# Patient Record
Sex: FEMALE | Race: WHITE | NOT HISPANIC OR LATINO | Employment: OTHER | RURAL
[De-identification: names, ages, dates, MRNs, and addresses within clinical notes are randomized per-mention and may not be internally consistent; named-entity substitution may affect disease eponyms.]

---

## 2020-07-15 ENCOUNTER — HISTORICAL (OUTPATIENT)
Dept: ADMINISTRATIVE | Facility: HOSPITAL | Age: 60
End: 2020-07-15

## 2020-12-14 ENCOUNTER — HISTORICAL (OUTPATIENT)
Dept: ADMINISTRATIVE | Facility: HOSPITAL | Age: 60
End: 2020-12-14

## 2021-03-16 ENCOUNTER — TELEPHONE (OUTPATIENT)
Dept: PRIMARY CARE CLINIC | Facility: CLINIC | Age: 61
End: 2021-03-16

## 2021-03-16 RX ORDER — HYDROCHLOROTHIAZIDE 12.5 MG/1
25 TABLET ORAL DAILY
COMMUNITY
Start: 2020-12-12 | End: 2021-06-24 | Stop reason: SDUPTHER

## 2021-03-16 RX ORDER — FENOFIBRATE 145 MG/1
145 TABLET, FILM COATED ORAL DAILY
COMMUNITY
Start: 2020-12-12 | End: 2021-06-24 | Stop reason: SDUPTHER

## 2021-03-16 RX ORDER — TOBRAMYCIN AND DEXAMETHASONE 3; 1 MG/ML; MG/ML
SUSPENSION/ DROPS OPHTHALMIC
COMMUNITY
Start: 2021-01-04 | End: 2022-02-22

## 2021-03-16 RX ORDER — OLMESARTAN MEDOXOMIL 40 MG/1
40 TABLET ORAL DAILY
COMMUNITY
Start: 2020-12-12 | End: 2021-03-16 | Stop reason: SDUPTHER

## 2021-03-16 RX ORDER — CARVEDILOL 25 MG/1
25 TABLET ORAL 2 TIMES DAILY
COMMUNITY
Start: 2021-02-08 | End: 2022-02-22 | Stop reason: SDUPTHER

## 2021-03-16 RX ORDER — DILTIAZEM HYDROCHLORIDE 240 MG/1
240 CAPSULE, COATED, EXTENDED RELEASE ORAL DAILY
COMMUNITY
Start: 2021-02-08 | End: 2021-03-16 | Stop reason: SDUPTHER

## 2021-03-16 RX ORDER — WARFARIN SODIUM 5 MG/1
5 TABLET ORAL DAILY
COMMUNITY
End: 2021-03-16 | Stop reason: SDUPTHER

## 2021-03-16 RX ORDER — OLMESARTAN MEDOXOMIL 40 MG/1
40 TABLET ORAL DAILY
Qty: 90 TABLET | Refills: 3 | Status: SHIPPED | OUTPATIENT
Start: 2021-03-16 | End: 2022-02-22 | Stop reason: SDUPTHER

## 2021-03-16 RX ORDER — LATANOPROST 50 UG/ML
1 SOLUTION/ DROPS OPHTHALMIC NIGHTLY
COMMUNITY
Start: 2021-02-01 | End: 2022-02-22

## 2021-03-16 RX ORDER — WARFARIN SODIUM 5 MG/1
5 TABLET ORAL DAILY
Qty: 90 TABLET | Refills: 3 | Status: SHIPPED | OUTPATIENT
Start: 2021-03-16 | End: 2022-04-26

## 2021-03-16 RX ORDER — DILTIAZEM HYDROCHLORIDE 240 MG/1
240 CAPSULE, COATED, EXTENDED RELEASE ORAL DAILY
Qty: 90 CAPSULE | Refills: 3 | Status: SHIPPED | OUTPATIENT
Start: 2021-03-16 | End: 2022-02-22 | Stop reason: SDUPTHER

## 2021-06-24 ENCOUNTER — TELEPHONE (OUTPATIENT)
Dept: PRIMARY CARE CLINIC | Facility: CLINIC | Age: 61
End: 2021-06-24

## 2021-06-24 RX ORDER — HYDROCHLOROTHIAZIDE 12.5 MG/1
25 TABLET ORAL DAILY
Qty: 90 TABLET | Refills: 1 | Status: SHIPPED | OUTPATIENT
Start: 2021-06-24

## 2021-06-24 RX ORDER — FENOFIBRATE 145 MG/1
145 TABLET, FILM COATED ORAL DAILY
Qty: 90 TABLET | Refills: 1 | Status: SHIPPED | OUTPATIENT
Start: 2021-06-24

## 2021-06-24 RX ORDER — ROSUVASTATIN CALCIUM 20 MG/1
20 TABLET, COATED ORAL DAILY
Qty: 90 TABLET | Refills: 1 | Status: SHIPPED | OUTPATIENT
Start: 2021-06-24 | End: 2022-02-22 | Stop reason: SDUPTHER

## 2021-06-24 RX ORDER — ROSUVASTATIN CALCIUM 20 MG/1
20 TABLET, COATED ORAL DAILY
COMMUNITY
Start: 2021-04-05 | End: 2021-06-24 | Stop reason: SDUPTHER

## 2021-08-09 ENCOUNTER — HOSPITAL ENCOUNTER (OUTPATIENT)
Dept: RADIOLOGY | Facility: HOSPITAL | Age: 61
Discharge: HOME | End: 2021-08-09
Payer: COMMERCIAL

## 2021-08-09 VITALS — BODY MASS INDEX: 37.89 KG/M2 | HEIGHT: 68 IN | WEIGHT: 250 LBS

## 2021-08-09 DIAGNOSIS — Z12.31 VISIT FOR SCREENING MAMMOGRAM: ICD-10-CM

## 2021-08-09 PROCEDURE — 76641 PR US BREAST COMPLETE UNILAT: ICD-10-PCS | Mod: 26,RT,, | Performed by: RADIOLOGY

## 2021-08-09 PROCEDURE — 77067 SCR MAMMO BI INCL CAD: CPT | Mod: TC

## 2021-08-09 PROCEDURE — 76641 ULTRASOUND BREAST COMPLETE: CPT | Mod: TC,50

## 2021-08-09 PROCEDURE — 77067 MAMMO DIGITAL SCREENING BILAT: ICD-10-PCS | Mod: 26,,, | Performed by: RADIOLOGY

## 2021-08-09 PROCEDURE — 76641 ULTRASOUND BREAST COMPLETE: CPT | Mod: 26,LT,, | Performed by: RADIOLOGY

## 2021-08-09 PROCEDURE — 77067 SCR MAMMO BI INCL CAD: CPT | Mod: 26,,, | Performed by: RADIOLOGY

## 2021-08-09 PROCEDURE — 76641 ULTRASOUND BREAST COMPLETE: CPT | Mod: 26,RT,, | Performed by: RADIOLOGY

## 2021-08-30 ENCOUNTER — TELEPHONE (OUTPATIENT)
Dept: PRIMARY CARE CLINIC | Facility: CLINIC | Age: 61
End: 2021-08-30

## 2021-09-09 ENCOUNTER — TELEPHONE (OUTPATIENT)
Dept: INTERNAL MEDICINE | Facility: CLINIC | Age: 61
End: 2021-09-09

## 2022-01-11 ENCOUNTER — HOSPITAL ENCOUNTER (OUTPATIENT)
Dept: RADIOLOGY | Facility: HOSPITAL | Age: 62
Discharge: HOME | End: 2022-01-11
Attending: RADIOLOGY
Payer: COMMERCIAL

## 2022-01-11 VITALS — BODY MASS INDEX: 39.4 KG/M2 | WEIGHT: 260 LBS | HEIGHT: 68 IN

## 2022-01-11 DIAGNOSIS — Z09 FOLLOW-UP EXAM, 3-6 MONTHS SINCE PREVIOUS EXAM: ICD-10-CM

## 2022-01-11 PROCEDURE — 77066 DX MAMMO INCL CAD BI: CPT | Mod: TC

## 2022-01-11 PROCEDURE — 77066 MAMMO DIGITAL DIAGNOSTIC BILAT: ICD-10-PCS | Mod: 26,,, | Performed by: RADIOLOGY

## 2022-01-11 PROCEDURE — 77066 DX MAMMO INCL CAD BI: CPT | Mod: 26,,, | Performed by: RADIOLOGY

## 2022-02-22 ENCOUNTER — OFFICE VISIT (OUTPATIENT)
Dept: PRIMARY CARE CLINIC | Facility: CLINIC | Age: 62
End: 2022-02-22
Payer: COMMERCIAL

## 2022-02-22 VITALS
DIASTOLIC BLOOD PRESSURE: 84 MMHG | BODY MASS INDEX: 40.92 KG/M2 | OXYGEN SATURATION: 97 % | RESPIRATION RATE: 17 BRPM | WEIGHT: 270 LBS | TEMPERATURE: 98 F | HEART RATE: 81 BPM | SYSTOLIC BLOOD PRESSURE: 124 MMHG | HEIGHT: 68 IN

## 2022-02-22 DIAGNOSIS — I48.0 PAROXYSMAL ATRIAL FIBRILLATION: ICD-10-CM

## 2022-02-22 DIAGNOSIS — I10 PRIMARY HYPERTENSION: Primary | ICD-10-CM

## 2022-02-22 DIAGNOSIS — E78.5 HYPERLIPIDEMIA, UNSPECIFIED HYPERLIPIDEMIA TYPE: ICD-10-CM

## 2022-02-22 PROCEDURE — 99213 PR OFFICE/OUTPT VISIT, EST, LEVL III, 20-29 MIN: ICD-10-PCS | Mod: ,,, | Performed by: NURSE PRACTITIONER

## 2022-02-22 PROCEDURE — 99213 OFFICE O/P EST LOW 20 MIN: CPT | Mod: ,,, | Performed by: NURSE PRACTITIONER

## 2022-02-22 PROCEDURE — 3074F PR MOST RECENT SYSTOLIC BLOOD PRESSURE < 130 MM HG: ICD-10-PCS | Mod: ,,, | Performed by: NURSE PRACTITIONER

## 2022-02-22 PROCEDURE — 3008F BODY MASS INDEX DOCD: CPT | Mod: ,,, | Performed by: NURSE PRACTITIONER

## 2022-02-22 PROCEDURE — 3079F DIAST BP 80-89 MM HG: CPT | Mod: ,,, | Performed by: NURSE PRACTITIONER

## 2022-02-22 PROCEDURE — 3074F SYST BP LT 130 MM HG: CPT | Mod: ,,, | Performed by: NURSE PRACTITIONER

## 2022-02-22 PROCEDURE — 4010F ACE/ARB THERAPY RXD/TAKEN: CPT | Mod: ,,, | Performed by: NURSE PRACTITIONER

## 2022-02-22 PROCEDURE — 3079F PR MOST RECENT DIASTOLIC BLOOD PRESSURE 80-89 MM HG: ICD-10-PCS | Mod: ,,, | Performed by: NURSE PRACTITIONER

## 2022-02-22 PROCEDURE — 1159F MED LIST DOCD IN RCRD: CPT | Mod: ,,, | Performed by: NURSE PRACTITIONER

## 2022-02-22 PROCEDURE — 3008F PR BODY MASS INDEX (BMI) DOCUMENTED: ICD-10-PCS | Mod: ,,, | Performed by: NURSE PRACTITIONER

## 2022-02-22 PROCEDURE — 4010F PR ACE/ARB THEARPY RXD/TAKEN: ICD-10-PCS | Mod: ,,, | Performed by: NURSE PRACTITIONER

## 2022-02-22 PROCEDURE — 1159F PR MEDICATION LIST DOCUMENTED IN MEDICAL RECORD: ICD-10-PCS | Mod: ,,, | Performed by: NURSE PRACTITIONER

## 2022-02-22 RX ORDER — OLMESARTAN MEDOXOMIL 40 MG/1
40 TABLET ORAL DAILY
Qty: 90 TABLET | Refills: 3 | Status: SHIPPED | OUTPATIENT
Start: 2022-02-22

## 2022-02-22 RX ORDER — LORATADINE 10 MG/1
10 TABLET ORAL DAILY
COMMUNITY

## 2022-02-22 RX ORDER — ROSUVASTATIN CALCIUM 20 MG/1
20 TABLET, COATED ORAL DAILY
Qty: 90 TABLET | Refills: 3 | Status: SHIPPED | OUTPATIENT
Start: 2022-02-22

## 2022-02-22 RX ORDER — DILTIAZEM HYDROCHLORIDE 240 MG/1
240 CAPSULE, COATED, EXTENDED RELEASE ORAL DAILY
Qty: 90 CAPSULE | Refills: 3 | Status: SHIPPED | OUTPATIENT
Start: 2022-02-22

## 2022-02-22 RX ORDER — CARVEDILOL 25 MG/1
25 TABLET ORAL 2 TIMES DAILY
Qty: 90 TABLET | Refills: 3 | Status: SHIPPED | OUTPATIENT
Start: 2022-02-22 | End: 2022-08-16 | Stop reason: SDUPTHER

## 2022-02-22 RX ORDER — MAGNESIUM HYDROXIDE 400 MG/5ML
1 SUSPENSION, ORAL (FINAL DOSE FORM) ORAL DAILY
COMMUNITY

## 2022-02-22 NOTE — PROGRESS NOTES
Subjective:       Patient ID: Heidy Moyer is a 61 y.o. female.    Chief Complaint: Follow-up and Medication Refill (Cardizem, benicar, crestor, and coreg)    HPI Heidy Moyer presents today for routine follow up and medication refills.  Patient denies complaints or problems at present  Labs done with Dr. Bautista in September 2021, scanned into Media    Review of Systems   Constitutional: Negative for fatigue, fever and unexpected weight change.   HENT: Negative for nasal congestion, postnasal drip and sore throat.    Eyes: Negative for pain and redness.   Respiratory: Negative for cough, shortness of breath and wheezing.    Cardiovascular: Negative for chest pain and leg swelling.   Gastrointestinal: Negative for abdominal pain, constipation, diarrhea and nausea.   Genitourinary: Negative for dysuria and hematuria.   Musculoskeletal: Negative for gait problem.   Integumentary:  Negative for color change and rash.   Neurological: Negative for vertigo, syncope and headaches.          Objective:      Physical Exam  Constitutional:       Appearance: Normal appearance.   HENT:      Head: Normocephalic.   Eyes:      Pupils: Pupils are equal, round, and reactive to light.   Cardiovascular:      Rate and Rhythm: Normal rate and regular rhythm.      Pulses: Normal pulses.      Heart sounds: Normal heart sounds.   Pulmonary:      Effort: Pulmonary effort is normal. No respiratory distress.      Breath sounds: Normal breath sounds. No wheezing, rhonchi or rales.   Abdominal:      General: Bowel sounds are normal.      Palpations: Abdomen is soft.      Tenderness: There is no abdominal tenderness.   Musculoskeletal:         General: Normal range of motion.      Cervical back: Normal range of motion and neck supple. No tenderness.   Skin:     General: Skin is warm and dry.   Neurological:      General: No focal deficit present.      Mental Status: She is alert.      Cranial Nerves: No cranial nerve  deficit.      Gait: Gait normal.         Assessment:       Problem List Items Addressed This Visit        Cardiac/Vascular    Hypertension - Primary    Relevant Medications    olmesartan (BENICAR) 40 MG tablet    diltiaZEM (CARDIZEM CD) 240 MG 24 hr capsule    carvediloL (COREG) 25 MG tablet    Hyperlipidemia    Relevant Medications    rosuvastatin (CRESTOR) 20 MG tablet    Atrial fibrillation          Plan:       Refills  RTC 6 months, sooner as needed

## 2022-08-16 ENCOUNTER — TELEPHONE (OUTPATIENT)
Dept: PRIMARY CARE CLINIC | Facility: CLINIC | Age: 62
End: 2022-08-16
Payer: COMMERCIAL

## 2022-08-16 DIAGNOSIS — I10 PRIMARY HYPERTENSION: ICD-10-CM

## 2022-08-16 RX ORDER — CARVEDILOL 25 MG/1
25 TABLET ORAL 2 TIMES DAILY
Qty: 180 TABLET | Refills: 3 | Status: SHIPPED | OUTPATIENT
Start: 2022-08-16

## 2022-08-16 NOTE — TELEPHONE ENCOUNTER
----- Message from Frida Staples sent at 8/15/2022 10:01 AM CDT -----  Regarding: MEDICATION REFILL  Caller patient [phone]  Refill Carvedilol 25mg   [facility]    Todd

## 2022-08-25 ENCOUNTER — HOSPITAL ENCOUNTER (OUTPATIENT)
Dept: RADIOLOGY | Facility: HOSPITAL | Age: 62
Discharge: HOME | End: 2022-08-25
Payer: COMMERCIAL

## 2022-08-25 ENCOUNTER — HOSPITAL ENCOUNTER (OUTPATIENT)
Dept: RADIOLOGY | Facility: HOSPITAL | Age: 62
Discharge: HOME | End: 2022-08-25
Attending: RADIOLOGY
Payer: COMMERCIAL

## 2022-08-25 DIAGNOSIS — R92.8 ABNORMAL MAMMOGRAM: ICD-10-CM

## 2022-08-25 DIAGNOSIS — Z12.31 VISIT FOR SCREENING MAMMOGRAM: ICD-10-CM

## 2022-08-25 PROCEDURE — 76641 ULTRASOUND BREAST COMPLETE: CPT | Mod: 26,RT,, | Performed by: RADIOLOGY

## 2022-08-25 PROCEDURE — 77065 DX MAMMO INCL CAD UNI: CPT | Mod: 26,RT,, | Performed by: RADIOLOGY

## 2022-08-25 PROCEDURE — 77067 MAMMO DIGITAL SCREENING BILAT: ICD-10-PCS | Mod: 26,,, | Performed by: RADIOLOGY

## 2022-08-25 PROCEDURE — 77065 MAMMO DIGITAL DIAGNOSTIC RIGHT: ICD-10-PCS | Mod: 26,RT,, | Performed by: RADIOLOGY

## 2022-08-25 PROCEDURE — 77067 SCR MAMMO BI INCL CAD: CPT | Mod: TC

## 2022-08-25 PROCEDURE — 76641 ULTRASOUND BREAST COMPLETE: CPT | Mod: TC,50

## 2022-08-25 PROCEDURE — 77067 SCR MAMMO BI INCL CAD: CPT | Mod: 26,,, | Performed by: RADIOLOGY

## 2022-08-25 PROCEDURE — 77065 DX MAMMO INCL CAD UNI: CPT | Mod: TC,RT

## 2022-08-25 PROCEDURE — 76641 ULTRASOUND BREAST COMPLETE: CPT | Mod: 26,LT,, | Performed by: RADIOLOGY

## 2022-08-25 PROCEDURE — 76641 PR US BREAST COMPLETE UNILAT: ICD-10-PCS | Mod: 26,RT,, | Performed by: RADIOLOGY

## 2022-08-29 ENCOUNTER — OFFICE VISIT (OUTPATIENT)
Dept: SURGERY | Facility: CLINIC | Age: 62
End: 2022-08-29
Attending: SURGERY
Payer: COMMERCIAL

## 2022-08-29 DIAGNOSIS — Z01.818 ENCOUNTER FOR OTHER PREPROCEDURAL EXAMINATION: ICD-10-CM

## 2022-08-29 DIAGNOSIS — R92.8 ABNORMAL MAMMOGRAM OF RIGHT BREAST: Primary | ICD-10-CM

## 2022-08-29 PROCEDURE — 1159F PR MEDICATION LIST DOCUMENTED IN MEDICAL RECORD: ICD-10-PCS | Mod: ,,, | Performed by: SURGERY

## 2022-08-29 PROCEDURE — 4010F ACE/ARB THERAPY RXD/TAKEN: CPT | Mod: ,,, | Performed by: SURGERY

## 2022-08-29 PROCEDURE — 99204 PR OFFICE/OUTPT VISIT, NEW, LEVL IV, 45-59 MIN: ICD-10-PCS | Mod: S$PBB,,, | Performed by: SURGERY

## 2022-08-29 PROCEDURE — 4010F PR ACE/ARB THEARPY RXD/TAKEN: ICD-10-PCS | Mod: ,,, | Performed by: SURGERY

## 2022-08-29 PROCEDURE — 99214 OFFICE O/P EST MOD 30 MIN: CPT | Mod: PBBFAC | Performed by: SURGERY

## 2022-08-29 PROCEDURE — 99204 OFFICE O/P NEW MOD 45 MIN: CPT | Mod: S$PBB,,, | Performed by: SURGERY

## 2022-08-29 PROCEDURE — 1159F MED LIST DOCD IN RCRD: CPT | Mod: ,,, | Performed by: SURGERY

## 2022-08-29 NOTE — PATIENT INSTRUCTIONS
[facility]  [address] [place] [address]      YOUR SURGERY DATE IS 09/08/2022. You will go to Dr. López's office at 0720am that morning for needle localization.      LABWORK IS SCHEDULED FOR TODAY. PLEASE SIGN IN ON 1ST FLOOR OF THE [facility] FOR LAB.       DO NOT EAT OR DRINK ANYTHING AFTER MIDNIGHT BEFORE YOU SURGERY    BRING ALL MEDICATIONS YOU ARE CURRENTLY TAKING WITH YOU.  IF YOU ARE TAKING  A BLOOD PRESSURE MEDICATION, TAKE YOUR BLOOD PRESSURE MEDICATION WITH A   SIP OF WATER WHEN YOU GET UP THE MORNING OF SURGERY.     YOU MUST PRE-ADMIT AT THE FOLLOWING WEBSITE:  WEBSITE: [email or web address]  PASSWORD: [email or web address]    A STAFF MEMBER FROM THE [facility] WILL CALL YOU THE DAY BEFORE  SURGERY TO LET YOU KNOW WHAT TIME TO ARRIVE THE MORNING OF SURGERY.  IF YOU HAVE NOT HEARD FROM THEM BY 4 P.M. THE DAY BEFORE YOUR SURGERY,   PLEASE CALL THEM -[phone].      IF YOU HAVE ANY QUESTIONS YOU MAY CONTACT OUR OFFICE -[phone].

## 2022-08-30 NOTE — PROGRESS NOTES
General Surgery History and Physical      Patient ID: Heidy Moyer is a 61 y.o. female.    Chief Complaint: Breast Problem      HPI:  61-year-old female who presents with a recent mammogram done that showed new right-sided lower outer quadrant microcalcifications.  Increasing calcifications.  Patient denies any symptoms no breast pain, nipple discharge, skin changes.  She has never had any breast problems in the past personally but she does have 3 sisters with breast cancer.  All of her sisters have tested negative for genetic testing.  Patient is a  female.  She has AFib and is on Coumadin for it.    Review of Systems   Constitutional:  Negative for activity change, appetite change, fatigue and fever.   HENT:  Negative for trouble swallowing.    Respiratory:  Negative for cough and shortness of breath.    Cardiovascular:  Negative for chest pain and palpitations.   Gastrointestinal:  Negative for abdominal distention, abdominal pain, blood in stool, constipation and diarrhea.   Genitourinary:  Negative for flank pain.   Musculoskeletal:  Negative for neck pain and neck stiffness.   Neurological:  Negative for weakness.     Current Outpatient Medications   Medication Sig Dispense Refill    calcium carbonate/vitamin D3 (CALTRATE 600 + D ORAL) Take 1 tablet by mouth 2 (two) times a day.      carvediloL (COREG) 25 MG tablet Take 1 tablet (25 mg total) by mouth 2 (two) times daily. 180 tablet 3    cyanocobalamin, vitamin B-12, 5,000 mcg TbDL Take 1 tablet by mouth Daily.      diltiaZEM (CARDIZEM CD) 240 MG 24 hr capsule Take 1 capsule (240 mg total) by mouth once daily. 90 capsule 3    fenofibrate (TRICOR) 145 MG tablet Take 1 tablet (145 mg total) by mouth once daily. 90 tablet 1    hydroCHLOROthiazide (HYDRODIURIL) 12.5 MG Tab Take 2 tablets (25 mg total) by mouth once daily. 90 tablet 1    loratadine (CLARITIN)  10 mg tablet Take 10 mg by mouth once daily.      magnesium chloride (MAG 64 ORAL) Take 1 tablet by mouth 2 (two) times a day.      multivit/folic acid/vit K1 (ONE-A-DAY WOMEN'S 50 PLUS ORAL) Take 1 tablet by mouth Daily.      olmesartan (BENICAR) 40 MG tablet Take 1 tablet (40 mg total) by mouth once daily. 90 tablet 3    rosuvastatin (CRESTOR) 20 MG tablet Take 1 tablet (20 mg total) by mouth once daily. 90 tablet 3    warfarin (COUMADIN) 5 MG tablet Take 1 tablet (5 mg total) by mouth Daily. Take 1 tab daily and 0.5 tab every Wed and Sun (Patient taking differently: Take 5 mg by mouth Daily. Take 1 tab daily and 0.5 tab every Mon and Wed) 90 tablet 3     No current facility-administered medications for this visit.       Review of patient's allergies indicates:   Allergen Reactions    Lovastatin Other (See Comments)    Pravastatin Other (See Comments)       Past Medical History:   Diagnosis Date    Atrial fibrillation     Hyperlipidemia     Hypertension        Past Surgical History:   Procedure Laterality Date     SECTION      x2    CHOLECYSTECTOMY      EYE SURGERY Left     clogged eye duct surgery (bypass)    HYSTERECTOMY      TUBAL LIGATION         Family History   Problem Relation Age of Onset    Breast cancer Sister     Heart disease Mother     Heart attack Father        Social History     Socioeconomic History    Marital status:    Tobacco Use    Smoking status: Never    Smokeless tobacco: Never   Substance and Sexual Activity    Alcohol use: Never    Drug use: Never       There were no vitals filed for this visit.    Physical Exam  Constitutional:       General: She is not in acute distress.  HENT:      Head: Normocephalic.   Cardiovascular:      Rate and Rhythm: Normal rate and regular rhythm.      Pulses: Normal pulses.   Pulmonary:      Effort: Pulmonary effort is normal. No respiratory distress.      Breath sounds: Normal breath sounds.   Abdominal:      General: Abdomen is flat. There is  no distension.      Palpations: Abdomen is soft.      Tenderness: There is no abdominal tenderness.   Musculoskeletal:         General: Normal range of motion.   Skin:     General: Skin is warm.   Neurological:      General: No focal deficit present.      Mental Status: She is oriented to person, place, and time.     Result:  US Breast Bilateral Complete  Mammo Digital Screening Bilat  Mammo Digital Diagnostic Right     History:  Patient is 61 y.o. and is seen for a screening mammogram.  Positive family history of breast cancer - 3 sisters, diagnosed in her 50's  History of right breast calcifications      Films Compared:03/01/2013 through 01/11/2022      Findings:  The breasts are heterogeneously dense, which may obscure small masses. Benign secretory calcifications are again seen in the left breast.      In the right breast, there are calcifications in the lower outer quadrant approximately 13 cm radially from the nipple. These have gradually increased in number over the years. Calcifications extend for an area of approximately 7 cm in AP dimension and less than 1 cm in sagittal dimension. For the most, part they are coarse with a benign appearance, most likely representing secretory calcifications. However, additional microcalcifications have developed posterior to the area of stable calcifications. No associated soft tissue mass is seen. No abnormalities are seen elsewhere in the right breast.  Ultrasound was performed for this reason. All four breast quadrants and the retroareolar regions were scanned. The background tissue is homogeneous - fibroglandular.      I did not palpate a discrete mass in either breast. Ultrasound of the right lower outer quadrant was performed with attention to the area 13 cm radially from the nipple. A few of the coarse calcifications can be visualized sonographically but no associated soft tissue mass is seen. No other abnormalities are seen elsewhere in the right breast or axillary  region. No abnormalities are seen in the left breast or axilla.      The patient is on Coumadin for atrial fibrillation.      Impression:   No interval changes have occurred in the left breast. Increasing calcifications in the right lower outer quadrant may represent DCIS and biopsy is recommended. The patient will follow-up with Dr. Tony Kline for further treatment planning on Monday, August 29, 2022 at 13:30 for further treatment planning.      BI-RADS CATEGORY 4A - Suspicious abnormality - biopsy should be considered. Low suspicion for malignancy.      Recommendation:  Biopsy is recommended.     Your estimated lifetime risk of breast cancer (to age 85) based on Tyrer-Cuzick risk assessment model is 13.25 %.  According to the American Cancer Society, patients with a lifetime breast cancer risk of 20% or higher might benefit from supplemental screening tests. ??      Cc: Dr. Spence  Cc: Dr. Kline  Assessment & Plan:    Abnormal mammogram of right breast  -     Cancel: US Needle Loc with Ultrasound 1st site; Future; Expected date: 09/08/2022  -     Mammo Breast Specimen; Future; Expected date: 08/08/2023  -     Mammo Needle Loc with Mammo Guidance 1st; Future; Expected date: 09/08/2022    Encounter for other preprocedural examination  -     Basic Metabolic Panel; Future; Expected date: 08/29/2022  -     CBC Auto Differential; Future; Expected date: 08/29/2022        Patient to go to the OR 09/08/2022 for a right breast needle localized excisional biopsy of this microcalcifications.  Risks and benefits explained to the patient including risk of bleeding, infection, missing the lesion, possible need for additional operations.  All questions were answered.

## 2022-09-08 ENCOUNTER — HOSPITAL ENCOUNTER (OUTPATIENT)
Dept: RADIOLOGY | Facility: HOSPITAL | Age: 62
Discharge: HOME | End: 2022-09-08
Attending: SURGERY
Payer: COMMERCIAL

## 2022-09-08 DIAGNOSIS — R92.8 ABNORMAL MAMMOGRAM OF RIGHT BREAST: ICD-10-CM

## 2022-09-08 PROCEDURE — 19281 PERQ DEVICE BREAST 1ST IMAG: CPT

## 2022-09-08 PROCEDURE — 19281 PERQ DEVICE BREAST 1ST IMAG: CPT | Mod: RT,,, | Performed by: RADIOLOGY

## 2022-09-08 PROCEDURE — 76098 MAMMO BREAST SPECIMEN: ICD-10-PCS | Mod: 26,,, | Performed by: RADIOLOGY

## 2022-09-08 PROCEDURE — 19281 MAMMO NEEDLE LOC WITH MAMMO GUIDANCE 1ST SITE: ICD-10-PCS | Mod: RT,,, | Performed by: RADIOLOGY

## 2022-09-08 PROCEDURE — 76098 X-RAY EXAM SURGICAL SPECIMEN: CPT | Mod: TC

## 2022-09-08 PROCEDURE — 76098 X-RAY EXAM SURGICAL SPECIMEN: CPT | Mod: 26,,, | Performed by: RADIOLOGY

## 2022-09-13 ENCOUNTER — OFFICE VISIT (OUTPATIENT)
Dept: SURGERY | Facility: CLINIC | Age: 62
End: 2022-09-13
Attending: SURGERY
Payer: COMMERCIAL

## 2022-09-13 DIAGNOSIS — Z09 FOLLOW-UP EXAMINATION, FOLLOWING OTHER SURGERY: Primary | ICD-10-CM

## 2022-09-13 PROCEDURE — 1160F PR REVIEW ALL MEDS BY PRESCRIBER/CLIN PHARMACIST DOCUMENTED: ICD-10-PCS | Mod: ,,, | Performed by: SURGERY

## 2022-09-13 PROCEDURE — 99024 POSTOP FOLLOW-UP VISIT: CPT | Mod: ,,, | Performed by: SURGERY

## 2022-09-13 PROCEDURE — 4010F ACE/ARB THERAPY RXD/TAKEN: CPT | Mod: ,,, | Performed by: SURGERY

## 2022-09-13 PROCEDURE — 4010F PR ACE/ARB THEARPY RXD/TAKEN: ICD-10-PCS | Mod: ,,, | Performed by: SURGERY

## 2022-09-13 PROCEDURE — 99213 OFFICE O/P EST LOW 20 MIN: CPT | Mod: PBBFAC | Performed by: SURGERY

## 2022-09-13 PROCEDURE — 99024 PR POST-OP FOLLOW-UP VISIT: ICD-10-PCS | Mod: ,,, | Performed by: SURGERY

## 2022-09-13 PROCEDURE — 1159F MED LIST DOCD IN RCRD: CPT | Mod: ,,, | Performed by: SURGERY

## 2022-09-13 PROCEDURE — 1159F PR MEDICATION LIST DOCUMENTED IN MEDICAL RECORD: ICD-10-PCS | Mod: ,,, | Performed by: SURGERY

## 2022-09-13 PROCEDURE — 1160F RVW MEDS BY RX/DR IN RCRD: CPT | Mod: ,,, | Performed by: SURGERY

## 2022-09-13 NOTE — PROGRESS NOTES
Post-operative Note    HPI:  The patient is status post excision of right breast mass.  Doing well overall no pain just anxious about the results.  A verbal discussion with the pathologist came back as benign micro proliferation.  This reassured the patient she felt better.    PHYSICAL EXAM:    Incisions well healed clean dry intact    Recent Results (from the past 504 hour(s))   Basic Metabolic Panel    Collection Time: 08/29/22  3:49 PM   Result Value Ref Range    Sodium 140 136 - 145 mmol/L    Potassium 4.3 3.5 - 5.1 mmol/L    Chloride 103 98 - 107 mmol/L    CO2 27 21 - 32 mmol/L    Anion Gap 14 7 - 16 mmol/L    Glucose 105 74 - 106 mg/dL    BUN 25 (H) 7 - 18 mg/dL    Creatinine 1.15 (H) 0.55 - 1.02 mg/dL    BUN/Creatinine Ratio 22 (H) 6 - 20    Calcium 10.0 8.5 - 10.1 mg/dL    eGFR 54 (L) >=60 mL/min/1.73m²   CBC with Differential    Collection Time: 08/29/22  3:49 PM   Result Value Ref Range    WBC 6.55 4.50 - 11.00 K/uL    RBC 4.76 4.20 - 5.40 M/uL    Hemoglobin 14.6 12.0 - 16.0 g/dL    Hematocrit 44.0 38.0 - 47.0 %    MCV 92.4 80.0 - 96.0 fL    MCH 30.7 27.0 - 31.0 pg    MCHC 33.2 32.0 - 36.0 g/dL    RDW 13.6 11.5 - 14.5 %    Platelet Count 302 150 - 400 K/uL    MPV 11.7 9.4 - 12.4 fL    Neutrophils % 45.4 (L) 53.0 - 65.0 %    Lymphocytes % 41.1 (H) 27.0 - 41.0 %    Monocytes % 9.8 (H) 2.0 - 6.0 %    Eosinophils % 2.9 1.0 - 4.0 %    Basophils % 0.5 0.0 - 1.0 %    Immature Granulocytes % 0.3 0.0 - 0.4 %    nRBC, Auto 0.0 <=0.0 %    Neutrophils, Abs 2.98 1.80 - 7.70 K/uL    Lymphocytes, Absolute 2.69 1.00 - 4.80 K/uL    Monocytes, Absolute 0.64 0.00 - 0.80 K/uL    Eosinophils, Absolute 0.19 0.00 - 0.50 K/uL    Basophils, Absolute 0.03 0.00 - 0.20 K/uL    Immature Granulocytes, Absolute 0.02 0.00 - 0.04 K/uL    nRBC, Absolute 0.00 <=0.00 x10e3/uL    Diff Type Auto         ASSESSMENT:      The patient is doing well after  surgery.       PLAN:      Follow up PRN.    Routine mammogram in 6 months time with her mammographer.

## 2022-10-17 ENCOUNTER — HOSPITAL ENCOUNTER (OUTPATIENT)
Dept: RADIOLOGY | Facility: HOSPITAL | Age: 62
Discharge: HOME | End: 2022-10-17
Attending: ORTHOPAEDIC SURGERY
Payer: COMMERCIAL

## 2022-10-17 DIAGNOSIS — M25.561 RIGHT KNEE PAIN, UNSPECIFIED CHRONICITY: ICD-10-CM

## 2022-10-17 PROBLEM — M17.11 ARTHRITIS OF RIGHT KNEE: Status: ACTIVE | Noted: 2022-10-17

## 2022-10-17 PROCEDURE — 73564 X-RAY EXAM KNEE 4 OR MORE: CPT | Mod: TC,RT

## 2023-01-18 ENCOUNTER — OFFICE VISIT (OUTPATIENT)
Dept: ORTHOPEDICS | Facility: CLINIC | Age: 63
End: 2023-01-18
Payer: COMMERCIAL

## 2023-01-18 DIAGNOSIS — M25.561 RIGHT KNEE PAIN, UNSPECIFIED CHRONICITY: Primary | ICD-10-CM

## 2023-01-18 PROCEDURE — 99212 OFFICE O/P EST SF 10 MIN: CPT | Mod: PBBFAC | Performed by: ORTHOPAEDIC SURGERY

## 2023-01-18 PROCEDURE — 99212 PR OFFICE/OUTPT VISIT, EST, LEVL II, 10-19 MIN: ICD-10-PCS | Mod: S$PBB,,, | Performed by: ORTHOPAEDIC SURGERY

## 2023-01-18 PROCEDURE — 99212 OFFICE O/P EST SF 10 MIN: CPT | Mod: S$PBB,,, | Performed by: ORTHOPAEDIC SURGERY

## 2023-01-18 NOTE — PROGRESS NOTES
Patient is here for right knee pain the previous injection did help with her pain she is able to ambulate without as much difficulty.  At this time she wishes to try injections when her pain returns.  She wants to postpone her surgery until the fall.  I told her when her pain returns I will inject her knee will plan on doing a total knee arthroplasty in the fall she is bone-on-bone in the medial compartment of right knee crepitus varus alignment.  She will call to schedule an injection when she starts having increasing pain again.

## 2023-01-19 ENCOUNTER — TELEPHONE (OUTPATIENT)
Dept: ORTHOPEDICS | Facility: HOSPITAL | Age: 63
End: 2023-01-19

## 2023-02-22 ENCOUNTER — HOSPITAL ENCOUNTER (OUTPATIENT)
Dept: RADIOLOGY | Facility: HOSPITAL | Age: 63
Discharge: HOME | End: 2023-02-22
Attending: RADIOLOGY
Payer: COMMERCIAL

## 2023-02-22 DIAGNOSIS — R92.8 ABNORMAL MAMMOGRAM: ICD-10-CM

## 2023-02-22 PROCEDURE — 77066 MAMMO DIGITAL DIAGNOSTIC BILAT: ICD-10-PCS | Mod: 26,,, | Performed by: RADIOLOGY

## 2023-02-22 PROCEDURE — 77066 DX MAMMO INCL CAD BI: CPT | Mod: 26,,, | Performed by: RADIOLOGY

## 2023-02-22 PROCEDURE — 77066 DX MAMMO INCL CAD BI: CPT | Mod: TC

## 2023-06-28 ENCOUNTER — OFFICE VISIT (OUTPATIENT)
Dept: ORTHOPEDICS | Facility: CLINIC | Age: 63
End: 2023-06-28
Payer: COMMERCIAL

## 2023-06-28 VITALS — HEIGHT: 68 IN | WEIGHT: 270 LBS | BODY MASS INDEX: 40.92 KG/M2

## 2023-06-28 DIAGNOSIS — Z01.811 PRE-OPERATIVE RESPIRATORY EXAMINATION: ICD-10-CM

## 2023-06-28 DIAGNOSIS — M25.561 RIGHT KNEE PAIN, UNSPECIFIED CHRONICITY: Primary | ICD-10-CM

## 2023-06-28 DIAGNOSIS — Z01.812 PRE-OPERATIVE LABORATORY EXAMINATION: ICD-10-CM

## 2023-06-28 DIAGNOSIS — M17.11 ARTHRITIS OF RIGHT KNEE: ICD-10-CM

## 2023-06-28 PROCEDURE — 1159F MED LIST DOCD IN RCRD: CPT | Mod: ,,, | Performed by: ORTHOPAEDIC SURGERY

## 2023-06-28 PROCEDURE — 20610 DRAIN/INJ JOINT/BURSA W/O US: CPT | Mod: 50,PBBFAC | Performed by: ORTHOPAEDIC SURGERY

## 2023-06-28 PROCEDURE — 3008F BODY MASS INDEX DOCD: CPT | Mod: ,,, | Performed by: ORTHOPAEDIC SURGERY

## 2023-06-28 PROCEDURE — 3008F PR BODY MASS INDEX (BMI) DOCUMENTED: ICD-10-PCS | Mod: ,,, | Performed by: ORTHOPAEDIC SURGERY

## 2023-06-28 PROCEDURE — 99213 OFFICE O/P EST LOW 20 MIN: CPT | Mod: PBBFAC | Performed by: ORTHOPAEDIC SURGERY

## 2023-06-28 PROCEDURE — 99213 OFFICE O/P EST LOW 20 MIN: CPT | Mod: S$PBB,25,, | Performed by: ORTHOPAEDIC SURGERY

## 2023-06-28 PROCEDURE — 20610 LARGE JOINT ASPIRATION/INJECTION: BILATERAL KNEE: ICD-10-PCS | Mod: 50,S$PBB,, | Performed by: ORTHOPAEDIC SURGERY

## 2023-06-28 PROCEDURE — 20610 DRAIN/INJ JOINT/BURSA W/O US: CPT | Mod: PBBFAC | Performed by: ORTHOPAEDIC SURGERY

## 2023-06-28 PROCEDURE — 1159F PR MEDICATION LIST DOCUMENTED IN MEDICAL RECORD: ICD-10-PCS | Mod: ,,, | Performed by: ORTHOPAEDIC SURGERY

## 2023-06-28 PROCEDURE — 4010F ACE/ARB THERAPY RXD/TAKEN: CPT | Mod: ,,, | Performed by: ORTHOPAEDIC SURGERY

## 2023-06-28 PROCEDURE — 4010F PR ACE/ARB THEARPY RXD/TAKEN: ICD-10-PCS | Mod: ,,, | Performed by: ORTHOPAEDIC SURGERY

## 2023-06-28 PROCEDURE — 99213 PR OFFICE/OUTPT VISIT, EST, LEVL III, 20-29 MIN: ICD-10-PCS | Mod: S$PBB,25,, | Performed by: ORTHOPAEDIC SURGERY

## 2023-06-28 RX ORDER — TRIAMCINOLONE ACETONIDE 40 MG/ML
40 INJECTION, SUSPENSION INTRA-ARTICULAR; INTRAMUSCULAR
Status: DISCONTINUED | OUTPATIENT
Start: 2023-06-28 | End: 2023-06-28 | Stop reason: HOSPADM

## 2023-06-28 RX ORDER — BUPIVACAINE HYDROCHLORIDE 2.5 MG/ML
1 INJECTION, SOLUTION EPIDURAL; INFILTRATION; INTRACAUDAL
Status: DISCONTINUED | OUTPATIENT
Start: 2023-06-28 | End: 2023-06-28 | Stop reason: HOSPADM

## 2023-06-28 RX ADMIN — BUPIVACAINE HYDROCHLORIDE 1 ML: 2.5 INJECTION, SOLUTION EPIDURAL; INFILTRATION; INTRACAUDAL; PERINEURAL at 08:06

## 2023-06-28 RX ADMIN — TRIAMCINOLONE ACETONIDE 40 MG: 40 INJECTION, SUSPENSION INTRA-ARTICULAR; INTRAMUSCULAR at 08:06

## 2023-06-28 NOTE — PROGRESS NOTES
Patient is here for bilateral knee arthritic changes.  She is considering total knee arthroplasty.  At this time she still has a bone-on-bone medial compartment on the right knee.  She is having pain in both knees.  Right greater than left.  I injected each knee 1 cc Marcaine 1 cc Kenalog.  Let her weightbear as tolerates.  I will follow back up 3-4 months.Heidy Moyer presents today for knee pain from ploa.  bilateralKnee prepped sterile technique and injected with 1cc marcaine snd 1cc kenalog.  Tolerated procedure well. Verbal consent obtained

## 2023-06-28 NOTE — PROCEDURES
Large Joint Aspiration/Injection: bilateral knee    Date/Time: 6/28/2023 8:40 AM  Performed by: William Bravo MD  Authorized by: William Bravo MD     Consent Done?:  Yes (Verbal)  Indications:  Arthritis  Prep: patient was prepped and draped in usual sterile fashion      Details:  Needle Size:  22 G  Ultrasonic Guidance for needle placement?: No    Approach:  Anterolateral  Location:  Knee  Laterality:  Bilateral  Site:  Bilateral knee  Medications (Right):  1 mL BUPivacaine (PF) 0.25% (2.5 mg/ml) 0.25 % (2.5 mg/mL); 40 mg triamcinolone acetonide 40 mg/mL  Medications (Left):  1 mL BUPivacaine (PF) 0.25% (2.5 mg/ml) 0.25 % (2.5 mg/mL); 40 mg triamcinolone acetonide 40 mg/mL  Patient tolerance:  Patient tolerated the procedure well with no immediate complications

## 2023-07-19 ENCOUNTER — TELEPHONE (OUTPATIENT)
Dept: ORTHOPEDICS | Facility: CLINIC | Age: 63
End: 2023-07-19
Payer: COMMERCIAL

## 2023-07-19 NOTE — PATIENT INSTRUCTIONS
Your surgery is scheduled at [facility] in [place] for 2023    Pre-Op Testin/15/2023    ___x____ Lab (Clinic Lab 1st Floor)  ___x____ Chest X-ray ([facility] 1st Floor)  _______ EKG (Clinic 2nd Floor)  ___x____ Total Joint Patients Only--Cardiac/Medical Clearance appointment with Dr. Bautista on 08/15/2023 at 2:20p.m.    Our office will contact you the day before surgery to give you  the arrival time.    *Do NOT eat or drink anything after midnight the night before your surgery.    *Bring all medications in their original bottles.    *Bring anything you may need for an overnight stay.     *Bathe with Hibiclens the night or morning before surgery.    *The morning of your surgery ONLY take blood pressure medications, heart medications, medications for acid reflux, and thyroid medications (the morning dose only).  *Take these medications with a sip of water.    *Do not take Insulin or Diabetic Medications the night before or the morning of your surgery, unless directed otherwise.    *Be sure that you have stopped blood thinners at the appropriate time, as instructed.  (__5___ days prior to surgery)    *Bring your C-Pap machine if you have one.    *All jewelry and piercings MUST be removed prior to surgery.    *False eye lashes must be removed prior to surgery.    *Any questions regarding co-pays or deductibles with insurance, please contact the [facility] at #[phone].    *For Financial Assistance you may call #[phone] or #[phone].    Thank you for choosing Dr. William Bravo for your Orthopedic needs.  We look forward to caring for you. If you have any questions, please contact our office at [phone].

## 2023-07-19 NOTE — TELEPHONE ENCOUNTER
Called patient and she stated she was ready to schedule right TKA for 08/31/23. She has a cardiac clearance with Dr. Bautista on 08/15/23 @ 2:20p.m.and she is to do her labs and CXR prior to that appt.

## 2023-07-19 NOTE — TELEPHONE ENCOUNTER
----- Message from Lynne Lomeli sent at 7/19/2023  8:37 AM CDT -----  Regarding: about surgery  Patient is ready to set up surgery right knee . Call back [phone] or [phone]. Want to know can she have it 8/31.

## 2023-07-26 ENCOUNTER — TELEPHONE (OUTPATIENT)
Dept: ORTHOPEDICS | Facility: CLINIC | Age: 63
End: 2023-07-26
Payer: COMMERCIAL

## 2023-07-26 NOTE — TELEPHONE ENCOUNTER
----- Message from Louise Trinh sent at 7/26/2023  1:07 PM CDT -----  Pt is wanting to know if  her orders for ekg could be sent to Dr Bautista at [facility].  She is wanting to get it done on 8/15. Pt wants to be called when done.    [phone]

## 2023-08-22 ENCOUNTER — TELEPHONE (OUTPATIENT)
Dept: ORTHOPEDICS | Facility: CLINIC | Age: 63
End: 2023-08-22
Payer: COMMERCIAL

## 2023-08-22 DIAGNOSIS — Z96.651 STATUS POST TOTAL KNEE REPLACEMENT, RIGHT: Primary | ICD-10-CM

## 2023-08-22 NOTE — TELEPHONE ENCOUNTER
----- Message from Vidhya Orellana sent at 8/22/2023  2:09 PM CDT -----  Regarding: SURGERY  PATIENT HAS QUESTIONS ABOUT HER SURGERY.  CALL BACK NUMBER IS [phone].

## 2023-08-22 NOTE — PLAN OF CARE
Sw spoke with pt at joint ortho class meeting. Pt requests [facility]. Pt eventually wants outpatient therapy at [facility]. Pt also requests that sw check benefits for swingbed placement and coverage for cryocuff. Sw to call patient on this day and notify her of benefits.

## 2023-08-22 NOTE — TELEPHONE ENCOUNTER
Called patient and she stated that she had spoke to  in the hospital and that she didn't qualify for swing bed and she wanted a referral sent to Home Health.  Sent an order to Stephanie at [facility].

## 2023-08-30 NOTE — SUBJECTIVE & OBJECTIVE
Past Medical History:   Diagnosis Date    Atrial fibrillation     Hyperlipidemia     Hypertension        Past Surgical History:   Procedure Laterality Date    BREAST BIOPSY Right      SECTION      x2    CHOLECYSTECTOMY      EYE SURGERY Left     clogged eye duct surgery (bypass)    HYSTERECTOMY      TUBAL LIGATION         Review of patient's allergies indicates:   Allergen Reactions    Lovastatin Other (See Comments)    Pravastatin Other (See Comments)       No current facility-administered medications for this encounter.     Current Outpatient Medications   Medication Sig    calcium carbonate/vitamin D3 (CALTRATE 600 + D ORAL) Take 1 tablet by mouth 2 (two) times a day.    carvediloL (COREG) 25 MG tablet Take 1 tablet (25 mg total) by mouth 2 (two) times daily.    cyanocobalamin, vitamin B-12, 5,000 mcg TbDL Take 1 tablet by mouth Daily.    diltiaZEM (CARDIZEM CD) 240 MG 24 hr capsule Take 1 capsule (240 mg total) by mouth once daily.    fenofibrate (TRICOR) 145 MG tablet Take 1 tablet (145 mg total) by mouth once daily.    hydroCHLOROthiazide (HYDRODIURIL) 12.5 MG Tab Take 2 tablets (25 mg total) by mouth once daily.    loratadine (CLARITIN) 10 mg tablet Take 10 mg by mouth once daily.    magnesium chloride (MAG 64 ORAL) Take 1 tablet by mouth 2 (two) times a day.    multivit/folic acid/vit K1 (ONE-A-DAY WOMEN'S 50 PLUS ORAL) Take 1 tablet by mouth Daily.    olmesartan (BENICAR) 40 MG tablet Take 1 tablet (40 mg total) by mouth once daily.    rosuvastatin (CRESTOR) 20 MG tablet Take 1 tablet (20 mg total) by mouth once daily.    warfarin (COUMADIN) 5 MG tablet Take 1 tablet (5 mg total) by mouth Daily. Take 1 tab daily and 0.5 tab every Wed and Sun (Patient taking differently: Take 5 mg by mouth Daily. Take 1 tab daily and 0.5 tab every Mon and Wed)     Family History       Problem Relation (Age of Onset)    Breast cancer Sister, Sister, Sister    Heart attack Father    Heart disease Mother          Tobacco  Use    Smoking status: Never    Smokeless tobacco: Never   Substance and Sexual Activity    Alcohol use: Never    Drug use: Never    Sexual activity: Not on file     Review of Systems   Constitutional: Negative for decreased appetite.   HENT:  Negative for congestion and ear discharge.    Eyes:  Negative for blurred vision.   Cardiovascular:  Negative for chest pain and syncope.   Respiratory:  Negative for cough and wheezing.    Endocrine: Negative for cold intolerance and polyuria.   Hematologic/Lymphatic: Negative for adenopathy and bleeding problem.   Skin:  Negative for color change, nail changes and suspicious lesions.   Musculoskeletal:  Positive for joint pain. Negative for muscle cramps and myalgias.   Gastrointestinal:  Negative for bloating and abdominal pain.   Genitourinary:  Negative for frequency and hematuria.   Neurological:  Negative for brief paralysis, sensory change and weakness.   Psychiatric/Behavioral:  Negative for altered mental status.    Allergic/Immunologic: Negative for hives.     Objective:     Vital Signs (Most Recent):    Vital Signs (24h Range):              There is no height or weight on file to calculate BMI.    No intake or output data in the 24 hours ending 08/30/23 1802         General Musculoskeletal Exam   Gait: antalgic       Right Knee Exam     Inspection   Effusion: present    Tenderness   The patient is tender to palpation of the medial joint line and lateral joint line.    Crepitus   The patient has crepitus of the medial joint line.    Other   Sensation: normal    Vascular Exam     Right Pulses  Dorsalis Pedis:      2+             Significant Labs: All pertinent labs within the past 24 hours have been reviewed.    Significant Imaging: I have reviewed all pertinent imaging results/findings.

## 2023-08-30 NOTE — H&P
[facility] - Orthopedic Periop Services  Orthopedics  H&P    Patient Name: Heidy Moyer  MRN: [id number]  Admission Date: (Not on file)  Primary Care Provider: Nakia Camarillo FNP    Patient information was obtained from patient and past medical records.     Subjective:     Principal Problem:<principal problem not specified>    Chief Complaint: No chief complaint on file.       HPI: 62-year-old female with severe degenerative joint disease of the right knee who is failed non operative treatment including walking with a cane for greater than 3 months now risk for falls needing total knee arthroplasty on the right  Right lower extremity she moves her toes has sensation to touch has palpable pulses tender palpation over her medial joint line and lateral joint line there is some crepitus on motion has range of motion 5-100 degrees of flexion no instability in the knee noted there is an effusion  X-rays show severe degenerative changes tricompartmental right knee  Impression severe degenerative joint disease right knee  Plan total knee arthroplasty on the right with or without patellar resurfacing      Past Medical History:   Diagnosis Date    Atrial fibrillation     Hyperlipidemia     Hypertension        Past Surgical History:   Procedure Laterality Date    BREAST BIOPSY Right      SECTION      x2    CHOLECYSTECTOMY      EYE SURGERY Left     clogged eye duct surgery (bypass)    HYSTERECTOMY      TUBAL LIGATION         Review of patient's allergies indicates:   Allergen Reactions    Lovastatin Other (See Comments)    Pravastatin Other (See Comments)       No current facility-administered medications for this encounter.     Current Outpatient Medications   Medication Sig    calcium carbonate/vitamin D3 (CALTRATE 600 + D ORAL) Take 1 tablet by mouth 2 (two) times a day.    carvediloL (COREG) 25 MG tablet Take 1 tablet (25 mg total) by mouth 2 (two) times daily.    cyanocobalamin,  vitamin B-12, 5,000 mcg TbDL Take 1 tablet by mouth Daily.    diltiaZEM (CARDIZEM CD) 240 MG 24 hr capsule Take 1 capsule (240 mg total) by mouth once daily.    fenofibrate (TRICOR) 145 MG tablet Take 1 tablet (145 mg total) by mouth once daily.    hydroCHLOROthiazide (HYDRODIURIL) 12.5 MG Tab Take 2 tablets (25 mg total) by mouth once daily.    loratadine (CLARITIN) 10 mg tablet Take 10 mg by mouth once daily.    magnesium chloride (MAG 64 ORAL) Take 1 tablet by mouth 2 (two) times a day.    multivit/folic acid/vit K1 (ONE-A-DAY WOMEN'S 50 PLUS ORAL) Take 1 tablet by mouth Daily.    olmesartan (BENICAR) 40 MG tablet Take 1 tablet (40 mg total) by mouth once daily.    rosuvastatin (CRESTOR) 20 MG tablet Take 1 tablet (20 mg total) by mouth once daily.    warfarin (COUMADIN) 5 MG tablet Take 1 tablet (5 mg total) by mouth Daily. Take 1 tab daily and 0.5 tab every Wed and Sun (Patient taking differently: Take 5 mg by mouth Daily. Take 1 tab daily and 0.5 tab every Mon and Wed)     Family History       Problem Relation (Age of Onset)    Breast cancer Sister, Sister, Sister    Heart attack Father    Heart disease Mother          Tobacco Use    Smoking status: Never    Smokeless tobacco: Never   Substance and Sexual Activity    Alcohol use: Never    Drug use: Never    Sexual activity: Not on file     Review of Systems   Constitutional: Negative for decreased appetite.   HENT:  Negative for congestion and ear discharge.    Eyes:  Negative for blurred vision.   Cardiovascular:  Negative for chest pain and syncope.   Respiratory:  Negative for cough and wheezing.    Endocrine: Negative for cold intolerance and polyuria.   Hematologic/Lymphatic: Negative for adenopathy and bleeding problem.   Skin:  Negative for color change, nail changes and suspicious lesions.   Musculoskeletal:  Positive for joint pain. Negative for muscle cramps and myalgias.   Gastrointestinal:  Negative for bloating and abdominal pain.    Genitourinary:  Negative for frequency and hematuria.   Neurological:  Negative for brief paralysis, sensory change and weakness.   Psychiatric/Behavioral:  Negative for altered mental status.    Allergic/Immunologic: Negative for hives.     Objective:     Vital Signs (Most Recent):    Vital Signs (24h Range):              There is no height or weight on file to calculate BMI.    No intake or output data in the 24 hours ending 08/30/23 1802         General Musculoskeletal Exam   Gait: antalgic       Right Knee Exam     Inspection   Effusion: present    Tenderness   The patient is tender to palpation of the medial joint line and lateral joint line.    Crepitus   The patient has crepitus of the medial joint line.    Other   Sensation: normal    Vascular Exam     Right Pulses  Dorsalis Pedis:      2+             Significant Labs: All pertinent labs within the past 24 hours have been reviewed.    Significant Imaging: I have reviewed all pertinent imaging results/findings.    Assessment/Plan:     No notes have been filed under this hospital service.  Service: Orthopedic Surgery      William Bravo MD  Orthopedics  [facility] - Orthopedic Periop Services

## 2023-08-30 NOTE — HPI
62-year-old female with severe degenerative joint disease of the right knee who is failed non operative treatment including walking with a cane for greater than 3 months now risk for falls needing total knee arthroplasty on the right  Right lower extremity she moves her toes has sensation to touch has palpable pulses tender palpation over her medial joint line and lateral joint line there is some crepitus on motion has range of motion 5-100 degrees of flexion no instability in the knee noted there is an effusion  X-rays show severe degenerative changes tricompartmental right knee  Impression severe degenerative joint disease right knee  Plan total knee arthroplasty on the right with or without patellar resurfacing

## 2023-08-31 ENCOUNTER — ANESTHESIA (OUTPATIENT)
Dept: SURGERY | Facility: HOSPITAL | Age: 63
End: 2023-08-31
Payer: COMMERCIAL

## 2023-08-31 ENCOUNTER — ANESTHESIA EVENT (OUTPATIENT)
Dept: SURGERY | Facility: HOSPITAL | Age: 63
End: 2023-08-31
Payer: COMMERCIAL

## 2023-08-31 ENCOUNTER — HOSPITAL ENCOUNTER (OUTPATIENT)
Facility: HOSPITAL | Age: 63
Discharge: HOME HEALTH | End: 2023-09-01
Attending: ORTHOPAEDIC SURGERY | Admitting: ORTHOPAEDIC SURGERY
Payer: COMMERCIAL

## 2023-08-31 DIAGNOSIS — M17.11 ARTHRITIS OF RIGHT KNEE: ICD-10-CM

## 2023-08-31 LAB
ANION GAP SERPL CALCULATED.3IONS-SCNC: 10 MMOL/L (ref 7–16)
BASOPHILS # BLD AUTO: 0.02 K/UL (ref 0–0.2)
BASOPHILS NFR BLD AUTO: 0.3 % (ref 0–1)
BUN SERPL-MCNC: 20 MG/DL (ref 7–18)
BUN/CREAT SERPL: 18 (ref 6–20)
CALCIUM SERPL-MCNC: 8.4 MG/DL (ref 8.5–10.1)
CHLORIDE SERPL-SCNC: 109 MMOL/L (ref 98–107)
CO2 SERPL-SCNC: 26 MMOL/L (ref 21–32)
CREAT SERPL-MCNC: 1.12 MG/DL (ref 0.55–1.02)
DIFFERENTIAL METHOD BLD: ABNORMAL
EGFR (NO RACE VARIABLE) (RUSH/TITUS): 55 ML/MIN/1.73M2
EOSINOPHIL # BLD AUTO: 0.05 K/UL (ref 0–0.5)
EOSINOPHIL NFR BLD AUTO: 0.8 % (ref 1–4)
ERYTHROCYTE [DISTWIDTH] IN BLOOD BY AUTOMATED COUNT: 14.5 % (ref 11.5–14.5)
GLUCOSE SERPL-MCNC: 126 MG/DL (ref 74–106)
HCT VFR BLD AUTO: 39.2 % (ref 38–47)
HGB BLD-MCNC: 13.2 G/DL (ref 12–16)
IMM GRANULOCYTES # BLD AUTO: 0.02 K/UL (ref 0–0.04)
IMM GRANULOCYTES NFR BLD: 0.3 % (ref 0–0.4)
INR BLD: 1.13
LYMPHOCYTES # BLD AUTO: 0.88 K/UL (ref 1–4.8)
LYMPHOCYTES NFR BLD AUTO: 14.1 % (ref 27–41)
MCH RBC QN AUTO: 32 PG (ref 27–31)
MCHC RBC AUTO-ENTMCNC: 33.7 G/DL (ref 32–36)
MCV RBC AUTO: 94.9 FL (ref 80–96)
MONOCYTES # BLD AUTO: 0.13 K/UL (ref 0–0.8)
MONOCYTES NFR BLD AUTO: 2.1 % (ref 2–6)
MPC BLD CALC-MCNC: 11.1 FL (ref 9.4–12.4)
NEUTROPHILS # BLD AUTO: 5.13 K/UL (ref 1.8–7.7)
NEUTROPHILS NFR BLD AUTO: 82.4 % (ref 53–65)
NRBC # BLD AUTO: 0 X10E3/UL
NRBC, AUTO (.00): 0 %
PLATELET # BLD AUTO: 247 K/UL (ref 150–400)
POTASSIUM SERPL-SCNC: 4.5 MMOL/L (ref 3.5–5.1)
PROTHROMBIN TIME: 14.4 SECONDS (ref 11.7–14.7)
RBC # BLD AUTO: 4.13 M/UL (ref 4.2–5.4)
SODIUM SERPL-SCNC: 140 MMOL/L (ref 136–145)
WBC # BLD AUTO: 6.23 K/UL (ref 4.5–11)

## 2023-08-31 PROCEDURE — 27000716 HC OXISENSOR PROBE, ANY SIZE: Performed by: NURSE ANESTHETIST, CERTIFIED REGISTERED

## 2023-08-31 PROCEDURE — 99900035 HC TECH TIME PER 15 MIN (STAT)

## 2023-08-31 PROCEDURE — 85610 PROTHROMBIN TIME: CPT | Performed by: ORTHOPAEDIC SURGERY

## 2023-08-31 PROCEDURE — 27447 TOTAL KNEE ARTHROPLASTY: CPT | Mod: RT,,, | Performed by: ORTHOPAEDIC SURGERY

## 2023-08-31 PROCEDURE — 63600175 PHARM REV CODE 636 W HCPCS: Performed by: ORTHOPAEDIC SURGERY

## 2023-08-31 PROCEDURE — 27201423 OPTIME MED/SURG SUP & DEVICES STERILE SUPPLY: Performed by: ORTHOPAEDIC SURGERY

## 2023-08-31 PROCEDURE — 80048 BASIC METABOLIC PNL TOTAL CA: CPT | Performed by: ORTHOPAEDIC SURGERY

## 2023-08-31 PROCEDURE — 27447 PR TOTAL KNEE ARTHROPLASTY: ICD-10-PCS | Mod: RT,,, | Performed by: ORTHOPAEDIC SURGERY

## 2023-08-31 PROCEDURE — 63600175 PHARM REV CODE 636 W HCPCS: Performed by: FAMILY MEDICINE

## 2023-08-31 PROCEDURE — 71000016 HC POSTOP RECOV ADDL HR: Performed by: ORTHOPAEDIC SURGERY

## 2023-08-31 PROCEDURE — 25000003 PHARM REV CODE 250: Performed by: ORTHOPAEDIC SURGERY

## 2023-08-31 PROCEDURE — 36000712 HC OR TIME LEV V 1ST 15 MIN: Performed by: ORTHOPAEDIC SURGERY

## 2023-08-31 PROCEDURE — 25000003 PHARM REV CODE 250: Performed by: NURSE ANESTHETIST, CERTIFIED REGISTERED

## 2023-08-31 PROCEDURE — 27000510 HC BLANKET BAIR HUGGER ANY SIZE: Performed by: NURSE ANESTHETIST, CERTIFIED REGISTERED

## 2023-08-31 PROCEDURE — 0055T PR COMPUTER-ASSIST MUSCSKEL NAVIG, ORTHO PROC, CT/MRI: ICD-10-PCS | Mod: ,,, | Performed by: ORTHOPAEDIC SURGERY

## 2023-08-31 PROCEDURE — 99214 PR OFFICE/OUTPT VISIT, EST, LEVL IV, 30-39 MIN: ICD-10-PCS | Mod: ICN,,, | Performed by: FAMILY MEDICINE

## 2023-08-31 PROCEDURE — D9220A PRA ANESTHESIA: ICD-10-PCS | Mod: ANES,,, | Performed by: ANESTHESIOLOGY

## 2023-08-31 PROCEDURE — 63600175 PHARM REV CODE 636 W HCPCS: Performed by: NURSE ANESTHETIST, CERTIFIED REGISTERED

## 2023-08-31 PROCEDURE — 99214 OFFICE O/P EST MOD 30 MIN: CPT | Mod: ICN,,, | Performed by: FAMILY MEDICINE

## 2023-08-31 PROCEDURE — 63600175 PHARM REV CODE 636 W HCPCS: Performed by: ANESTHESIOLOGY

## 2023-08-31 PROCEDURE — 27000177 HC AIRWAY, LARYNGEAL MASK: Performed by: NURSE ANESTHETIST, CERTIFIED REGISTERED

## 2023-08-31 PROCEDURE — 71000015 HC POSTOP RECOV 1ST HR: Performed by: ORTHOPAEDIC SURGERY

## 2023-08-31 PROCEDURE — D9220A PRA ANESTHESIA: Mod: CRNA,,, | Performed by: NURSE ANESTHETIST, CERTIFIED REGISTERED

## 2023-08-31 PROCEDURE — 36000713 HC OR TIME LEV V EA ADD 15 MIN: Performed by: ORTHOPAEDIC SURGERY

## 2023-08-31 PROCEDURE — C1713 ANCHOR/SCREW BN/BN,TIS/BN: HCPCS | Performed by: ORTHOPAEDIC SURGERY

## 2023-08-31 PROCEDURE — C1776 JOINT DEVICE (IMPLANTABLE): HCPCS | Performed by: ORTHOPAEDIC SURGERY

## 2023-08-31 PROCEDURE — 85025 COMPLETE CBC W/AUTO DIFF WBC: CPT | Performed by: ORTHOPAEDIC SURGERY

## 2023-08-31 PROCEDURE — 97165 OT EVAL LOW COMPLEX 30 MIN: CPT

## 2023-08-31 PROCEDURE — D9220A PRA ANESTHESIA: Mod: ANES,,, | Performed by: ANESTHESIOLOGY

## 2023-08-31 PROCEDURE — 37000009 HC ANESTHESIA EA ADD 15 MINS: Performed by: ORTHOPAEDIC SURGERY

## 2023-08-31 PROCEDURE — 0055T BONE SRGRY CMPTR CT/MRI IMAG: CPT | Mod: ,,, | Performed by: ORTHOPAEDIC SURGERY

## 2023-08-31 PROCEDURE — C1769 GUIDE WIRE: HCPCS | Performed by: ORTHOPAEDIC SURGERY

## 2023-08-31 PROCEDURE — 27201960 HC SPINAL TRAY: Performed by: NURSE ANESTHETIST, CERTIFIED REGISTERED

## 2023-08-31 PROCEDURE — 27200750 HC INSULATED NEEDLE/ STIMUPLEX: Performed by: NURSE ANESTHETIST, CERTIFIED REGISTERED

## 2023-08-31 PROCEDURE — 97161 PT EVAL LOW COMPLEX 20 MIN: CPT

## 2023-08-31 PROCEDURE — 37000008 HC ANESTHESIA 1ST 15 MINUTES: Performed by: ORTHOPAEDIC SURGERY

## 2023-08-31 PROCEDURE — 71000033 HC RECOVERY, INTIAL HOUR: Performed by: ORTHOPAEDIC SURGERY

## 2023-08-31 PROCEDURE — D9220A PRA ANESTHESIA: ICD-10-PCS | Mod: CRNA,,, | Performed by: NURSE ANESTHETIST, CERTIFIED REGISTERED

## 2023-08-31 DEVICE — PRIMARY TIBIAL BASEPLATE
Type: IMPLANTABLE DEVICE | Site: KNEE | Status: FUNCTIONAL
Brand: TRIATHLON

## 2023-08-31 DEVICE — CRUCIATE RETAINING FEMORAL
Type: IMPLANTABLE DEVICE | Site: KNEE | Status: FUNCTIONAL
Brand: TRIATHLON

## 2023-08-31 DEVICE — ASYMMETRIC PATELLA
Type: IMPLANTABLE DEVICE | Site: KNEE | Status: FUNCTIONAL
Brand: TRIATHLON

## 2023-08-31 DEVICE — CEMENT BONE SURG SMPLX P RADPQ: Type: IMPLANTABLE DEVICE | Site: KNEE | Status: FUNCTIONAL

## 2023-08-31 DEVICE — TIBIAL BEARING INSERT - CS
Type: IMPLANTABLE DEVICE | Site: KNEE | Status: FUNCTIONAL
Brand: TRIATHLON

## 2023-08-31 RX ORDER — CEFAZOLIN SODIUM 1 G/3ML
INJECTION, POWDER, FOR SOLUTION INTRAMUSCULAR; INTRAVENOUS
Status: DISCONTINUED | OUTPATIENT
Start: 2023-08-31 | End: 2023-08-31

## 2023-08-31 RX ORDER — SODIUM CHLORIDE, SODIUM LACTATE, POTASSIUM CHLORIDE, CALCIUM CHLORIDE 600; 310; 30; 20 MG/100ML; MG/100ML; MG/100ML; MG/100ML
INJECTION, SOLUTION INTRAVENOUS CONTINUOUS PRN
Status: DISCONTINUED | OUTPATIENT
Start: 2023-08-31 | End: 2023-08-31

## 2023-08-31 RX ORDER — PROPOFOL 10 MG/ML
VIAL (ML) INTRAVENOUS
Status: DISCONTINUED | OUTPATIENT
Start: 2023-08-31 | End: 2023-08-31

## 2023-08-31 RX ORDER — DOCUSATE SODIUM 100 MG/1
100 CAPSULE, LIQUID FILLED ORAL EVERY 12 HOURS
Status: DISCONTINUED | OUTPATIENT
Start: 2023-08-31 | End: 2023-09-01 | Stop reason: HOSPADM

## 2023-08-31 RX ORDER — MORPHINE SULFATE 4 MG/ML
4 INJECTION, SOLUTION INTRAMUSCULAR; INTRAVENOUS EVERY 4 HOURS PRN
Status: DISCONTINUED | OUTPATIENT
Start: 2023-08-31 | End: 2023-09-01 | Stop reason: HOSPADM

## 2023-08-31 RX ORDER — PHENYLEPHRINE HYDROCHLORIDE 10 MG/ML
INJECTION INTRAVENOUS
Status: DISCONTINUED | OUTPATIENT
Start: 2023-08-31 | End: 2023-08-31

## 2023-08-31 RX ORDER — MORPHINE SULFATE 10 MG/ML
4 INJECTION INTRAMUSCULAR; INTRAVENOUS; SUBCUTANEOUS EVERY 4 HOURS PRN
Status: DISCONTINUED | OUTPATIENT
Start: 2023-08-31 | End: 2023-08-31

## 2023-08-31 RX ORDER — SODIUM CHLORIDE 9 MG/ML
INJECTION, SOLUTION INTRAVENOUS CONTINUOUS PRN
Status: DISCONTINUED | OUTPATIENT
Start: 2023-08-31 | End: 2023-08-31

## 2023-08-31 RX ORDER — WARFARIN SODIUM 5 MG/1
5 TABLET ORAL DAILY
Status: DISCONTINUED | OUTPATIENT
Start: 2023-08-31 | End: 2023-09-01 | Stop reason: HOSPADM

## 2023-08-31 RX ORDER — DEXAMETHASONE SODIUM PHOSPHATE 4 MG/ML
INJECTION, SOLUTION INTRA-ARTICULAR; INTRALESIONAL; INTRAMUSCULAR; INTRAVENOUS; SOFT TISSUE
Status: DISCONTINUED | OUTPATIENT
Start: 2023-08-31 | End: 2023-08-31

## 2023-08-31 RX ORDER — MIDAZOLAM HYDROCHLORIDE 1 MG/ML
INJECTION INTRAMUSCULAR; INTRAVENOUS
Status: DISCONTINUED | OUTPATIENT
Start: 2023-08-31 | End: 2023-08-31

## 2023-08-31 RX ORDER — CARVEDILOL 25 MG/1
25 TABLET ORAL 2 TIMES DAILY
Status: DISCONTINUED | OUTPATIENT
Start: 2023-08-31 | End: 2023-09-01 | Stop reason: HOSPADM

## 2023-08-31 RX ORDER — LIDOCAINE HYDROCHLORIDE 20 MG/ML
INJECTION, SOLUTION EPIDURAL; INFILTRATION; INTRACAUDAL; PERINEURAL
Status: DISCONTINUED | OUTPATIENT
Start: 2023-08-31 | End: 2023-08-31

## 2023-08-31 RX ORDER — FENOFIBRATE 145 MG/1
145 TABLET, FILM COATED ORAL DAILY
Status: DISCONTINUED | OUTPATIENT
Start: 2023-08-31 | End: 2023-09-01 | Stop reason: HOSPADM

## 2023-08-31 RX ORDER — LOSARTAN POTASSIUM 25 MG/1
25 TABLET ORAL DAILY
Status: DISCONTINUED | OUTPATIENT
Start: 2023-08-31 | End: 2023-09-01 | Stop reason: HOSPADM

## 2023-08-31 RX ORDER — ONDANSETRON 2 MG/ML
4 INJECTION INTRAMUSCULAR; INTRAVENOUS EVERY 8 HOURS PRN
Status: DISCONTINUED | OUTPATIENT
Start: 2023-08-31 | End: 2023-09-01 | Stop reason: HOSPADM

## 2023-08-31 RX ORDER — EPHEDRINE SULFATE 50 MG/ML
INJECTION, SOLUTION INTRAVENOUS
Status: DISCONTINUED | OUTPATIENT
Start: 2023-08-31 | End: 2023-08-31

## 2023-08-31 RX ORDER — HYDROCHLOROTHIAZIDE 25 MG/1
25 TABLET ORAL DAILY
Status: DISCONTINUED | OUTPATIENT
Start: 2023-08-31 | End: 2023-09-01 | Stop reason: HOSPADM

## 2023-08-31 RX ORDER — ONDANSETRON 2 MG/ML
INJECTION INTRAMUSCULAR; INTRAVENOUS
Status: DISCONTINUED | OUTPATIENT
Start: 2023-08-31 | End: 2023-08-31

## 2023-08-31 RX ORDER — ONDANSETRON 2 MG/ML
4 INJECTION INTRAMUSCULAR; INTRAVENOUS DAILY PRN
Status: DISCONTINUED | OUTPATIENT
Start: 2023-08-31 | End: 2023-08-31 | Stop reason: HOSPADM

## 2023-08-31 RX ORDER — MEPERIDINE HYDROCHLORIDE 25 MG/ML
25 INJECTION INTRAMUSCULAR; INTRAVENOUS; SUBCUTANEOUS EVERY 10 MIN PRN
Status: DISCONTINUED | OUTPATIENT
Start: 2023-08-31 | End: 2023-08-31 | Stop reason: HOSPADM

## 2023-08-31 RX ORDER — DILTIAZEM HYDROCHLORIDE 240 MG/1
240 CAPSULE, COATED, EXTENDED RELEASE ORAL DAILY
Status: DISCONTINUED | OUTPATIENT
Start: 2023-08-31 | End: 2023-09-01 | Stop reason: HOSPADM

## 2023-08-31 RX ORDER — HYDROMORPHONE HYDROCHLORIDE 2 MG/ML
0.5 INJECTION, SOLUTION INTRAMUSCULAR; INTRAVENOUS; SUBCUTANEOUS EVERY 5 MIN PRN
Status: DISCONTINUED | OUTPATIENT
Start: 2023-08-31 | End: 2023-08-31 | Stop reason: HOSPADM

## 2023-08-31 RX ORDER — MORPHINE SULFATE 10 MG/ML
4 INJECTION INTRAMUSCULAR; INTRAVENOUS; SUBCUTANEOUS EVERY 5 MIN PRN
Status: DISCONTINUED | OUTPATIENT
Start: 2023-08-31 | End: 2023-08-31 | Stop reason: HOSPADM

## 2023-08-31 RX ORDER — SODIUM CHLORIDE 9 MG/ML
INJECTION, SOLUTION INTRAVENOUS CONTINUOUS
Status: DISCONTINUED | OUTPATIENT
Start: 2023-08-31 | End: 2023-09-01 | Stop reason: HOSPADM

## 2023-08-31 RX ORDER — DIPHENHYDRAMINE HYDROCHLORIDE 50 MG/ML
25 INJECTION INTRAMUSCULAR; INTRAVENOUS EVERY 6 HOURS PRN
Status: DISCONTINUED | OUTPATIENT
Start: 2023-08-31 | End: 2023-08-31 | Stop reason: HOSPADM

## 2023-08-31 RX ORDER — CETIRIZINE HYDROCHLORIDE 10 MG/1
10 TABLET ORAL DAILY
Status: DISCONTINUED | OUTPATIENT
Start: 2023-08-31 | End: 2023-09-01 | Stop reason: HOSPADM

## 2023-08-31 RX ORDER — BISACODYL 10 MG
10 SUPPOSITORY, RECTAL RECTAL DAILY PRN
Status: DISCONTINUED | OUTPATIENT
Start: 2023-08-31 | End: 2023-09-01 | Stop reason: HOSPADM

## 2023-08-31 RX ORDER — IPRATROPIUM BROMIDE AND ALBUTEROL SULFATE 2.5; .5 MG/3ML; MG/3ML
3 SOLUTION RESPIRATORY (INHALATION) ONCE
Status: DISCONTINUED | OUTPATIENT
Start: 2023-08-31 | End: 2023-08-31 | Stop reason: HOSPADM

## 2023-08-31 RX ORDER — TRANEXAMIC ACID 100 MG/ML
INJECTION, SOLUTION INTRAVENOUS
Status: DISCONTINUED | OUTPATIENT
Start: 2023-08-31 | End: 2023-08-31

## 2023-08-31 RX ORDER — HYDROCODONE BITARTRATE AND ACETAMINOPHEN 5; 325 MG/1; MG/1
1 TABLET ORAL EVERY 4 HOURS PRN
Status: DISCONTINUED | OUTPATIENT
Start: 2023-08-31 | End: 2023-09-01 | Stop reason: HOSPADM

## 2023-08-31 RX ORDER — ATORVASTATIN CALCIUM 10 MG/1
10 TABLET, FILM COATED ORAL DAILY
Status: DISCONTINUED | OUTPATIENT
Start: 2023-08-31 | End: 2023-09-01 | Stop reason: HOSPADM

## 2023-08-31 RX ORDER — ENOXAPARIN SODIUM 150 MG/ML
1 INJECTION SUBCUTANEOUS EVERY 12 HOURS
Status: DISCONTINUED | OUTPATIENT
Start: 2023-08-31 | End: 2023-09-01 | Stop reason: HOSPADM

## 2023-08-31 RX ADMIN — PHENYLEPHRINE HYDROCHLORIDE 200 MCG: 10 INJECTION INTRAVENOUS at 11:08

## 2023-08-31 RX ADMIN — CEFAZOLIN 2 G: 1 INJECTION, POWDER, FOR SOLUTION INTRAMUSCULAR; INTRAVENOUS; PARENTERAL at 09:08

## 2023-08-31 RX ADMIN — SODIUM CHLORIDE: 9 INJECTION, SOLUTION INTRAVENOUS at 05:08

## 2023-08-31 RX ADMIN — PHENYLEPHRINE HYDROCHLORIDE 200 MCG: 10 INJECTION INTRAVENOUS at 09:08

## 2023-08-31 RX ADMIN — ROPIVACAINE HYDROCHLORIDE 30 ML: 7.5 INJECTION, SOLUTION EPIDURAL; PERINEURAL at 08:08

## 2023-08-31 RX ADMIN — HYDROCODONE BITARTRATE AND ACETAMINOPHEN 1 TABLET: 5; 325 TABLET ORAL at 01:08

## 2023-08-31 RX ADMIN — HYDROCODONE BITARTRATE AND ACETAMINOPHEN 1 TABLET: 5; 325 TABLET ORAL at 08:08

## 2023-08-31 RX ADMIN — SODIUM CHLORIDE, POTASSIUM CHLORIDE, SODIUM LACTATE AND CALCIUM CHLORIDE: 600; 310; 30; 20 INJECTION, SOLUTION INTRAVENOUS at 09:08

## 2023-08-31 RX ADMIN — VANCOMYCIN HYDROCHLORIDE 1000 MG: 1 INJECTION, POWDER, LYOPHILIZED, FOR SOLUTION INTRAVENOUS at 08:08

## 2023-08-31 RX ADMIN — SODIUM CHLORIDE: 9 INJECTION, SOLUTION INTRAVENOUS at 06:08

## 2023-08-31 RX ADMIN — LIDOCAINE HYDROCHLORIDE 50 MG: 20 INJECTION, SOLUTION INTRAVENOUS at 08:08

## 2023-08-31 RX ADMIN — ENOXAPARIN SODIUM 120 MG: 120 INJECTION SUBCUTANEOUS at 08:08

## 2023-08-31 RX ADMIN — PROPOFOL 50 MG: 10 INJECTION, EMULSION INTRAVENOUS at 08:08

## 2023-08-31 RX ADMIN — EPHEDRINE SULFATE 15 MG: 50 INJECTION INTRAVENOUS at 09:08

## 2023-08-31 RX ADMIN — SODIUM CHLORIDE: 9 INJECTION, SOLUTION INTRAVENOUS at 08:08

## 2023-08-31 RX ADMIN — VANCOMYCIN HYDROCHLORIDE 2000 MG: 1 INJECTION, POWDER, LYOPHILIZED, FOR SOLUTION INTRAVENOUS at 08:08

## 2023-08-31 RX ADMIN — EPHEDRINE SULFATE 20 MG: 50 INJECTION INTRAVENOUS at 09:08

## 2023-08-31 RX ADMIN — MIDAZOLAM HYDROCHLORIDE 2 MG: 1 INJECTION, SOLUTION INTRAMUSCULAR; INTRAVENOUS at 08:08

## 2023-08-31 RX ADMIN — TRANEXAMIC ACID 1000 MG: 100 INJECTION, SOLUTION INTRAVENOUS at 08:08

## 2023-08-31 RX ADMIN — WARFARIN SODIUM 5 MG: 5 TABLET ORAL at 05:08

## 2023-08-31 RX ADMIN — DOCUSATE SODIUM 100 MG: 100 CAPSULE, LIQUID FILLED ORAL at 08:08

## 2023-08-31 RX ADMIN — CEFAZOLIN 2 G: 2 INJECTION, POWDER, FOR SOLUTION INTRAMUSCULAR; INTRAVENOUS at 05:08

## 2023-08-31 RX ADMIN — ONDANSETRON 4 MG: 2 INJECTION INTRAMUSCULAR; INTRAVENOUS at 08:08

## 2023-08-31 RX ADMIN — MORPHINE SULFATE 4 MG: 4 INJECTION, SOLUTION INTRAMUSCULAR; INTRAVENOUS at 04:08

## 2023-08-31 RX ADMIN — PHENYLEPHRINE HYDROCHLORIDE 300 MCG: 10 INJECTION INTRAVENOUS at 09:08

## 2023-08-31 RX ADMIN — DEXAMETHASONE SODIUM PHOSPHATE 10 MG: 4 INJECTION, SOLUTION INTRA-ARTICULAR; INTRALESIONAL; INTRAMUSCULAR; INTRAVENOUS; SOFT TISSUE at 08:08

## 2023-08-31 RX ADMIN — CARVEDILOL 25 MG: 25 TABLET, FILM COATED ORAL at 08:08

## 2023-08-31 RX ADMIN — TRANEXAMIC ACID 1000 MG: 100 INJECTION, SOLUTION INTRAVENOUS at 10:08

## 2023-08-31 NOTE — ANESTHESIA PREPROCEDURE EVALUATION
08/31/2023  Heidy Moyer is a 62 y.o., female.      Pre-op Assessment    I have reviewed the Patient Summary Reports.     I have reviewed the Nursing Notes. I have reviewed the NPO Status.   I have reviewed the Medications.     Review of Systems  Anesthesia Hx:  Denies Family Hx of Anesthesia complications.   Denies Personal Hx of Anesthesia complications.   Social:  Non-Smoker, No Alcohol Use    Hematology/Oncology:  Hematology Normal   Oncology Normal     EENT/Dental:EENT/Dental Normal   Cardiovascular:   Hypertension Dysrhythmias atrial fibrillation hyperlipidemia ECG has been reviewed. Cardiac preop evaluation as low cardiac risk   Pulmonary:  Pulmonary Normal    Renal/:  Renal/ Normal     Hepatic/GI:  Hepatic/GI Normal    Musculoskeletal:  Musculoskeletal Normal    Neurological:  Neurology Normal    Endocrine:  Endocrine Normal  Obesity / BMI > 30  Dermatological:  Skin Normal    Psych:  Psychiatric Normal           Physical Exam  General: Well nourished, Cooperative, Alert and Oriented    Airway:  Mallampati: III / III  Mouth Opening: Normal  TM Distance: Normal  Neck ROM: Normal ROM    Dental:  Intact    Chest/Lungs:  Clear to auscultation    Heart:  Rate: Normal  Rhythm: Regular Rhythm  Sounds: Normal        Chemistry        Component Value Date/Time     08/29/2022 1549    K 4.3 08/29/2022 1549     08/29/2022 1549    CO2 27 08/29/2022 1549    BUN 25 (H) 08/29/2022 1549    CREATININE 1.15 (H) 08/29/2022 1549     08/29/2022 1549        Component Value Date/Time    CALCIUM 10.0 08/29/2022 1549        Lab Results   Component Value Date    WBC 6.55 08/29/2022    HGB 14.6 08/29/2022    HCT 44.0 08/29/2022     08/29/2022     No results found for this or any previous visit.      Anesthesia Plan  Type of Anesthesia, risks & benefits discussed:    Anesthesia Type:  Regional, Gen Supraglottic Airway, Spinal  Intra-op Monitoring Plan: Standard ASA Monitors  Post Op Pain Control Plan: multimodal analgesia and peripheral nerve block  Induction:  IV  Airway Plan: Direct  Informed Consent: Informed consent signed with the Patient and all parties understand the risks and agree with anesthesia plan.  All questions answered.   ASA Score: 3  Day of Surgery Review of History & Physical: H&P Update referred to the surgeon/provider.I have interviewed and examined the patient. I have reviewed the patient's H&P dated: There are no significant changes.     Ready For Surgery From Anesthesia Perspective.     .

## 2023-08-31 NOTE — OR NURSING
Pt taken to or. Updated inr needed per surgeon. Iv started/ lab drawn. Pt taken back to ops room to wait on lab results.

## 2023-08-31 NOTE — CONSULTS
[facility] - Orthopedic Periop Services  [facility] Medicine  Consult Note    Patient Name: Heidy Moyer  MRN: [id number]  Admission Date: 8/31/2023  Hospital Length of Stay: 0 days  Attending Physician: William Bravo MD   Primary Care Provider: Nakia Camarillo FNP           Patient information was obtained from patient and past medical records.     Consults  Subjective:     Principal Problem: Arthritis of right knee    Chief Complaint: No chief complaint on file.       HPI: Heidy Moyer is a 67-year-old female with history of HTN, HLD, a-fib, and OA. She is status post right total knee arthoplasty by Dr. Bravo earlier today. Patient tolerated procedure well with no immediate post-operative complications. Medicine consulted for management of chronic conditions.     Patient in no acute distress. Vital signs stable and grossly within normal limits. Pre-operative labs unremarkable. Initial case management assessment completed with plans to discharge home with home health. PT/OT evaluation ordered and pending.       No new subjective & objective note has been filed under this hospital service since the last note was generated.    Assessment/Plan:     * Arthritis of right knee  Status post right total knee arthoplasty, tolerated well with no immediate complications. PT/OT evaluation pending. Current plan to discharge home with home health. Pain management, weight bearing, disposition per primary.       Atrial fibrillation  Patient with Paroxysmal (<7 days) atrial fibrillation which is controlled currently with Beta Blocker and Calcium Channel Blocker. Patient is currently in sinus rhythm.[id number] Score: 1. Anticoagulation indicated. Anticoagulation done with warfarin.    INR 1.3 this AM, no other recent values available. Will resume warfarin with LMWH bridge.    Hyperlipidemia  Continue statin.      Hypertension  Controlled. Continue home medications and monitor.         VTE Risk Mitigation  (From admission, onward)         Ordered     warfarin (COUMADIN) tablet 5 mg  Daily         08/31/23 1312     IP VTE HIGH RISK PATIENT  Once         08/31/23 1314     Place RAMA hose  Until discontinued         08/31/23 1314     Place RAMA hose  Until discontinued         08/31/23 1311     Place sequential compression device  Until discontinued         08/31/23 1311                    Thank you for your consult. I will follow-up with patient. Please contact us if you have any additional questions.    Meaghan Orr,   Department of Hospital Medicine   [facility] - Orthopedic Periop Services

## 2023-08-31 NOTE — PLAN OF CARE
Problem: Physical Therapy  Goal: Physical Therapy Goal  Description: Short term goals:  Pt will perform supine to sit with contact guard assistance  Pt will perform sit to stand with contact guard assistance  Pt will demonstrate right knee flexion range of motion from 0 to 90 degrees  Pt will ambulate 100 ft with contact guard assistanceusing rolling walker  Pt will ascend/descend 3 steps using no handrail with minimum assistance    Long term goals:  Pt will perform supine to sit with modified independence  Pt will perform sit to stand with modified independence  Pt will ambulate 300 ft with modified independence using rolling walker    Outcome: Ongoing, Progressing

## 2023-08-31 NOTE — TRANSFER OF CARE
"Anesthesia Transfer of Care Note    Patient: Heidy Moyer    Procedure(s) Performed: Procedure(s) (LRB):  ARTHROPLASTY, KNEE, TOTAL, USING COMPUTER-ASSISTED NAVIGATION (Right)    Patient location: PACU    Anesthesia Type: general    Transport from OR: Transported from OR on room air with adequate spontaneous ventilation    Post pain: adequate analgesia    Post assessment: no apparent anesthetic complications    Post vital signs: stable    Level of consciousness: awake and lethargic    Nausea/Vomiting: no nausea/vomiting    Complications: none    Transfer of care protocol was followed      Last vitals:   Visit Vitals  /68   Pulse 83   Temp 36.7 °C (98 °F) (Oral)   Resp 20   Ht 5' 8" (1.727 m)   Wt 117.9 kg (260 lb)   SpO2 (!) 93%   Breastfeeding No   BMI 39.53 kg/m²     "

## 2023-08-31 NOTE — OP NOTE
[facility] - Orthopedic Periop Services  General Surgery  Operative Note    SUMMARY     Date of Procedure: 8/31/2023     Procedure: Procedure(s) (LRB):  ARTHROPLASTY, KNEE, TOTAL, USING COMPUTER-ASSISTED NAVIGATION (Right)       Surgeon(s) and Role:     * William Bravo MD - Primary    Assisting Surgeon: None    Pre-Operative Diagnosis: Right knee pain, unspecified chronicity [M25.561]  Arthritis of right knee [M17.11]    Post-Operative Diagnosis: Post-Op Diagnosis Codes:     * Right knee pain, unspecified chronicity [M25.561]     * Arthritis of right knee [M17.11]    Anesthesia: General    Operative Findings (including complications, if any):         OPERATIVE REPORT    SURGERY DATE:  8/31/2023    PRE-OP DIAGNOSIS:  Right knee pain, unspecified chronicity [M25.561]  Arthritis of right knee [M17.11]    POST-OP DIAGNOSIS:  Post-Op Diagnosis Codes:     * Right knee pain, unspecified chronicity [M25.561]     * Arthritis of right knee [M17.11]    PROCEDURE:  Procedure(s) (LRB):  ARTHROPLASTY, KNEE, TOTAL, USING COMPUTER-ASSISTED NAVIGATION (Right)    SURGEON:  William Bravo M.D.    Assisting Surgeon:      ANESTHESIA:  General    BLOOD LOSS:  100cc    TOURNIQUET TIME:  75min    COMPLICATIONS:  None.    IMPLANTS PLACED:    Implant Name Type Inv. Item Serial No.  Lot No. LRB No. Used Action   CEMENT BONE SURG SMPLX P RADPQ - [id number]  CEMENT BONE SURG SMPLX P RADPQ  XUAN [employer] IRASEMA. [id number] Right 1 Implanted   CEMENT BONE SURG SMPLX P RADPQ - [id number]  CEMENT BONE SURG SMPLX P RADPQ  XUAN [employer] IRASEMA. [id number] Right 1 Implanted   GUIDEPIN 3.20MM 4 [employer] - [id number]  GUIDEPIN 3.20MM 4 [facility]  Right 1 Implanted and Explanted   GUIDEPIN 3.20MM 4 [employer] - [id number]  GUIDEPIN 3.20MM 4 [facility] ([facility])  Right 1 Implanted and Explanted   BASEPLATE TIB EDGARDO PRIM SZ 4 - [id number]  BASEPLATE TIB EDGARDO PRIM SZ 4  [employer] IRASEMA. [id number]  Right 1 Implanted   FEMORAL CEMENTED #4 RIGHT - [id number]  FEMORAL CEMENTED #4 RIGHT  XUAN [employer] IRASEMA. [id number] Right 1 Implanted   PATELLA TRI 32X10 X3 POLYETHYL - [id number]  PATELLA TRI 32X10 X3 POLYETHYL  XUAN [employer] IRASEMA. [id number] Right 1 Implanted   INSERT TIBIAL CS SIZE 4 9MM - [id number]  INSERT TIBIAL CS SIZE 4 9MM  XUAN [employer] IRASEMA. [id number] Right 1 Implanted       INDICATIONS:  Patient is a 62 y.o. year old female with severe degenerative joint disease of the right knee needing total knee arthroplasty.    PROCEDURE IN DETAIL:  After having the risks and benefits of the procedure explained at length to the patient and the patient stating that she understands the risks and benefits of the procedure and wishes to proceed with the procedure, written informed consent was obtained.  The patient was taken to the Operating room and placed on the Operative table at which time General was induced per anesthesia.  The patient then had a sandbag taped on the bed so the knee could be held at 90 degrees of flexion.  A tourniquet was placed over cast padding on the proximal right thigh.  The right lower extremity was prepped and draped in sterile fashion.  It was elevated and exsanguinated with an Esmarch bandage.    At this time a 15 centimeter incision was made centered over the patella going from the tibial tubercle mid-line to approximately 4-5 centimeters superior to the superior pole of the patella.  Skin incision was made with a #10 blade and was taken down to the anterior retinaculum of the patella.  At this point a medial flap was elevated up using the #10 blade.  At this point the knee was flexed up and the quadriceps tendon was identified and a medial arthrotomy starting approximately a centimeter superior to the superior pole of the patella and going over the media border of the patella and the medial border of the patella going down to the tibial tubercle was made using a #10 blade. At this point the joint  opened up and there was noted to be severe DJD of the knee.  The knee was then flexed up.  The remnants of the medial and lateral meniscus were removed, leaving minimal meniscus remaining.  The osteophytes off of the femur were removed using a rongeur.    The ASM navigation block was pinned on to the femur.  The cutting block was then positioned and aligned based on the computer alignment.  It was pinned in place and the distal femoral cut was made. Next the tibial guide was placed at the ASM block pinned to the tibia.  The cutting block was in position based on the computer navigation.  Cutting block pin place and the tibial cut was made.  At this point the knee was noted to be balanced with proper flexion and extension gaps.  The sizing block was then placed onto the distal femur.  It was pinned in place setting the 30 degrees of external rotation.  At this point the cutting block was noted to be a size 4 based off the sizer.  The sizer was removed.  The cutting block was placed.  The anterior cut followed by the anterior chamfer posterior cut followed by the posterior chamfer cut was made.  Trial femur was placed.  Trial tibial tray was placed.  It was a size 4 with a trial 9 millimeter poly.  The knee was noted to be balanced at this point.  The tibia was then prepped placing the tibial plate guide onto the proximal tibia.  It was then punched.  It was then punched comparing the size 4 tibial tray.  Once this was done the distal femur  holes were drilled based off of the trial femoral component.  At this point all trial components were removed.  The patella was resurfaced removing 9 millimeter of bone.  A 32 millimeter all polyethylene patella was inserted and peg holes drilled.  The knee was put through range of motion.  The patella was noted to track appropriately with good alignment and was felt to be stable.  The wounds were irrigated out with copious amounts of normal saline.  The tibia was cemented  first removing excess cement followed by the femur.  The cement was allowed to polymerize and the tibial insert was placed.  The knee was irrigated out with copious amounts of normal saline.  The knee was noted to be tracking in the correct position and alignment with good motion.     At this point two medium Hemovac drains were placed in the joint coming out through the skin.  Figure of eight #1 Vicryl interrupted sutures were used to close the arthrotomy. Subcuticular stitches of 2-0 Vicryl followed by skin staples were used to closed the skin.  A sterile occlusive dressing was placed followed by a Cryo-Cuff.  The patient was then taken from the Operative table and taken to the Recovery Room in good condition.  All counts are correct. There were no complications.         Description of Technical Procedures:     Significant Surgical Tasks Conducted by the Assistant(s), if Applicable:     Estimated Blood Loss (EBL): * No values recorded between 8/31/2023  9:27 AM and 8/31/2023 11:10 AM *100cc           Implants:   Implant Name Type Inv. Item Serial No.  Lot No. LRB No. Used Action   CEMENT BONE SURG SMPLX P RADPQ - [id number]  CEMENT BONE SURG SMPLX P RADPQ  XUAN [employer] IRASEMA. [id number] Right 1 Implanted   CEMENT BONE SURG SMPLX P RADPQ - [id number]  CEMENT BONE SURG SMPLX P RADPQ  XUAN [employer] IRASEMA. [id number] Right 1 Implanted   GUIDEPIN 3.20MM 4 [employer] - [id number]  GUIDEPIN 3.20MM 4 [facility]  Right 1 Implanted and Explanted   GUIDEPIN 3.20MM 4 [employer] - [id number]  GUIDEPIN 3.20MM 4 [facility] ([facility])  Right 1 Implanted and Explanted   BASEPLATE TIB EDGARDO PRIM SZ 4 - [id number]  BASEPLATE TIB EDGARDO PRIM SZ 4  [employer] IRASEMA. [id number] Right 1 Implanted   FEMORAL CEMENTED #4 RIGHT - [id number]  FEMORAL CEMENTED #4 RIGHT  [employer] IRASEMA. [id number] Right 1 Implanted   PATELLA TRI 32X10 X3 POLYETHYL - [id number]  PATELLA TRI 32X10 X3 POLYETHYL  XUAN  [employer]. [id number] Right 1 Implanted   INSERT TIBIAL CS SIZE 4 9MM - [id number]  INSERT TIBIAL CS SIZE 4 9MM  XUAN [employer]. [id number] Right 1 Implanted       Specimens:   Specimen (24h ago, onward)      None                    Condition: Good    Disposition: PACU - hemodynamically stable.    Attestation: I was present and scrubbed for the entire procedure.

## 2023-08-31 NOTE — ANESTHESIA PROCEDURE NOTES
Intubation    Date/Time: 8/31/2023 8:50 AM    Performed by: Joaquín Cuellar CRNA  Authorized by: Damion Martinez MD    Intubation:     Induction:  Intravenous    Intubated:  Postinduction    Mask Ventilation:  Easy mask    Attempts:  1    Attempted By:  CRNA    Difficult Airway Encountered?: No      Complications:  None    Airway Device:  Supraglottic airway/LMA    Airway Device Size:  4.0    Style/Cuff Inflation:  Cuffed (inflated to minimal occlusive pressure)    Placement Verified By:  Capnometry    Complicating Factors:  None    Findings Post-Intubation:  BS equal bilateral

## 2023-08-31 NOTE — PLAN OF CARE
[facility] Periop Services  Initial Discharge Assessment       Primary Care Provider: Nakia Camarillo FNP    Admission Diagnosis: Right knee pain, unspecified chronicity [M25.561]  Arthritis of right knee [M17.11]    Admission Date: 8/31/2023  Expected Discharge Date:     Transition of Care Barriers: Underinsured    Payor: BLUE CROSS [facility] / Plan: [facility] / Product Type: Commercial /     Extended Emergency Contact Information  Primary Emergency Contact: sherman hassan jr  Mobile Phone: [phone]  Relation: Spouse  Preferred language: English   needed? No    Discharge Plan A: Home Health, Home with family  Discharge Plan B: Home with family, Home Health      [facility] [address] [address]  Phone: [phone] Fax: [phone]    The [facility] at [address]  [address] [place] [address]  Phone: [phone] Fax: [phone]      Initial Assessment (most recent)       Adult Discharge Assessment - 08/31/23 1045          Discharge Assessment    Assessment Type Discharge Planning Assessment     Source of Information family     If unable to respond/provide information was family/caregiver contacted? Yes     Contact Name/Number Sherman Hassan (spouse) [phone]     People in Home spouse     Facility Arrived From: home     Do you expect to return to your current living situation? Yes     Do you have help at home or someone to help you manage your care at home? Yes     Who are your caregiver(s) and their phone number(s)? Sherman Bullockd (spouse) [phone]     Prior to hospitilization cognitive status: Unable to Assess     Current cognitive status: Coma/Sedated/Intubated     Home Accessibility not wheelchair accessible     Home Layout Bedroom on 2nd floor;Able to live on 1st floor     Equipment Currently Used at Home walker, rolling     Readmission within 30 days? No      Patient currently being followed by outpatient case management? No     Do you currently have service(s) that help you manage your care at home? No     Do you take prescription medications? Yes     Do you have prescription coverage? No     Do you have any problems affording any of your prescribed medications? No     Who is going to help you get home at discharge? Sherman Moyer (spouse) [phone]     How do you get to doctors appointments? car, drives self;family or friend will provide     Are you on dialysis? No     Do you take coumadin? Yes     Who monitors your labs? Tayo Adkins NP     Discharge Plan discussed with: Spouse/sig other     Name(s) and Number(s) Sherman Moyer (spouse) [phone]     Transition of Care Barriers Underinsured     Discharge Plan A Home Health;Home with family     Discharge Plan B Home with family;Home Health        Physical Activity    On average, how many days per week do you engage in moderate to strenuous exercise (like a brisk walk)? 0 days     On average, how many minutes do you engage in exercise at this level? 0 min        Financial Resource Strain    How hard is it for you to pay for the very basics like food, housing, medical care, and heating? Not hard at all        Housing Stability    In the last 12 months, was there a time when you were not able to pay the mortgage or rent on time? No     In the last 12 months, how many places have you lived? 1     In the last 12 months, was there a time when you did not have a steady place to sleep or slept in a shelter (including now)? No        Transportation Needs    In the past 12 months, has lack of transportation kept you from medical appointments or from getting medications? No     In the past 12 months, has lack of transportation kept you from meetings, work, or from getting things needed for daily living? No        Food Insecurity    Within the past 12 months, you worried that your food would run out before you got the  money to buy more. Never true     Within the past 12 months, the food you bought just didn't last and you didn't have money to get more. Never true        Stress    Do you feel stress - tense, restless, nervous, or anxious, or unable to sleep at night because your mind is troubled all the time - these days? Not at all        Social Connections    In a typical week, how many times do you talk on the phone with family, friends, or neighbors? More than three times a week     How often do you get together with friends or relatives? Three times a week     How often do you attend [employer] or Y[employer] services? More than 4 times per year     Do you belong to any clubs or organizations such as Baptist groups, unions, fraternal or athletic groups, or school groups? No     How often do you attend meetings of the clubs or organizations you belong to? Never     Are you , , , , never , or living with a partner?         Alcohol Use    Q1: How often do you have a drink containing alcohol? Never     Q2: How many drinks containing alcohol do you have on a typical day when you are drinking? Patient does not drink     Q3: How often do you have six or more drinks on one occasion? Never        OTHER    Name(s) of People in Home Sherman Moyer (spouse) [phone]                   SW spoke with Sherman Moyer (spouse) [phone] at bedside, as pt was in OR. Pta, pt lived home with spouse. No hh, has rw. Dcp is to return home with spouse and [facility] home health. Ss following for dc needs and recommendations.

## 2023-08-31 NOTE — ASSESSMENT & PLAN NOTE
Status post right total knee arthoplasty, tolerated well with no immediate complications. PT/OT evaluation pending. Current plan to discharge home with home health. Pain management, weight bearing, disposition per primary.

## 2023-08-31 NOTE — PT/OT/SLP EVAL
Physical Therapy Evaluation     Patient Name: Heidy Moyer   MRN: [id number]  Recent Surgery: Procedure(s) (LRB):  ARTHROPLASTY, KNEE, TOTAL, USING COMPUTER-ASSISTED NAVIGATION (Right) Day of Surgery    Recommendations:     Discharge Recommendations: home, home health PT   Discharge Equipment Recommendations: none   Barriers to discharge: None    Assessment:     Heidy Moyer is a 62 y.o. female admitted with a medical diagnosis of Arthritis of right knee. She presents with the following impairments/functional limitations: impaired functional mobility, gait instability, decreased lower extremity function, pain, decreased ROM. Pt demonstrates good pain control post op. She has needed equipment for home. Complained of some lightheadedness during ambulation but resolved once sitting. Anticipate d/c home tomorrow with family    Rehab Prognosis: Good; patient would benefit from acute PT services to address these deficits and reach maximum level of function.    Plan:     During this hospitalization, patient to be seen BID (5x/wk, daily 2x/wk) to address the above listed problems via gait training, therapeutic activities, therapeutic exercises    Plan of Care Expires: 09/30/23    Subjective     Chief Complaint: right tkr  Patient Comments/Goals: Pt is agreeable to PT   Pain/Comfort:  Pain Rating 1: 3/10  Location - Side 1: Right  Location - Orientation 1: anterior  Location 1: knee  Pain Addressed 1: Pre-medicate for activity  Pain Rating Post-Intervention 1: 3/10    Social History:  Living Environment: Patient lives with their spouse in a single story home with number of outside stair(s): 3  Prior Level of Function: Prior to admission, patient was independent  Equipment Used at Home: none  DME owned (not currently used): rolling walker  Assistance Upon Discharge: significant other    Objective:     Communicated with RN prior to session. Patient found HOB elevated with peripheral IV, blood pressure cuff,  SCD, cryotherapy, knee immobilizer, mendoza catheter, hemovac upon PT entry to room.    General Precautions: Standard, fall   Orthopedic Precautions: RLE weight bearing as tolerated   Braces: Knee immobilizer    Respiratory Status: Room air    Exams:  Cognition: Patient is oriented to Person, Place, Time, Situation  RLE ROM:  in knee immobilizer  RLE Strength:  unable to assess  LLE ROM: WFL  LLE Strength: WFL  Gross Motor Coordination: WFL  Sensation:    -       Intact    Functional Mobility:  Gait belt applied - Yes  Bed Mobility  Scooting: contact guard assistance  Supine to Sit: minimum assistance for LE management  Transfers  Sit to Stand: minimum assistance with rolling walker  Bed to Chair: minimum assistance with rolling walker using Step Transfer  Gait  Patient ambulated 10 ft with rolling walker and contact guard assistance. Patient demonstrates decreased step length, decreased foot clearance, and decreased kam. . All lines remained intact throughout ambulation trail.  Balance  Sitting: contact guard assistance  Standing: contact guard assistance      Therapeutic Activities and Exercises:   Patient educated on role of acute care PT and PT POC, safety while in hospital including calling nurse for mobility, and call light usage      AM-PAC 6 CLICK MOBILITY  Total Score:17    Patient left up in chair with all lines intact and call button in reach.    GOALS:   Multidisciplinary Problems       Physical Therapy Goals          Problem: Physical Therapy    Goal Priority Disciplines Outcome Goal Variances Interventions   Physical Therapy Goal     PT, PT/OT Ongoing, Progressing     Description: Short term goals:  Pt will perform supine to sit with contact guard assistance  Pt will perform sit to stand with contact guard assistance  Pt will demonstrate right knee flexion range of motion from 0 to 90 degrees  Pt will ambulate 100 ft with contact guard assistanceusing rolling walker  Pt will ascend/descend 3 steps  using no handrail with minimum assistance    Long term goals:  Pt will perform supine to sit with modified independence  Pt will perform sit to stand with modified independence  Pt will ambulate 300 ft with modified independence using rolling walker                         History:     Past Medical History:   Diagnosis Date    Atrial fibrillation     Hyperlipidemia     Hypertension        Past Surgical History:   Procedure Laterality Date    BREAST BIOPSY Right      SECTION      x2    CHOLECYSTECTOMY      EYE SURGERY Left     clogged eye duct surgery (bypass)    HYSTERECTOMY      TUBAL LIGATION         Time Tracking:     PT Received On: 23  PT Start Time: 1455  PT Stop Time: 1530  PT Total Time (min): 35 min     Billable Minutes: Evaluation low complexity    2023

## 2023-08-31 NOTE — ASSESSMENT & PLAN NOTE
Patient with Paroxysmal (<7 days) atrial fibrillation which is controlled currently with Beta Blocker and Calcium Channel Blocker. Patient is currently in sinus rhythm.[id number] Score: 1. Anticoagulation indicated. Anticoagulation done with warfarin.    INR 1.3 this AM, no other recent values available. Will resume warfarin with LMWH bridge.

## 2023-08-31 NOTE — HPI
Heidy Moyer is a 67-year-old female with history of HTN, HLD, a-fib, and OA. She is status post right total knee arthoplasty by Dr. Bravo earlier today. Patient tolerated procedure well with no immediate post-operative complications. Medicine consulted for management of chronic conditions.     Patient in no acute distress. Vital signs stable and grossly within normal limits. Pre-operative labs unremarkable. Initial case management assessment completed with plans to discharge home with home health. PT/OT evaluation ordered and pending.

## 2023-09-01 VITALS
RESPIRATION RATE: 18 BRPM | SYSTOLIC BLOOD PRESSURE: 147 MMHG | DIASTOLIC BLOOD PRESSURE: 60 MMHG | BODY MASS INDEX: 39.4 KG/M2 | TEMPERATURE: 97 F | OXYGEN SATURATION: 95 % | HEART RATE: 90 BPM | WEIGHT: 260 LBS | HEIGHT: 68 IN

## 2023-09-01 LAB
ALBUMIN SERPL BCP-MCNC: 3 G/DL (ref 3.5–5)
ALBUMIN/GLOB SERPL: 1 {RATIO}
ALP SERPL-CCNC: 39 U/L (ref 50–130)
ALT SERPL W P-5'-P-CCNC: 27 U/L (ref 13–56)
ANION GAP SERPL CALCULATED.3IONS-SCNC: 10 MMOL/L (ref 7–16)
AST SERPL W P-5'-P-CCNC: 25 U/L (ref 15–37)
BASOPHILS # BLD AUTO: 0.01 K/UL (ref 0–0.2)
BASOPHILS NFR BLD AUTO: 0.1 % (ref 0–1)
BILIRUB SERPL-MCNC: 0.5 MG/DL (ref ?–1.2)
BUN SERPL-MCNC: 17 MG/DL (ref 7–18)
BUN/CREAT SERPL: 17 (ref 6–20)
CALCIUM SERPL-MCNC: 8.4 MG/DL (ref 8.5–10.1)
CHLORIDE SERPL-SCNC: 105 MMOL/L (ref 98–107)
CO2 SERPL-SCNC: 27 MMOL/L (ref 21–32)
CREAT SERPL-MCNC: 1 MG/DL (ref 0.55–1.02)
DIFFERENTIAL METHOD BLD: ABNORMAL
EGFR (NO RACE VARIABLE) (RUSH/TITUS): 63 ML/MIN/1.73M2
EOSINOPHIL # BLD AUTO: 0 K/UL (ref 0–0.5)
EOSINOPHIL NFR BLD AUTO: 0 % (ref 1–4)
ERYTHROCYTE [DISTWIDTH] IN BLOOD BY AUTOMATED COUNT: 14.3 % (ref 11.5–14.5)
GLOBULIN SER-MCNC: 2.9 G/DL (ref 2–4)
GLUCOSE SERPL-MCNC: 171 MG/DL (ref 74–106)
HCT VFR BLD AUTO: 39.1 % (ref 38–47)
HGB BLD-MCNC: 13.3 G/DL (ref 12–16)
IMM GRANULOCYTES # BLD AUTO: 0.03 K/UL (ref 0–0.04)
IMM GRANULOCYTES NFR BLD: 0.3 % (ref 0–0.4)
INR BLD: 1.27
LYMPHOCYTES # BLD AUTO: 0.79 K/UL (ref 1–4.8)
LYMPHOCYTES NFR BLD AUTO: 6.9 % (ref 27–41)
MCH RBC QN AUTO: 32 PG (ref 27–31)
MCHC RBC AUTO-ENTMCNC: 34 G/DL (ref 32–36)
MCV RBC AUTO: 94.2 FL (ref 80–96)
MONOCYTES # BLD AUTO: 0.66 K/UL (ref 0–0.8)
MONOCYTES NFR BLD AUTO: 5.8 % (ref 2–6)
MPC BLD CALC-MCNC: 11.6 FL (ref 9.4–12.4)
NEUTROPHILS # BLD AUTO: 9.94 K/UL (ref 1.8–7.7)
NEUTROPHILS NFR BLD AUTO: 86.9 % (ref 53–65)
NRBC # BLD AUTO: 0 X10E3/UL
NRBC, AUTO (.00): 0 %
PLATELET # BLD AUTO: 263 K/UL (ref 150–400)
POTASSIUM SERPL-SCNC: 4 MMOL/L (ref 3.5–5.1)
PROT SERPL-MCNC: 5.9 G/DL (ref 6.4–8.2)
PROTHROMBIN TIME: 15.7 SECONDS (ref 11.7–14.7)
RBC # BLD AUTO: 4.15 M/UL (ref 4.2–5.4)
SODIUM SERPL-SCNC: 138 MMOL/L (ref 136–145)
WBC # BLD AUTO: 11.43 K/UL (ref 4.5–11)

## 2023-09-01 PROCEDURE — 63600175 PHARM REV CODE 636 W HCPCS: Performed by: FAMILY MEDICINE

## 2023-09-01 PROCEDURE — 85025 COMPLETE CBC W/AUTO DIFF WBC: CPT | Performed by: ORTHOPAEDIC SURGERY

## 2023-09-01 PROCEDURE — 25000003 PHARM REV CODE 250: Performed by: ORTHOPAEDIC SURGERY

## 2023-09-01 PROCEDURE — 63600175 PHARM REV CODE 636 W HCPCS: Performed by: ORTHOPAEDIC SURGERY

## 2023-09-01 PROCEDURE — 99233 SBSQ HOSP IP/OBS HIGH 50: CPT | Mod: ,,, | Performed by: HOSPITALIST

## 2023-09-01 PROCEDURE — 64447 NJX AA&/STRD FEMORAL NRV IMG: CPT | Mod: 59,RT,, | Performed by: ANESTHESIOLOGY

## 2023-09-01 PROCEDURE — 97116 GAIT TRAINING THERAPY: CPT

## 2023-09-01 PROCEDURE — 99233 PR SUBSEQUENT HOSPITAL CARE,LEVL III: ICD-10-PCS | Mod: ,,, | Performed by: HOSPITALIST

## 2023-09-01 PROCEDURE — 99900035 HC TECH TIME PER 15 MIN (STAT)

## 2023-09-01 PROCEDURE — 85610 PROTHROMBIN TIME: CPT | Performed by: ORTHOPAEDIC SURGERY

## 2023-09-01 PROCEDURE — 97110 THERAPEUTIC EXERCISES: CPT

## 2023-09-01 PROCEDURE — 80053 COMPREHEN METABOLIC PANEL: CPT | Performed by: ORTHOPAEDIC SURGERY

## 2023-09-01 PROCEDURE — 25000003 PHARM REV CODE 250: Performed by: INTERNAL MEDICINE

## 2023-09-01 PROCEDURE — 64447 PERIPHERAL BLOCK: ICD-10-PCS | Mod: 59,RT,, | Performed by: ANESTHESIOLOGY

## 2023-09-01 RX ORDER — ROPIVACAINE HYDROCHLORIDE 7.5 MG/ML
INJECTION, SOLUTION EPIDURAL; PERINEURAL
Status: DISCONTINUED | OUTPATIENT
Start: 2023-08-31 | End: 2023-09-01

## 2023-09-01 RX ORDER — HYDROCODONE BITARTRATE AND ACETAMINOPHEN 10; 325 MG/1; MG/1
1 TABLET ORAL EVERY 4 HOURS PRN
Qty: 30 TABLET | Refills: 0 | Status: SHIPPED | OUTPATIENT
Start: 2023-09-01 | End: 2023-09-15

## 2023-09-01 RX ORDER — ACETAMINOPHEN 325 MG/1
650 TABLET ORAL EVERY 6 HOURS PRN
Status: DISCONTINUED | OUTPATIENT
Start: 2023-09-01 | End: 2023-09-01 | Stop reason: HOSPADM

## 2023-09-01 RX ADMIN — HYDROCODONE BITARTRATE AND ACETAMINOPHEN 1 TABLET: 5; 325 TABLET ORAL at 08:09

## 2023-09-01 RX ADMIN — MORPHINE SULFATE 4 MG: 4 INJECTION, SOLUTION INTRAMUSCULAR; INTRAVENOUS at 02:09

## 2023-09-01 RX ADMIN — CETIRIZINE HYDROCHLORIDE 10 MG: 10 TABLET, FILM COATED ORAL at 08:09

## 2023-09-01 RX ADMIN — HYDROCHLOROTHIAZIDE 25 MG: 25 TABLET ORAL at 08:09

## 2023-09-01 RX ADMIN — CEFAZOLIN 2 G: 2 INJECTION, POWDER, FOR SOLUTION INTRAMUSCULAR; INTRAVENOUS at 01:09

## 2023-09-01 RX ADMIN — CARVEDILOL 25 MG: 25 TABLET, FILM COATED ORAL at 08:09

## 2023-09-01 RX ADMIN — ATORVASTATIN CALCIUM 10 MG: 10 TABLET, FILM COATED ORAL at 08:09

## 2023-09-01 RX ADMIN — FENOFIBRATE 145 MG: 145 TABLET ORAL at 08:09

## 2023-09-01 RX ADMIN — ACETAMINOPHEN 650 MG: 325 TABLET ORAL at 01:09

## 2023-09-01 RX ADMIN — DILTIAZEM HYDROCHLORIDE 240 MG: 240 CAPSULE, COATED, EXTENDED RELEASE ORAL at 08:09

## 2023-09-01 RX ADMIN — ENOXAPARIN SODIUM 120 MG: 120 INJECTION SUBCUTANEOUS at 08:09

## 2023-09-01 RX ADMIN — DOCUSATE SODIUM 100 MG: 100 CAPSULE, LIQUID FILLED ORAL at 08:09

## 2023-09-01 RX ADMIN — LOSARTAN POTASSIUM 25 MG: 25 TABLET, FILM COATED ORAL at 08:09

## 2023-09-01 RX ADMIN — SODIUM CHLORIDE: 9 INJECTION, SOLUTION INTRAVENOUS at 05:09

## 2023-09-01 NOTE — ANESTHESIA POSTPROCEDURE EVALUATION
Anesthesia Post Evaluation    Patient: Heidy Moyer    Procedure(s) Performed: Procedure(s) (LRB):  ARTHROPLASTY, KNEE, TOTAL, USING COMPUTER-ASSISTED NAVIGATION (Right)    Final Anesthesia Type: general      Patient location during evaluation: PACU  Patient participation: Yes- Able to Participate  Level of consciousness: awake and alert  Post-procedure vital signs: reviewed and stable  Pain management: adequate  Airway patency: patent  RUDDY mitigation strategies: Multimodal analgesia and Use of major conduction anesthesia (spinal/epidural) or peripheral nerve block  PONV status at discharge: No PONV  Anesthetic complications: no      Cardiovascular status: blood pressure returned to baseline  Respiratory status: unassisted  Hydration status: euvolemic  Follow-up not needed.          Vitals Value Taken Time   /91 08/31/23 1501   Temp 36.7 °C (98 °F) 08/31/23 1144   Pulse 94 08/31/23 1505   Resp 16 08/31/23 1324   SpO2 93 % 08/31/23 1505   Vitals shown include unvalidated device data.      Event Time   Out of Recovery 12:25:00         Pain/Little Score: Pain Rating Prior to Med Admin: 1 (9/1/2023  8:44 AM)  Pain Rating Post Med Admin: 4 (9/1/2023  2:34 AM)  Little Score: 10 (8/31/2023 12:30 PM)

## 2023-09-01 NOTE — PLAN OF CARE
Problem: Occupational Therapy  Goal: Occupational Therapy Goal  Description: STG: (in 1 week)  1.Pt will perform bathing with min(A)  2.Pt will perform UE dressing with independence  3.Pt will perform LE dressing with min(A)  4.Pt will transfer bed/chair/bsc with SBA with RW  5.Pt will perform standing task x 5 min with SBA with RW  6.Tolerate 15 min of tx without fatigue.      LTG: (in 4 weeks)  Restore to max I with selfcare and mobility.     Outcome: Ongoing, Progressing

## 2023-09-01 NOTE — ANESTHESIA PROCEDURE NOTES
Peripheral Block    Patient location during procedure: OR   Block not for primary anesthetic.  Reason for block: at surgeon's request and post-op pain management   Post-op Pain Location: right knee   Start time: 8/31/2023 8:47 AM  Timeout: 8/31/2023 8:47 AM   End time: 8/31/2023 8:47 AM    Staffing  Authorizing Provider: Damion Martinez MD  Performing Provider: Damion Martinez MD    Staffing  Performed by: Damion Martinez MD  Authorized by: Damion Martinez MD    Preanesthetic Checklist  Completed: patient identified, IV checked, site marked, risks and benefits discussed, surgical consent, monitors and equipment checked, pre-op evaluation and timeout performed  Peripheral Block  Patient position: supine  Prep: ChloraPrep  Patient monitoring: heart rate, cardiac monitor, continuous pulse ox, continuous capnometry and frequent blood pressure checks  Block type: adductor canal  Laterality: right  Injection technique: single shot  Needle  Needle type: Stimuplex   Needle gauge: 20 G  Needle length: 4 in  Needle localization: ultrasound guidance   -ultrasound image captured on disc.  Assessment  Injection assessment: negative aspiration, negative parasthesia and local visualized surrounding nerve  Paresthesia pain: none  Heart rate change: no  Slow fractionated injection: yes  Pain Tolerance: comfortable throughout block and no complaints  Medications:    Medications: ROPIvacaine (NAROPIN) injection 0.75% - Perineural   30 mL - 8/31/2023 8:47:00 AM

## 2023-09-01 NOTE — DISCHARGE SUMMARY
[facility] Orthopedic  Orthopedics  Discharge Summary      Patient Name: Heidy Moyer  MRN: [id number]  Admission Date: 8/31/2023  Hospital Length of Stay: 0 days  Discharge Date and Time:  09/01/2023 9:40 AM  Attending Physician: William Bravo MD   Discharging Provider: William Bravo MD  Primary Care Provider: Nakia Camarillo FNP    HPI:   62-year-old female with severe degenerative joint disease of the right knee who is failed non operative treatment including walking with a cane for greater than 3 months now risk for falls needing total knee arthroplasty on the right  Right lower extremity she moves her toes has sensation to touch has palpable pulses tender palpation over her medial joint line and lateral joint line there is some crepitus on motion has range of motion 5-100 degrees of flexion no instability in the knee noted there is an effusion  X-rays show severe degenerative changes tricompartmental right knee  Impression severe degenerative joint disease right knee  Plan total knee arthroplasty on the right with or without patellar resurfacing      Procedure(s) (LRB):  ARTHROPLASTY, KNEE, TOTAL, USING COMPUTER-ASSISTED NAVIGATION (Right)      Hospital Course:  No notes on file patient admitted to the hospital on 08/31/2023 underwent a total knee arthroplasty on the right.  There were no complications.  Postop day 1 patient moves her toes dorsiflexion plantar flexion.  Has sensation to touch.  Has palpable pulses.  At this time she will be discharged home with home health.  DC staples 10-14 days.  She is on Coumadin she will has resumed her Coumadin be on this for DVT prophylaxis taken aspirin a day for the 1st week.  Diet is regular.  Norco for pain.  Follow-up in 2-3 weeks with Dr. Bravo.  No complications during her stay.  Drain was removed on postop day 1.    Goals of Care Treatment Preferences:  Code Status: Full Code      Consults (From admission, onward)        Status  Ordering Provider     Inpatient consult to Hospitalist  Once        Provider:  (Not yet assigned)    Acknowledged OLIVIA HOOKS     IP consult case management/social work  Once        Provider:  (Not yet assigned)    Completed OLIVIA HOOKS          Significant Diagnostic Studies: Labs: All labs within the past 24 hours have been reviewed    Pending Diagnostic Studies:     None        Final Active Diagnoses:    Diagnosis Date Noted POA    PRINCIPAL PROBLEM:  Arthritis of right knee [M17.11] 10/17/2022 Yes    Hypertension [I10]  Yes    Hyperlipidemia [E78.5]  Yes    Atrial fibrillation [I48.91]  Yes      Problems Resolved During this Admission:      Discharged Condition: good    [place] [facility] [place] Up:   Contact information for after-discharge care     Home Medical Care     [facility] .    Service: Home Health Services  Contact information:  [address] [place], [place] [id number]  [phone]                           Patient Instructions:   No discharge procedures on file.  Medications:  Reconciled Home Medications:      Medication List      ASK your doctor about these medications    CALTRATE 600 + D ORAL  Take 1 tablet by mouth 2 (two) times a day.     carvediloL 25 MG tablet  Commonly known as: COREG  Take 1 tablet (25 mg total) by mouth 2 (two) times daily.     cyanocobalamin (vitamin B-12) 5,000 mcg Tbdl  Take 1 tablet by mouth Daily.     diltiaZEM 240 MG 24 hr capsule  Commonly known as: CARDIZEM CD  Take 1 capsule (240 mg total) by mouth once daily.     fenofibrate 145 MG tablet  Commonly known as: TRICOR  Take 1 tablet (145 mg total) by mouth once daily.     hydroCHLOROthiazide 12.5 MG Tab  Commonly known as: HYDRODIURIL  Take 2 tablets (25 mg total) by mouth once daily.     loratadine 10 mg tablet  Commonly known as: CLARITIN  Take 10 mg by mouth once daily.     MAG 64 ORAL  Take 1 tablet by mouth 2 (two) times a day.     olmesartan 40 MG  tablet  Commonly known as: BENICAR  Take 1 tablet (40 mg total) by mouth once daily.     ONE-A-DAY WOMEN'S 50 PLUS ORAL  Take 1 tablet by mouth Daily.     rosuvastatin 20 MG tablet  Commonly known as: CRESTOR  Take 1 tablet (20 mg total) by mouth once daily.     warfarin 5 MG tablet  Commonly known as: COUMADIN  Take 1 tablet (5 mg total) by mouth Daily. Take 1 tab daily and 0.5 tab every Wed and Sun            William Bravo MD  Orthopedics  [facility]

## 2023-09-01 NOTE — PLAN OF CARE
[facility] Orth[facility]  Discharge Final Note    Primary Care Provider: Nakia Camarillo FNP    Expected Discharge Date: 9/1/2023    Final Discharge Note (most recent)       Final Note - 09/01/23 0943          Final Note    Assessment Type Final Discharge Note     Anticipated Discharge Disposition Home-Health Care [facility] [place] [place]     What phone number can be called within the next 1-3 days to see how you are doing after discharge? [phone]        Post-Acute Status    Post-Acute Authorization Home Health     Home Health Status Set-up Complete/Auth obtained     Patient choice form signed by patient/caregiver List with quality metrics by geographic area provided;List from CMS Compare;List from System Post-Acute Care     Discharge Delays None known at this time                      Follow-up providers       William Bravo MD   Specialty: Orthopedic Surgery    [address]  William Bravo Md, Orthopedic & Sports Medicine, [facility] [address]   Phone: [phone]       Next Steps: Follow up in 3 week(s)              After-discharge care                Home Medical Care       [facility]   Service: Home Health Services    [address] [place] [address] C  [place] [address]   Phone: [phone]                             [facility] Pt has rw at home. No further dc needs noted.

## 2023-09-01 NOTE — PT/OT/SLP PROGRESS
Occupational Therapy   Treatment    Name: Heidy Moyer  MRN: [id number]  Admitting Diagnosis:  Arthritis of right knee  1 Day Post-Op    Recommendations:     Discharge Recommendations: home with home health, home health PT  Discharge Equipment Recommendations:  none  Barriers to discharge:       Assessment:     Heidy Moyer is a 62 y.o. female with a medical diagnosis of Arthritis of right knee.  She presents with no new problems. Performance deficits affecting function are impaired balance, decreased lower extremity function, pain, decreased ROM, impaired skin, edema, orthopedic precautions.     Rehab Prognosis:  Good; patient would benefit from acute skilled OT services to address these deficits and reach maximum level of function.       Plan:     Patient to be seen 5 x/week to address the above listed problems via self-care/home management, therapeutic activities, therapeutic exercises  Plan of Care Expires: 09/08/23  Plan of Care Reviewed with: patient, spouse    Subjective     Chief Complaint: Pt had no complaints this date  Patient/Family Comments/goals: return home with family  Pain/Comfort:       Objective:     Communicated with: CLARENCE Rondon  prior to session.  Patient found up in chair with   upon OT entry to room.    General Precautions: Standard, fall    Orthopedic Precautions:RLE weight bearing as tolerated  Braces: Knee immobilizer  Respiratory Status: Room air     Occupational Performance:     Bed Mobility:         Functional Mobility/Transfers:    Functional Mobility: Pt def performing sit>stand d/t having just finished walking with PT    Activities of Daily Living:        [facility] 6 Click ADL:      Treatment & Education:  Pt completed BUE elbow flex and chest press with 2# wt, tricep press and sh IR/ER with green Tband 2x10 ea ex to increase strength for improved I in self care skills.    Patient left up in chair with call button in reach and spouse present    GOALS:   Multidisciplinary  Problems       Occupational Therapy Goals          Problem: Occupational Therapy    Goal Priority Disciplines Outcome Interventions   Occupational Therapy Goal     OT, PT/OT Ongoing, Progressing    Description: STG: (in 1 week)  1.Pt will perform bathing with min(A)  2.Pt will perform UE dressing with independence  3.Pt will perform LE dressing with min(A)  4.Pt will transfer bed/chair/bsc with SBA with RW  5.Pt will perform standing task x 5 min with SBA with RW  6.Tolerate 15 min of tx without fatigue.      LTG: (in 4 weeks)  Restore to max I with selfcare and mobility.                          Time Tracking:     OT Date of Treatment: 09/01/23  OT Start Time: 0935  OT Stop Time: 0950  OT Total Time (min): 15 min    Billable Minutes:Therapeutic Exercise 15               9/1/2023

## 2023-09-01 NOTE — PT/OT/SLP PROGRESS
Physical Therapy Treatment    Patient Name:  Heidy Moyer   MRN:  [id number]    Recommendations:     Discharge Recommendations: home, home health PT  Discharge Equipment Recommendations: none  Barriers to discharge: None    Assessment:     Heidy Moyer is a 62 y.o. female admitted with a medical diagnosis of Arthritis of right knee.  She presents with the following impairments/functional limitations: impaired functional mobility, gait instability, decreased lower extremity function, pain, decreased ROM Pt demonstrates good pain control post op. Had good ROM but more limited with knee extension. Demonstrates good use of rolling walker, should be okay for home.    Rehab Prognosis: Good; patient would benefit from acute skilled PT services to address these deficits and reach maximum level of function.    Recent Surgery: Procedure(s) (LRB):  ARTHROPLASTY, KNEE, TOTAL, USING COMPUTER-ASSISTED NAVIGATION (Right) 1 Day Post-Op    Plan:     During this hospitalization, patient to be seen BID (5x/wk, daily 2x/wk) to address the identified rehab impairments via gait training, therapeutic activities, therapeutic exercises and progress toward the following goals:    Plan of Care Expires:  09/30/23    Subjective     Chief Complaint: right total knee replacement   Patient/Family Comments/goals: Pt is agreeable to PT   Pain/Comfort:  Pain Rating 1: 3/10  Location - Side 1: Right  Location 1: knee  Pain Addressed 1: Reposition  Pain Rating Post-Intervention 1: 5/10      Objective:     Communicated with TENA Bravo MD prior to session.  Patient found HOB elevated with peripheral IV, cryotherapy, knee immobilizer upon PT entry to room.     General Precautions: Standard, fall  Orthopedic Precautions: RLE weight bearing as tolerated  Braces: N/A  Respiratory Status: Room air     Functional Mobility:  Bed Mobility:     Scooting: contact guard assistance  Supine to Sit: contact guard assistance  Transfers:     Sit to  Stand:  contact guard assistance with rolling walker  Bed to Chair: contact guard assistance with  rolling walker  using  Step Transfer  Gait: 100 ft, x2, contact guard assistance with rolling walker, emerging reciprocal pattern, slow kam, antalgic gait  Balance: good  Stairs:  Pt ascended/descended 3 stair(s) with Rolling Walker with left handrail with Contact Guard Assistance.       AM-PAC 6 CLICK MOBILITY  Turning over in bed (including adjusting bedclothes, sheets and blankets)?: 3  Sitting down on and standing up from a chair with arms (e.g., wheelchair, bedside commode, etc.): 4  Moving from lying on back to sitting on the side of the bed?: 4  Moving to and from a bed to a chair (including a wheelchair)?: 3  Need to walk in hospital room?: 3  Climbing 3-5 steps with a railing?: 3  Basic Mobility Total Score: 20       Treatment & Education:  AAROM right knee flexion 3-90 degrees  Pt performed right LE: bed level exercises: ankle pumps, quad sets, and straight leg raise and seated exercises: knee flexion x 30 each  Right knee extension stretch 3x1 minutes   Ambulation with rolling walker, stairs    Patient left up in chair with all lines intact and call button in reach..    GOALS:   Multidisciplinary Problems       Physical Therapy Goals          Problem: Physical Therapy    Goal Priority Disciplines Outcome Goal Variances Interventions   Physical Therapy Goal     PT, PT/OT Ongoing, Progressing     Description: Short term goals:  Pt will perform supine to sit with contact guard assistance  Pt will perform sit to stand with contact guard assistance  Pt will demonstrate right knee flexion range of motion from 0 to 90 degrees  Pt will ambulate 100 ft with contact guard assistanceusing rolling walker  Pt will ascend/descend 3 steps using no handrail with minimum assistance    Long term goals:  Pt will perform supine to sit with modified independence  Pt will perform sit to stand with modified independence  Pt  will ambulate 300 ft with modified independence using rolling walker                         Time Tracking:     PT Received On: 09/01/23  PT Start Time: 0845     PT Stop Time: 0928  PT Total Time (min): 43 min     Billable Minutes: Gait Training 15 and Therapeutic Exercise 28    Treatment Type: Treatment  PT/PTA: PT     Number of PTA visits since last PT visit: 0     09/01/2023

## 2023-09-01 NOTE — NURSING
Patient provided with AVS, Nosin, RAMA hose, Ag Aquacel and 4x4 meplex dressing, incentive spirometer. Patient has a cool jet, states she has a walker at home and she doesn't need a bedside commode.She has received prescription from the pharmacy. Educated patient of discharge teaching, patient verbalizes understanding.

## 2023-09-01 NOTE — PT/OT/SLP EVAL
Occupational Therapy Evaluation     Name: Heidy Moyer  MRN: [id number]  Admitting Diagnosis: Arthritis of right knee Day of Surgery  Recent Surgery: Procedure(s) (LRB):  ARTHROPLASTY, KNEE, TOTAL, USING COMPUTER-ASSISTED NAVIGATION (Right) Day of Surgery    Recommendations:     Discharge Recommendations: home with home health, home health PT  Level of Assistance Recommended: Intermittent assistance  Discharge Equipment Recommendations: none  Barriers to discharge:      Assessment:     Heidy Moyer is a 62 y.o. female with a medical diagnosis of Arthritis of right knee. She presents with performance deficits affecting function including impaired balance, decreased lower extremity function, pain, decreased ROM, impaired skin, edema, orthopedic precautions.     Rehab Prognosis: Good; patient would benefit from acute OT services to address these deficits and reach maximum level of function.    Plan:     Patient to be seen 5 x/week to address the above listed problems via self-care/home management, therapeutic activities, therapeutic exercises  Plan of Care Expires: 09/08/23  Plan of Care Reviewed with: patient, spouse    Subjective     Chief Complaint: post op today (R) TKR  Patient Comments/Goals: return home at D/C  Pain/Comfort:  Pain Rating 1: 3/10  Location - Side 1: Right  Location - Orientation 1: posterior  Location 1: knee  Pain Addressed 1: Pre-medicate for activity, Distraction, Cessation of Activity  Pain Rating Post-Intervention 1: 3/10    Patients cultural, spiritual, Protestant conflicts given the current situation: no    Social History:  Living Environment: Patient lives with their spouse in a 2 story home with number of outside stair(s): 3  Prior Level of Function: Prior to admission, patient was independent.  Roles and Routines: Patient was driving and retired prior to admission.  Equipment Used at Home: none  DME owned (not currently used): rolling walker  Assistance Upon Discharge:  family    Objective:     Communicated with CLARENCE Tamez prior to session. Patient found HOB elevated with peripheral IV, blood pressure cuff, SCD, cryotherapy, hemovac, knee immobilizer, mendoza catheter upon OT entry to room.    General Precautions: Standard, fall   Orthopedic Precautions: RLE weight bearing as tolerated   Braces: Knee immobilizer    Respiratory Status: Room air    Occupational Performance    Gait belt applied - Yes    Bed Mobility:   Supine to sit from right side of bed with minimum assistance    Functional Mobility/Transfers:  Sit <> Stand Transfer with contact guard assistance with rolling walker  Bed <> Chair Transfer using Step Transfer technique with contact guard assistance with rolling walker  Functional Mobility: Pt's ambulation distance was limited by light headedness and dizziness with ambulation    Activities of Daily Living:  Upper Body Dressing: minimum assistance  Lower Body Dressing: total assistance    Cognitive/Visual Perceptual:  Cognitive/Psychosocial Skills:    -     Oriented to: Person, Place, and Situation  -     Follows Commands/attention: Follows two-step commands  -     Communication: clear/fluent  -     Safety awareness/insight to disability: intact  -     Mood/Affect/Coping skills/emotional control: Cooperative    Physical Exam:  Balance:    -     Sitting: independence  -     Standing: contact guard assistance  Dominant hand: Right  Upper Extremity Range of Motion:     -       Right Upper Extremity: WFL  -       Left Upper Extremity: WFL  Upper Extremity Strength:    -       Right Upper Extremity: WFL  -       Left Upper Extremity: WFL   Strength:    -       Right Upper Extremity: WNL  -       Left Upper Extremity: WNL  Fine Motor Coordination:    -       Intact  Gross motor coordination:   WFL    AMPAC 6 Click ADL:  AMPAC Total Score: 17    Treatment & Education:  Educated on the importance of mobility to maximize recovery  Educated on safety with functional mobility;  hand placement to ensure safe transfers to various surfaces in prep for ADLs  Educated on weight bearing status  Will continue to educate as needed      Patient left up in chair with all lines intact, call button in reach, and RN and spouse present.    GOALS:   Multidisciplinary Problems       Occupational Therapy Goals          Problem: Occupational Therapy    Goal Priority Disciplines Outcome Interventions   Occupational Therapy Goal     OT, PT/OT Ongoing, Progressing    Description: STG: (in 1 week)  1.Pt will perform bathing with min(A)  2.Pt will perform UE dressing with independence  3.Pt will perform LE dressing with min(A)  4.Pt will transfer bed/chair/bsc with SBA with RW  5.Pt will perform standing task x 5 min with SBA with RW  6.Tolerate 15 min of tx without fatigue.      LTG: (in 4 weeks)  Restore to max I with selfcare and mobility.                          History:     Past Medical History:   Diagnosis Date    Atrial fibrillation     Hyperlipidemia     Hypertension          Past Surgical History:   Procedure Laterality Date    BREAST BIOPSY Right      SECTION      x2    CHOLECYSTECTOMY      EYE SURGERY Left     clogged eye duct surgery (bypass)    HYSTERECTOMY      TUBAL LIGATION         Time Tracking:     OT Date of Treatment: 23  OT Start Time: 1455  OT Stop Time: 1530  OT Total Time (min): 35 min    Billable Minutes: Evaluation low complexity    2023

## 2023-09-02 PROBLEM — R29.818 SUSPECTED SLEEP APNEA: Status: ACTIVE | Noted: 2023-09-02

## 2023-09-02 PROCEDURE — G0180 MD CERTIFICATION HHA PATIENT: HCPCS | Mod: ,,, | Performed by: ORTHOPAEDIC SURGERY

## 2023-09-02 PROCEDURE — G0180 PR HOME HEALTH MD CERTIFICATION: ICD-10-PCS | Mod: ,,, | Performed by: ORTHOPAEDIC SURGERY

## 2023-09-02 NOTE — SUBJECTIVE & OBJECTIVE
Interval History:     Review of Systems   Constitutional:  Negative for appetite change, fatigue and fever.   HENT:  Negative for congestion, hearing loss and trouble swallowing.    Respiratory:  Negative for chest tightness, shortness of breath and wheezing.    Cardiovascular:  Negative for chest pain and palpitations.   Gastrointestinal:  Negative for abdominal pain, constipation and nausea.   Genitourinary:  Negative for difficulty urinating and dysuria.   Musculoskeletal:  Negative for back pain and neck stiffness.        S/p right knee replacement   Skin:  Negative for pallor and rash.   Neurological:  Negative for dizziness, speech difficulty and headaches.   Psychiatric/Behavioral:  Positive for sleep disturbance. Negative for confusion and suicidal ideas.      Objective:     Vital Signs (Most Recent):  Temp: 97.4 °F (36.3 °C) (09/01/23 0600)  Pulse: 90 (09/01/23 0600)  Resp: 18 (09/01/23 0844)  BP: (!) 147/60 (09/01/23 0600)  SpO2: 95 % (09/01/23 0828) Vital Signs (24h Range):  Temp:  [97 °F (36.1 °C)-97.4 °F (36.3 °C)] 97.4 °F (36.3 °C)  Pulse:  [90-91] 90  Resp:  [18] 18  SpO2:  [95 %] 95 %  BP: (123-147)/(60-75) 147/60     Weight: 117.9 kg (260 lb)  Body mass index is 39.53 kg/m².    Intake/Output Summary (Last 24 hours) at 9/2/2023 0058  Last data filed at 9/1/2023 0420  Gross per 24 hour   Intake --   Output 2050 ml   Net -2050 ml         Physical Exam  Vitals reviewed.   Constitutional:       General: She is awake. She is not in acute distress.     Appearance: She is well-developed. She is morbidly obese. She is not toxic-appearing.   HENT:      Head: Normocephalic.      Nose: Nose normal.      Mouth/Throat:      Pharynx: Oropharynx is clear.   Eyes:      Extraocular Movements: Extraocular movements intact.      Pupils: Pupils are equal, round, and reactive to light.   Neck:      Thyroid: No thyroid mass.      Vascular: No carotid bruit.   Cardiovascular:      Rate and Rhythm: Normal rate and regular  rhythm.      Pulses: Normal pulses.      Heart sounds: Normal heart sounds. No murmur heard.  Pulmonary:      Effort: Pulmonary effort is normal.      Breath sounds: Normal breath sounds and air entry. No wheezing.   Abdominal:      General: Bowel sounds are normal. There is no distension.      Palpations: Abdomen is soft.      Tenderness: There is no abdominal tenderness.   Musculoskeletal:      Cervical back: Neck supple. No rigidity.      Comments: S/p right TKR   Skin:     General: Skin is warm.      Coloration: Skin is not jaundiced.      Findings: No lesion.   Neurological:      General: No focal deficit present.      Mental Status: She is alert and oriented to person, place, and time.      Cranial Nerves: No cranial nerve deficit.   Psychiatric:         Attention and Perception: Attention normal.         Mood and Affect: Mood normal.         Behavior: Behavior normal. Behavior is cooperative.         Thought Content: Thought content normal.         Cognition and Memory: Cognition normal.             Significant Labs: All pertinent labs within the past 24 hours have been reviewed.  BMP:   Recent Labs   Lab 09/01/23  0754   *      K 4.0      CO2 27   BUN 17   CREATININE 1.00   CALCIUM 8.4*     CBC:   Recent Labs   Lab 08/31/23  1224 09/01/23  0753   WBC 6.23 11.43*   HGB 13.2 13.3   HCT 39.2 39.1    263     CMP:   Recent Labs   Lab 08/31/23  1224 09/01/23  0754    138   K 4.5 4.0   * 105   CO2 26 27   * 171*   BUN 20* 17   CREATININE 1.12* 1.00   CALCIUM 8.4* 8.4*   PROT  --  5.9*   ALBUMIN  --  3.0*   BILITOT  --  0.5   ALKPHOS  --  39*   AST  --  25   ALT  --  27   ANIONGAP 10 10       Significant Imaging: I have reviewed all pertinent imaging results/findings within the past 24 hours.

## 2023-09-02 NOTE — PROGRESS NOTES
[facility]  [facility] Medicine  Progress Note    Patient Name: Heidy Moyer  MRN: [id number]  Patient Class: OP- Hospital Outpatient Surgery   Admission Date: 8/31/2023  Length of Stay: 0 days  Attending Physician: No att. providers found  Primary Care Provider: Nakia Camarillo FNP        Subjective:     Principal Problem:Arthritis of right knee        HPI:  Heidy Moyer is a 67-year-old female with history of HTN, HLD, a-fib, and OA. She is status post right total knee arthoplasty by Dr. Bravo earlier today. Patient tolerated procedure well with no immediate post-operative complications. Medicine consulted for management of chronic conditions.     Patient in no acute distress. Vital signs stable and grossly within normal limits. Pre-operative labs unremarkable. Initial case management assessment completed with plans to discharge home with home health. PT/OT evaluation ordered and pending.       Overview/Hospital Course:  09/01 Records reviewed. Pt doing well following right TKR. Plan home with HHPT. Restarted coumadin to follow up next week with her cardiologist at [facility] to recheck INR next week. Hx sleep disturbance longstanding. Told her with sleep issues and hx atrial fib would recommend outpt sleep study. She said she would discuss with her relative who is NP and sasha after getting over surgery. Literature attached to AVS. Past history of HTN, HLD, a-fib, and OA.       No new subjective & objective note has been filed under this hospital service since the last note was generated.      Assessment/Plan:      * Arthritis of right knee  Status post right total knee arthoplasty, tolerated well with no immediate complications. PT/OT evaluation pending. Current plan to discharge home with home health. Pain management, weight bearing, disposition per primary.       Suspected sleep apnea    Recommended outpt sleep study  Education material attacked to AVS    Atrial  fibrillation  Patient with Paroxysmal (<7 days) atrial fibrillation which is controlled currently with Beta Blocker and Calcium Channel Blocker. Patient is currently in sinus rhythm.[id number] Score: 1. Anticoagulation indicated. Anticoagulation done with warfarin.    INR 1.3 this AM, no other recent values available. Will resume warfarin with LMWH bridge.    Hyperlipidemia  Continue statin.      Hypertension  Controlled. Continue home medications and monitor.         VTE Risk Mitigation (From admission, onward)         Ordered     IP VTE HIGH RISK PATIENT  Once         09/01/23 0942                Discharge Planning   ERMA: 9/1/2023     Code Status: Prior   Is the patient medically ready for discharge?:     Reason for patient still in hospital (select all that apply): Laboratory test, Treatment and PT / OT recommendations  Discharge Plan A: Home Health, Home with family   Discharge Delays: None known at this time              Deric Whittaker MD  Department of Hospital Medicine   [facility]

## 2023-09-02 NOTE — PROGRESS NOTES
[facility]  [facility] Medicine  Progress Note    Patient Name: Heidy Moyer  MRN: [id number]  Patient Class: OP- Hospital Outpatient Surgery   Admission Date: 8/31/2023  Length of Stay: 0 days  Attending Physician: No att. providers found  Primary Care Provider: Nakia Camarillo FNP        Subjective:     Principal Problem:Arthritis of right knee        HPI:  Heidy Moyer is a 67-year-old female with history of HTN, HLD, a-fib, and OA. She is status post right total knee arthoplasty by Dr. Bravo earlier today. Patient tolerated procedure well with no immediate post-operative complications. Medicine consulted for management of chronic conditions.     Patient in no acute distress. Vital signs stable and grossly within normal limits. Pre-operative labs unremarkable. Initial case management assessment completed with plans to discharge home with home health. PT/OT evaluation ordered and pending.       Overview/Hospital Course:  09/01 Records reviewed. Pt doing well following right TKR. Plan home with HHPT. Restarted coumadin to follow up next week with her cardiologist at [facility] to recheck INR next week. Hx sleep disturbance longstanding. Told her with sleep issues and hx atrial fib would recommend outpt sleep study. She said she would discuss with her relative who is NP and sasha after getting over surgery. Literature attached to AVS. Past history of HTN, HLD, a-fib, and OA.       Interval History:     Review of Systems   Constitutional:  Negative for appetite change, fatigue and fever.   HENT:  Negative for congestion, hearing loss and trouble swallowing.    Respiratory:  Negative for chest tightness, shortness of breath and wheezing.    Cardiovascular:  Negative for chest pain and palpitations.   Gastrointestinal:  Negative for abdominal pain, constipation and nausea.   Genitourinary:  Negative for difficulty urinating and dysuria.   Musculoskeletal:  Negative for back pain and neck  stiffness.        S/p right knee replacement   Skin:  Negative for pallor and rash.   Neurological:  Negative for dizziness, speech difficulty and headaches.   Psychiatric/Behavioral:  Positive for sleep disturbance. Negative for confusion and suicidal ideas.      Objective:     Vital Signs (Most Recent):  Temp: 97.4 °F (36.3 °C) (09/01/23 0600)  Pulse: 90 (09/01/23 0600)  Resp: 18 (09/01/23 0844)  BP: (!) 147/60 (09/01/23 0600)  SpO2: 95 % (09/01/23 0828) Vital Signs (24h Range):  Temp:  [97 °F (36.1 °C)-97.4 °F (36.3 °C)] 97.4 °F (36.3 °C)  Pulse:  [90-91] 90  Resp:  [18] 18  SpO2:  [95 %] 95 %  BP: (123-147)/(60-75) 147/60     Weight: 117.9 kg (260 lb)  Body mass index is 39.53 kg/m².    Intake/Output Summary (Last 24 hours) at 9/2/2023 0058  Last data filed at 9/1/2023 0420  Gross per 24 hour   Intake --   Output 2050 ml   Net -2050 ml         Physical Exam  Vitals reviewed.   Constitutional:       General: She is awake. She is not in acute distress.     Appearance: She is well-developed. She is morbidly obese. She is not toxic-appearing.   HENT:      Head: Normocephalic.      Nose: Nose normal.      Mouth/Throat:      Pharynx: Oropharynx is clear.   Eyes:      Extraocular Movements: Extraocular movements intact.      Pupils: Pupils are equal, round, and reactive to light.   Neck:      Thyroid: No thyroid mass.      Vascular: No carotid bruit.   Cardiovascular:      Rate and Rhythm: Normal rate and regular rhythm.      Pulses: Normal pulses.      Heart sounds: Normal heart sounds. No murmur heard.  Pulmonary:      Effort: Pulmonary effort is normal.      Breath sounds: Normal breath sounds and air entry. No wheezing.   Abdominal:      General: Bowel sounds are normal. There is no distension.      Palpations: Abdomen is soft.      Tenderness: There is no abdominal tenderness.   Musculoskeletal:      Cervical back: Neck supple. No rigidity.      Comments: S/p right TKR   Skin:     General: Skin is warm.       Coloration: Skin is not jaundiced.      Findings: No lesion.   Neurological:      General: No focal deficit present.      Mental Status: She is alert and oriented to person, place, and time.      Cranial Nerves: No cranial nerve deficit.   Psychiatric:         Attention and Perception: Attention normal.         Mood and Affect: Mood normal.         Behavior: Behavior normal. Behavior is cooperative.         Thought Content: Thought content normal.         Cognition and Memory: Cognition normal.             Significant Labs: All pertinent labs within the past 24 hours have been reviewed.  BMP:   Recent Labs   Lab 09/01/23  0754   *      K 4.0      CO2 27   BUN 17   CREATININE 1.00   CALCIUM 8.4*     CBC:   Recent Labs   Lab 08/31/23  1224 09/01/23  0753   WBC 6.23 11.43*   HGB 13.2 13.3   HCT 39.2 39.1    263     CMP:   Recent Labs   Lab 08/31/23  1224 09/01/23  0754    138   K 4.5 4.0   * 105   CO2 26 27   * 171*   BUN 20* 17   CREATININE 1.12* 1.00   CALCIUM 8.4* 8.4*   PROT  --  5.9*   ALBUMIN  --  3.0*   BILITOT  --  0.5   ALKPHOS  --  39*   AST  --  25   ALT  --  27   ANIONGAP 10 10       Significant Imaging: I have reviewed all pertinent imaging results/findings within the past 24 hours.      Assessment/Plan:      * Arthritis of right knee  Status post right total knee arthoplasty, tolerated well with no immediate complications. PT/OT evaluation pending. Current plan to discharge home with home health. Pain management, weight bearing, disposition per primary.       Suspected sleep apnea    Recommended outpt sleep study  Education material attacked to AVS    Atrial fibrillation  Patient with Paroxysmal (<7 days) atrial fibrillation which is controlled currently with Beta Blocker and Calcium Channel Blocker. Patient is currently in sinus rhythm.[id number] Score: 1. Anticoagulation indicated. Anticoagulation done with warfarin.    INR 1.3 this AM, no other recent values  available. Will resume warfarin with LMWH bridge.    Hyperlipidemia  Continue statin.      Hypertension  Controlled. Continue home medications and monitor.         VTE Risk Mitigation (From admission, onward)         Ordered     IP VTE HIGH RISK PATIENT  Once         09/01/23 0942                Discharge Planning   ERMA: 9/1/2023     Code Status: Prior   Is the patient medically ready for discharge?:     Reason for patient still in hospital (select all that apply): Laboratory test, Treatment and PT / OT recommendations  Discharge Plan A: Home Health, Home with family   Discharge Delays: None known at this time              Deric Whittaker MD  Department of Hospital Medicine   [facility]

## 2023-09-02 NOTE — HOSPITAL COURSE
09/01 Records reviewed. Pt doing well following right TKR. Plan home with HHPT. Restarted coumadin to follow up next week with her cardiologist at [facility] to recheck INR next week. Hx sleep disturbance longstanding. Told her with sleep issues and hx atrial fib would recommend outpt sleep study. She said she would discuss with her relative who is NP and sasha after getting over surgery. Literature attached to AVS. Past history of HTN, HLD, a-fib, and OA.

## 2023-09-14 ENCOUNTER — EXTERNAL HOME HEALTH (OUTPATIENT)
Dept: HOME HEALTH SERVICES | Facility: HOSPITAL | Age: 63
End: 2023-09-14
Payer: COMMERCIAL

## 2023-09-22 DIAGNOSIS — Z96.651 STATUS POST TOTAL KNEE REPLACEMENT, RIGHT: Primary | ICD-10-CM

## 2023-09-25 ENCOUNTER — HOSPITAL ENCOUNTER (OUTPATIENT)
Dept: RADIOLOGY | Facility: HOSPITAL | Age: 63
Discharge: HOME | End: 2023-09-25
Attending: ORTHOPAEDIC SURGERY
Payer: COMMERCIAL

## 2023-09-25 ENCOUNTER — OFFICE VISIT (OUTPATIENT)
Dept: ORTHOPEDICS | Facility: CLINIC | Age: 63
End: 2023-09-25
Payer: COMMERCIAL

## 2023-09-25 VITALS — HEIGHT: 68 IN | BODY MASS INDEX: 39.4 KG/M2 | WEIGHT: 260 LBS

## 2023-09-25 DIAGNOSIS — Z96.651 STATUS POST TOTAL RIGHT KNEE REPLACEMENT: ICD-10-CM

## 2023-09-25 DIAGNOSIS — Z96.651 STATUS POST TOTAL KNEE REPLACEMENT, RIGHT: Primary | ICD-10-CM

## 2023-09-25 DIAGNOSIS — Z96.651 STATUS POST TOTAL KNEE REPLACEMENT, RIGHT: ICD-10-CM

## 2023-09-25 PROCEDURE — 99024 POSTOP FOLLOW-UP VISIT: CPT | Mod: ,,, | Performed by: ORTHOPAEDIC SURGERY

## 2023-09-25 PROCEDURE — 73560 X-RAY EXAM OF KNEE 1 OR 2: CPT | Mod: 26,RT,, | Performed by: ORTHOPAEDIC SURGERY

## 2023-09-25 PROCEDURE — 73560 XR KNEE 1 OR 2 VIEW RIGHT: ICD-10-PCS | Mod: 26,RT,, | Performed by: ORTHOPAEDIC SURGERY

## 2023-09-25 PROCEDURE — 4010F PR ACE/ARB THEARPY RXD/TAKEN: ICD-10-PCS | Mod: ,,, | Performed by: ORTHOPAEDIC SURGERY

## 2023-09-25 PROCEDURE — 99024 PR POST-OP FOLLOW-UP VISIT: ICD-10-PCS | Mod: ,,, | Performed by: ORTHOPAEDIC SURGERY

## 2023-09-25 PROCEDURE — 4010F ACE/ARB THERAPY RXD/TAKEN: CPT | Mod: ,,, | Performed by: ORTHOPAEDIC SURGERY

## 2023-09-25 PROCEDURE — 1159F MED LIST DOCD IN RCRD: CPT | Mod: ,,, | Performed by: ORTHOPAEDIC SURGERY

## 2023-09-25 PROCEDURE — 3008F PR BODY MASS INDEX (BMI) DOCUMENTED: ICD-10-PCS | Mod: ,,, | Performed by: ORTHOPAEDIC SURGERY

## 2023-09-25 PROCEDURE — 99213 OFFICE O/P EST LOW 20 MIN: CPT | Mod: PBBFAC | Performed by: ORTHOPAEDIC SURGERY

## 2023-09-25 PROCEDURE — 1159F PR MEDICATION LIST DOCUMENTED IN MEDICAL RECORD: ICD-10-PCS | Mod: ,,, | Performed by: ORTHOPAEDIC SURGERY

## 2023-09-25 PROCEDURE — 73560 X-RAY EXAM OF KNEE 1 OR 2: CPT | Mod: TC,RT

## 2023-09-25 PROCEDURE — 3008F BODY MASS INDEX DOCD: CPT | Mod: ,,, | Performed by: ORTHOPAEDIC SURGERY

## 2023-09-25 NOTE — PROGRESS NOTES
Two views right knee AP lateral skeletally mature individual total knee arthroplasty in place no loosening fractures or subluxations impression total knee arthroplasty in place right knee no loosening

## 2023-09-25 NOTE — PROGRESS NOTES
Patient is here for follow-up right total knee arthroplasty.  Wounds show no signs of infection.  Staples are out.  She is walking with a walker.  She needs start wearing a for walker.  Continue with therapy.  Has full extension.  Flexion past 100° she is doing well let her start outpatient therapy I will follow back up 6 weeks.  She has some tingling over lateral aspect of the wound.

## 2023-09-26 ENCOUNTER — TELEPHONE (OUTPATIENT)
Dept: ORTHOPEDICS | Facility: CLINIC | Age: 63
End: 2023-09-26
Payer: COMMERCIAL

## 2023-09-26 NOTE — TELEPHONE ENCOUNTER
----- Message from Nakia Kimball sent at 9/26/2023  8:55 AM CDT -----  Pt calling to speak with nurse to see if she needs to be pre med before dental cleaning tomorrow - call back # [phone] (until [phone]) after [phone]

## 2023-09-27 ENCOUNTER — CLINICAL SUPPORT (OUTPATIENT)
Dept: REHABILITATION | Facility: HOSPITAL | Age: 63
End: 2023-09-27
Payer: COMMERCIAL

## 2023-09-27 DIAGNOSIS — Z96.651 STATUS POST TOTAL RIGHT KNEE REPLACEMENT: ICD-10-CM

## 2023-09-27 PROCEDURE — 97110 THERAPEUTIC EXERCISES: CPT

## 2023-09-27 PROCEDURE — 97162 PT EVAL MOD COMPLEX 30 MIN: CPT

## 2023-09-27 PROCEDURE — 97140 MANUAL THERAPY 1/> REGIONS: CPT

## 2023-09-27 NOTE — PROGRESS NOTES
Name: Heidy Moyer  Clinic Number: [id number]     Therapy Diagnosis: No diagnosis found.     Physician: William Bravo MD     Physician Orders: PT Eval and Treat  s/p right TKA  Medical Diagnosis from Referral: s/p right TKA 23  Evaluation Date: 2023  Authorization Period Expiration: to be determined  Plan of Care Expiration: 10/25/23  Progress Note Due: 10/25/23  Date of Surgery: 23  Visit # / Visits authorized: awaiting authorization from payor source  FOTO: 55/ 3     Precautions: blood thinners      Time In: 1107  Time Out: 1200  Total Billable Time: 53 minutes     Subjective      Date of onset: 23     History of current condition - Heidy reports: she was discharged recently from home health and saw Dr Bravo on Monday.  Next appt is in November      Falls: none     Imaging: none:      Prior Therapy: see above home health PT  Social History: lives at home  lives with their spouse, has steps to enter home   Occupation: retired   Prior Level of Function: patient was fully independent , but with increasing knee pain  Current Level of Function: is ambulating without assistive device in home, arrived to clinic using RW            Pain:  Current 0/10, worst 0/10, best 0/10   Location: right knee    Description: Burning  Aggravating Factors:  prolonged walking or standing   Easing Factors:  Tylenol     Patients goals: to return to independent PLOF, able to cut her grass and garden     Medical History:        Past Medical History:   Diagnosis Date    Atrial fibrillation      Hyperlipidemia      Hypertension           Surgical History:   Heidy Moyer  has a past surgical history that includes Hysterectomy; Eye surgery (Left); Cholecystectomy;  section; Tubal ligation; Breast biopsy (Right); and arthroplasty, knee, total, using computer-assisted navigation (Right, 2023).     Medications:   Heidy has a current medication list which includes the following prescription(s):  calcium carbonate/vitamin d3, carvedilol, cyanocobalamin (vitamin b-12), diltiazem, fenofibrate, hydrochlorothiazide, loratadine, magnesium chloride, multivit-minerals/folic acid, olmesartan, rosuvastatin, and warfarin.     Allergies:        Review of patient's allergies indicates:   Allergen Reactions    Lovastatin Other (See Comments)    Pravastatin Other (See Comments)         Objective       Observation: Patient arrived to clinic using RW with slightly decreased right step length and stance time      Posture: wnl        Range of Motion:   Knee Left active Left Passive Right Active R passive   Flexion 120 120 78 80   Extension 0 0 -3                 Lower Extremity Strength  Right LE   Left LE     Knee extension: 3-/5 Knee extension: 4+/5   Knee flexion: 3-/5 Knee flexion: 4/5   Hip flexion: 4/5 Hip flexion: 4/5   Hip extension:  4+/5 Hip extension: 4+/5   Hip abduction: 4+/5 Hip abduction: 4/5   Hip adduction: 4/5 Hip adduction 4+/5   Ankle dorsiflexion: 5/5 Ankle dorsiflexion: 5/5   Ankle plantarflexion: 5/5 Ankle plantarflexion: 5/5                  - Sit <--> Stand:Independent   - Bed Mobility: independent           Joint Mobility: good, minimal boggy end feel  Patellar  .  Instructed on scar massage to proximal incision area of puckering  Palpation: see above     Sensation: wnl        Girth Measurement Joint line 5 cm below 10 cm above   Left 47 cm       Right 49 cm             Intake Outcome Measure for FOTO 55 Survey     Therapist reviewed FOTO scores for Heidy Moyer on 9/27/2023.   FOTO report - see Media section or FOTO account episode details.     Intake Score: 55%            Treatment      Total Treatment time (time-based codes) separate from Evaluation: 35 minutes      Heidy received the treatments listed below:       therapeutic exercises to develop right quadricep and hamstring strength , right knee ROM for 30 minutes including:  AP x 30 reps  QS x 30 reps  TKES x 30 reps  SLR x 20  reps  Heelslides with AP x 20 reps  LAQ x 20- reps  Standing HS curls x 20 reps  Seated / supine HS stretching        manual therapy techniques:  Gentle PROM to right knee into flexion / ext at EOM were applied to the: right knee for 5 minutes     Patient Education and Home Exercises      Education provided:   - HEP     Written Home Exercises Provided: yes. Exercises were reviewed and Heidy was able to demonstrate them prior to the end of the session.  Heidy demonstrated good  understanding of the education provided. See EMR under Patient Instructions for exercises provided during therapy sessions.     Assessment      Heidy is a 62 y.o. female referred to outpatient Physical Therapy with a medical diagnosis of right TKA. Patient presents with minimal right knee pain, decreased AROM/ PROM into flexion and extension, weakness of right quads/ HS, impaired gait     Patient prognosis is Excellent.   Patient will benefit from skilled outpatient Physical Therapy to address the deficits stated above and in the chart below, provide patient /family education, and to maximize patientt's level of independence.      Plan of care discussed with patient: Yes  Patient's spiritual, cultural and educational needs considered and patient is agreeable to the plan of care and goals as stated below:      Anticipated Barriers for therapy: none     Medical Necessity is demonstrated by the following  History  Co-morbidities and personal factors that may impact the plan of care [x] LOW: no personal factors / co-morbidities  [] MODERATE: 1-2 personal factors / co-morbidities  [] HIGH: 3+ personal factors / co-morbidities     Moderate / High Support Documentation:   Co-morbidities affecting plan of care: none     Personal Factors:   no deficits      Examination  Body Structures and Functions, activity limitations and participation restrictions that may impact the plan of care [] LOW: addressing 1-2 elements  [x] MODERATE: 3+ elements  [] HIGH:  4+ elements (please support below)     Moderate / High Support Documentation: impaired AROM, strength, gait       Clinical Presentation [x] LOW: stable  [] MODERATE: Evolving  [] HIGH: Unstable      Decision Making/ Complexity Score: moderate         Goals:  Short Term Goals: 2 weeks   1. Independent with Home Exercise Program   2. Increase Left Knee Range of Motion to 0 Degrees to 120 Degrees  3. Increase Left Knee Strength to grossly 4+/5  4. Patient will ambulate 500 feet with Normal Gait pattern without complaints of pain      Long Term Goals: 6 weeks   1. Patient will increase Left Knee Strength to grossly 5/5  2. Patient will ambulate 1000+ feet with no complaints of pain or weakness in Left Knee        Plan      Plan of care Certification: 9/27/2023 to 10/25/23.     Outpatient Physical Therapy 3 times weekly for 4 weeks to include the following interventions: Gait Training, Therapeutic Exercise, Therapeutic Activity, Neuro-Muscular Rehab,Electrical Stimulation.      Claudia Nuñez PT           Physician's Signature: _________________________________________ Date: ________________

## 2023-09-27 NOTE — PLAN OF CARE
Short Term Goals: 2 weeks   Long Term Goals: 4 weeks   Goals:  Short Term Goals: 2 weeks   1. Independent with Home Exercise Program   2. Increase Left Knee Range of Motion to 0 Degrees to 120 Degrees  3. Increase Left Knee Strength to grossly 4+/5  4. Patient will ambulate 500 feet with Normal Gait pattern without complaints of pain     Long Term Goals: 3 weeks   1. Patient will increase Left Knee Strength to grossly 5/5  2. Patient will ambulate 1000+ feet with no complaints of pain or weakness in Left Knee

## 2023-09-27 NOTE — PLAN OF CARE
Name: Heidy Moyer  Clinic Number: [id number]     Therapy Diagnosis: No diagnosis found.     Physician: William Bravo MD     Physician Orders: PT Eval and Treat  s/p right TKA  Medical Diagnosis from Referral: s/p right TKA 23  Evaluation Date: 2023  Authorization Period Expiration: to be determined  Plan of Care Expiration: 10/25/23  Progress Note Due: 10/25/23  Date of Surgery: 23  Visit # / Visits authorized: awaiting authorization from payor source  FOTO: 55/ 3     Precautions: blood thinners      Time In: 1107  Time Out: 1200  Total Billable Time: 53 minutes     Subjective      Date of onset: 23     History of current condition - Heidy reports: she was discharged recently from home health and saw Dr Bravo on Monday.  Next appt is in November      Falls: none     Imaging: none:      Prior Therapy: see above home health PT  Social History: lives at home  lives with their spouse, has steps to enter home   Occupation: retired   Prior Level of Function: patient was fully independent , but with increasing knee pain  Current Level of Function: is ambulating without assistive device in home, arrived to clinic using RW            Pain:  Current 0/10, worst 0/10, best 0/10   Location: right knee    Description: Burning  Aggravating Factors:  prolonged walking or standing   Easing Factors:  Tylenol     Patients goals: to return to independent PLOF, able to cut her grass and garden     Medical History:        Past Medical History:   Diagnosis Date    Atrial fibrillation      Hyperlipidemia      Hypertension           Surgical History:   Heidy oMyer  has a past surgical history that includes Hysterectomy; Eye surgery (Left); Cholecystectomy;  section; Tubal ligation; Breast biopsy (Right); and arthroplasty, knee, total, using computer-assisted navigation (Right, 2023).     Medications:   Heidy has a current medication list which includes the following prescription(s):  calcium carbonate/vitamin d3, carvedilol, cyanocobalamin (vitamin b-12), diltiazem, fenofibrate, hydrochlorothiazide, loratadine, magnesium chloride, multivit-minerals/folic acid, olmesartan, rosuvastatin, and warfarin.     Allergies:        Review of patient's allergies indicates:   Allergen Reactions    Lovastatin Other (See Comments)    Pravastatin Other (See Comments)         Objective       Observation: Patient arrived to clinic using RW with slightly decreased right step length and stance time      Posture: wnl        Range of Motion:   Knee Left active Left Passive Right Active R passive   Flexion 120 120 78 80   Extension 0 0 -3                 Lower Extremity Strength  Right LE   Left LE     Knee extension: 3-/5 Knee extension: 4+/5   Knee flexion: 3-/5 Knee flexion: 4/5   Hip flexion: 4/5 Hip flexion: 4/5   Hip extension:  4+/5 Hip extension: 4+/5   Hip abduction: 4+/5 Hip abduction: 4/5   Hip adduction: 4/5 Hip adduction 4+/5   Ankle dorsiflexion: 5/5 Ankle dorsiflexion: 5/5   Ankle plantarflexion: 5/5 Ankle plantarflexion: 5/5                  - Sit <--> Stand:Independent   - Bed Mobility: independent           Joint Mobility: good, minimal boggy end feel  Patellar  .  Instructed on scar massage to proximal incision area of puckering  Palpation: see above     Sensation: wnl        Girth Measurement Joint line 5 cm below 10 cm above   Left 47 cm       Right 49 cm             Intake Outcome Measure for FOTO 55 Survey     Therapist reviewed FOTO scores for Heidy Moyer on 9/27/2023.   FOTO report - see Media section or FOTO account episode details.     Intake Score: 55%            Treatment      Total Treatment time (time-based codes) separate from Evaluation: 35 minutes      Heidy received the treatments listed below:       therapeutic exercises to develop right quadricep and hamstring strength , right knee ROM for 30 minutes including:  AP x 30 reps  QS x 30 reps  TKES x 30 reps  SLR x 20  reps  Heelslides with AP x 20 reps  LAQ x 20- reps  Standing HS curls x 20 reps  Seated / supine HS stretching        manual therapy techniques:  Gentle PROM to right knee into flexion / ext at EOM were applied to the: right knee for 5 minutes     Patient Education and Home Exercises      Education provided:   - HEP     Written Home Exercises Provided: yes. Exercises were reviewed and Heidy was able to demonstrate them prior to the end of the session.  Heidy demonstrated good  understanding of the education provided. See EMR under Patient Instructions for exercises provided during therapy sessions.     Assessment      Heidy is a 62 y.o. female referred to outpatient Physical Therapy with a medical diagnosis of right TKA. Patient presents with minimal right knee pain, decreased AROM/ PROM into flexion and extension, weakness of right quads/ HS, impaired gait     Patient prognosis is Excellent.   Patient will benefit from skilled outpatient Physical Therapy to address the deficits stated above and in the chart below, provide patient /family education, and to maximize patientt's level of independence.      Plan of care discussed with patient: Yes  Patient's spiritual, cultural and educational needs considered and patient is agreeable to the plan of care and goals as stated below:      Anticipated Barriers for therapy: none     Medical Necessity is demonstrated by the following  History  Co-morbidities and personal factors that may impact the plan of care [x] LOW: no personal factors / co-morbidities  [] MODERATE: 1-2 personal factors / co-morbidities  [] HIGH: 3+ personal factors / co-morbidities     Moderate / High Support Documentation:   Co-morbidities affecting plan of care: none     Personal Factors:   no deficits      Examination  Body Structures and Functions, activity limitations and participation restrictions that may impact the plan of care [] LOW: addressing 1-2 elements  [x] MODERATE: 3+ elements  [] HIGH:  4+ elements (please support below)     Moderate / High Support Documentation: impaired AROM, strength, gait       Clinical Presentation [x] LOW: stable  [] MODERATE: Evolving  [] HIGH: Unstable      Decision Making/ Complexity Score: moderate         Goals:  Short Term Goals: 2 weeks   1. Independent with Home Exercise Program   2. Increase Left Knee Range of Motion to 0 Degrees to 120 Degrees  3. Increase Left Knee Strength to grossly 4+/5  4. Patient will ambulate 500 feet with Normal Gait pattern without complaints of pain      Long Term Goals: 6 weeks   1. Patient will increase Left Knee Strength to grossly 5/5  2. Patient will ambulate 1000+ feet with no complaints of pain or weakness in Left Knee        Plan      Plan of care Certification: 9/27/2023 to 10/25/23.     Outpatient Physical Therapy 3 times weekly for 4 weeks to include the following interventions: Gait Training, Therapeutic Exercise, Therapeutic Activity, Neuro-Muscular Rehab,Electrical Stimulation.      Claudia Nuñez PT           Physician's Signature: _________________________________________ Date: ________________

## 2023-10-03 ENCOUNTER — CLINICAL SUPPORT (OUTPATIENT)
Dept: REHABILITATION | Facility: HOSPITAL | Age: 63
End: 2023-10-03
Payer: COMMERCIAL

## 2023-10-03 DIAGNOSIS — Z96.651 STATUS POST TOTAL RIGHT KNEE REPLACEMENT: Primary | ICD-10-CM

## 2023-10-03 PROCEDURE — 97112 NEUROMUSCULAR REEDUCATION: CPT | Mod: CQ

## 2023-10-03 PROCEDURE — 97530 THERAPEUTIC ACTIVITIES: CPT | Mod: CQ

## 2023-10-03 PROCEDURE — 97110 THERAPEUTIC EXERCISES: CPT | Mod: CQ

## 2023-10-03 NOTE — PROGRESS NOTES
"[facility]  Physical Therapy Treatment Note    Name: Heidy Moyer  Clinic Number: [id number]    Therapy Diagnosis: No diagnosis found.  Physician: William Bravo MD    Visit Date: 10/3/2023    Physician Orders: PT Eval and Treat  s/p right TKA  Medical Diagnosis from Referral: s/p right TKA 8/31/23  Evaluation Date: 9/27/2023  Authorization Period Expiration: to be determined  Plan of Care Expiration: 10/25/23  Date of Surgery: 8/31/23  Visit # / Visits authorized: 2/15   PTA Visit: 1/5    Time In: 1104  Time Out: 1147  Total Billable Time: 43 minutes    Precautions: Standard and blood thinners    Subjective     Pt reports: her knee is not hurting right now, just stiffness reported.    She was compliant with home exercise program.  Response to previous treatment: first visit since initial evaluation    Pain: 0/10  Location: right knee      Objective     Heidy received therapeutic exercises to develop strength, endurance, ROM, and flexibility for 25 minutes including:    NuStep x 5 min  Calf stretch on slant board: x 2 minutes  Seated hamstring stretch (right): 3 x 20 sec hold  Theraball knee flexion rolls with bilateral knee extension between reps x 3 minutes  Heel slides with green strap for overpressure: x 15 reps x 5 sec hold  Seated heel slides with anterior weight shift and ankle pumps at end range 3 x 30" hold  Long arc quads (right): 2 x 10 reps  Knee flexion stretch on step (right): 3 x 20 sec hold     Heidy received the following manual therapy techniques: were applied to the: right knee for 0 minutes, including:  Not performed    Heidy participated in neuromuscular re-education activities to improve: Balance for 10 minutes. The following activities were included:    Right quad set and straight leg raise for assessment of quad contraction, control, and strength    Quad sets (right): x 20 reps x 3 sec hold  Short arc quads (right): x 20 reps  Straight leg " "raises (right): 2 x 10 reps; verbal and tactile cues for quality of movement and to reduce the extensor lag    Heidy participated in dynamic functional therapeutic activities to improve functional performance for 8 minutes, including:    Chair squats: 2 x 10 reps; verbal cues for equal weight distribution through bilateral lower extremities  2" forward step ups: 2 x 10 reps    Heidy participated in gait training to improve functional mobility and safety for 0 minutes, including:    Not performed    Heidy received the following supervised modalities after being cleared for contradictions: NMES Electrical Stimulation:  Heidy received NMES Electrical Stimulation to elicit muscle contraction of the right quadriceps. Pt received stimulation at a rate of 0 pps with symmetric current, ramp of 2 seconds with 10 second on time and 20 second off time. Patient tolerated treatment well without any adverse effects. (Not performed)    Heidy received cold pack for 0 minutes to right knee.     Right Knee AROM Right Knee PROM   Extension -3 degrees -3 degrees   Flexion (Supine) 80 degrees 80 degrees       Home Exercises Provided and Patient Education Provided     Education provided: Patient educated on the importance of completing skilled physical therapy treatment and home exercise program as prescribed to maximize functional gains.    Written Home Exercises Provided: Patient instructed to cont prior HEP. Exercises were reviewed and Heidy was able to demonstrate them prior to the end of the session.  Heidy demonstrated good  understanding of the education provided.     See EMR under Patient Instructions for exercises provided  during therapy sessions .    Assessment     Patient with good overall effort. Patient able to perform all exercises with minimal complaint of increase in pain. Patient with 1 to 2 degrees quad lag present, will consider NMES electrical stimulation next session. Patient with no complaints post " treatment.    Heidy isprogressing well towards her goals.   Pt prognosis is Excellent.     Pt will continue to benefit from skilled outpatient physical therapy to address the deficits listed in the problem list box on initial evaluation, provide pt/family education, and to maximize pt's level of independence in the home and community environment.     Pt's spiritual, cultural, and educational needs considered and pt agreeable to plan of care and goals.     Anticipated barriers to physical therapy: none    Goals:    Short Term Goals: 2 weeks   1. Independent with Home Exercise Program   2. Increase Left Knee Range of Motion to 0 Degrees to 120 Degrees  3. Increase Left Knee Strength to grossly 4+/5  4. Patient will ambulate 500 feet with Normal Gait pattern without complaints of pain      Long Term Goals: 6 weeks   1. Patient will increase Left Knee Strength to grossly 5/5  2. Patient will ambulate 1000+ feet with no complaints of pain or weakness in Left Knee    Plan     Patient will continue to benefit from skilled physical therapy treatment as prescribed working towards goals listed above to maximize functional potential.       Mane Espinal, PTA  10/3/2023

## 2023-10-05 ENCOUNTER — CLINICAL SUPPORT (OUTPATIENT)
Dept: REHABILITATION | Facility: HOSPITAL | Age: 63
End: 2023-10-05
Payer: COMMERCIAL

## 2023-10-05 DIAGNOSIS — Z96.651 STATUS POST TOTAL RIGHT KNEE REPLACEMENT: Primary | ICD-10-CM

## 2023-10-05 PROCEDURE — 97530 THERAPEUTIC ACTIVITIES: CPT | Mod: CQ

## 2023-10-05 PROCEDURE — 97112 NEUROMUSCULAR REEDUCATION: CPT | Mod: CQ

## 2023-10-05 PROCEDURE — 97110 THERAPEUTIC EXERCISES: CPT | Mod: CQ

## 2023-10-05 NOTE — PROGRESS NOTES
"[facility]  Physical Therapy Treatment Note    Name: Heidy Moyer  Clinic Number: [id number]    Therapy Diagnosis:   Encounter Diagnosis   Name Primary?    Status post total right knee replacement Yes     Physician: William Bravo MD    Visit Date: 10/5/2023    Physician Orders: PT Eval and Treat  s/p right TKA  Medical Diagnosis from Referral: s/p right TKA 8/31/23  Evaluation Date: 9/27/2023  Authorization Period Expiration: to be determined  Plan of Care Expiration: 10/25/23  Date of Surgery: 8/31/23  Visit # / Visits authorized: 3/15   PTA Visit: 2    Time In: 0930  Time Out: 1014  Total Billable Time: 44 minutes    Precautions: Standard and blood thinners    Subjective     Pt reports: her knee is doing about the same, stiff and sore    She was compliant with home exercise program.  Response to previous treatment: first visit since initial evaluation    Pain: 0/10  Location: right knee      Objective     Heidy received therapeutic exercises to develop strength, endurance, ROM, and flexibility for 26 minutes including:    NuStep x 5 min  Calf stretch on slant board: x 2 minutes  Seated hamstring stretch (right): 3 x 20 sec hold  Theraball knee flexion rolls with bilateral knee extension between reps x 4 minutes  Heel slides with green strap for overpressure: x 15 reps x 5 sec hold  Long arc quads (right): 2 x 10 reps  Knee flexion stretch on step (right): 3 x 20 sec hold     Seated heel slides with anterior weight shift and ankle pumps at end range 3 x 30" hold (not performed)    Heidy received the following manual therapy techniques: were applied to the: right knee for 0 minutes, including:  Not performed    Heidy participated in neuromuscular re-education activities to improve: Balance for 10 minutes. The following activities were included:    Right quad set and straight leg raise for assessment of quad contraction, control, and strength    Quad sets (right): x " "20 reps x 3 sec hold  Short arc quads (right): x 20 reps  Straight leg raises (right): 2 x 10 reps; verbal and tactile cues for quality of movement and to reduce the extensor lag    Heidy participated in dynamic functional therapeutic activities to improve functional performance for 8 minutes, including:    Chair squats: x 10 reps; verbal cues for equal weight distribution through bilateral lower extremities  2" forward step ups: 2 x 10 reps    Heidy participated in gait training to improve functional mobility and safety for 0 minutes, including:    Not performed    Heidy received the following supervised modalities after being cleared for contradictions: NMES Electrical Stimulation:  Heidy received NMES Electrical Stimulation to elicit muscle contraction of the right quadriceps. Pt received stimulation at a rate of 0 pps with symmetric current, ramp of 2 seconds with 10 second on time and 20 second off time. Patient tolerated treatment well without any adverse effects. (Not performed)    Heidy received cold pack for 0 minutes to right knee.     Right Knee AROM Right Knee PROM   Extension -3 degrees -3 degrees   Flexion (Supine) 93 degrees 93 degrees       Home Exercises Provided and Patient Education Provided     Education provided: Patient educated on the importance of completing skilled physical therapy treatment and home exercise program as prescribed to maximize functional gains.    Written Home Exercises Provided: Patient instructed to cont prior HEP. Exercises were reviewed and Heidy was able to demonstrate them prior to the end of the session.  Heidy demonstrated good  understanding of the education provided.     See EMR under Patient Instructions for exercises provided  during therapy sessions .    Assessment     Patient with good overall effort. Patient able to perform all exercises with no complaint of increase in pain. Patient with 1 degree quad lag present, Patient with no complaints post " treatment.    Heidy isprogressing well towards her goals.   Pt prognosis is Excellent.     Pt will continue to benefit from skilled outpatient physical therapy to address the deficits listed in the problem list box on initial evaluation, provide pt/family education, and to maximize pt's level of independence in the home and community environment.     Pt's spiritual, cultural, and educational needs considered and pt agreeable to plan of care and goals.     Anticipated barriers to physical therapy: none    Goals:    Short Term Goals: 2 weeks   1. Independent with Home Exercise Program   2. Increase Left Knee Range of Motion to 0 Degrees to 120 Degrees  3. Increase Left Knee Strength to grossly 4+/5  4. Patient will ambulate 500 feet with Normal Gait pattern without complaints of pain      Long Term Goals: 6 weeks   1. Patient will increase Left Knee Strength to grossly 5/5  2. Patient will ambulate 1000+ feet with no complaints of pain or weakness in Left Knee    Plan     Patient will continue to benefit from skilled physical therapy treatment as prescribed working towards goals listed above to maximize functional potential.       Mane Espinal, PTA  10/5/2023

## 2023-10-09 ENCOUNTER — CLINICAL SUPPORT (OUTPATIENT)
Dept: REHABILITATION | Facility: HOSPITAL | Age: 63
End: 2023-10-09
Payer: COMMERCIAL

## 2023-10-09 DIAGNOSIS — Z96.651 STATUS POST TOTAL RIGHT KNEE REPLACEMENT: Primary | ICD-10-CM

## 2023-10-09 PROCEDURE — 97530 THERAPEUTIC ACTIVITIES: CPT

## 2023-10-09 PROCEDURE — 97112 NEUROMUSCULAR REEDUCATION: CPT

## 2023-10-09 PROCEDURE — 97110 THERAPEUTIC EXERCISES: CPT

## 2023-10-09 NOTE — PROGRESS NOTES
"[facility]  Physical Therapy Treatment Note    Name: Heidy Moyer  Clinic Number: [id number]    Therapy Diagnosis:   No diagnosis found.    Physician: William Bravo MD    Visit Date: 10/9/2023    Physician Orders: PT Eval and Treat  s/p right TKA  Medical Diagnosis from Referral: s/p right TKA 8/31/23  Evaluation Date: 9/27/2023  Authorization Period Expiration: to be determined  Plan of Care Expiration: 10/25/23  Date of Surgery: 8/31/23  Visit # / Visits authorized: 4/15   PTA Visit:     Time In: 1100  Time Out:   Total Billable Time: minutes    Precautions: Standard and blood thinners    Subjective     Pt reports: no right knee pain, no falls, no buckling     She was compliant with home exercise program.  Response to previous treatment: first visit since initial evaluation    Pain: 0/10  Location: right knee      Objective     Heidy received therapeutic exercises to develop strength, endurance, ROM, and flexibility for 22 minutes including:    NuStep x 5 min  Calf stretch on slant board: x 2 minutes  Theraball knee flexion rolls with bilateral knee extension between reps x 4 minutes  Heel slides with green strap for overpressure: x 15 reps x 5 sec hold  Long arc quads (right): 2 x 10 reps  Knee flexion stretch on step (right): 3 x 20 sec hold     Seated heel slides with anterior weight shift and ankle pumps at end range 3 x 30" hold (not performed)    Heidy received the following manual therapy techniques: were applied to the: right knee for 0 minutes, including:  Not performed    Heidy participated in neuromuscular re-education activities to improve: Balance for 14 minutes. The following activities were included:    Right quad set and straight leg raise for assessment of quad contraction, control, and strength    Quad sets (right): x 20 reps x 3 sec hold with heel elevated   Short arc quads (right): x 20 reps  Straight leg raises (right): 2 x 10 reps; verbal and " "tactile cues for quality of movement and to reduce the extensor lag    Heidy participated in dynamic functional therapeutic activities to improve functional performance for 8 minutes, including:    Chair squats: x 2 x 10 reps; verbal cues for equal weight distribution through bilateral lower extremities  4" forward step ups: 2 x 10 reps    Heidy participated in gait training to improve functional mobility and safety for 0 minutes, including:    Not performed    Heidy received the following supervised modalities after being cleared for contradictions: NMES Electrical Stimulation:  Heidy received NMES Electrical Stimulation to elicit muscle contraction of the right quadriceps. Pt received stimulation at a rate of 0 pps with symmetric current, ramp of 2 seconds with 10 second on time and 20 second off time. Patient tolerated treatment well without any adverse effects. (Not performed)    Heidy received cold pack for 0 minutes to right knee.     Right Knee AROM Right Knee PROM   Extension -3 degrees -3 degrees   Flexion (Supine) 100 degrees 100 degrees       Home Exercises Provided and Patient Education Provided     Education provided: Patient educated on the importance of completing skilled physical therapy treatment and home exercise program as prescribed to maximize functional gains.    Written Home Exercises Provided: Patient instructed to cont prior HEP. Exercises were reviewed and Heidy was able to demonstrate them prior to the end of the session.  Heidy demonstrated good  understanding of the education provided.     See EMR under Patient Instructions for exercises provided  during therapy sessions .    Assessment     Patient arrived with Active right knee ext at -6, lag with SLR to -8.  This improved to -3 with QS after performing quad sets . Active flexion to 100 after stretching  Progressed to 4 in step for forward step ups today with no pain, just quad fatigue.  Will cont with focus on improving quad strength " and both flexion and end range extension with quad control.     Heidy isprogressing well towards her goals.   Pt prognosis is Excellent.     Pt will continue to benefit from skilled outpatient physical therapy to address the deficits listed in the problem list box on initial evaluation, provide pt/family education, and to maximize pt's level of independence in the home and community environment.     Pt's spiritual, cultural, and educational needs considered and pt agreeable to plan of care and goals.     Anticipated barriers to physical therapy: none    Goals:    Short Term Goals: 2 weeks   1. Independent with Home Exercise Program   2. Increase Left Knee Range of Motion to 0 Degrees to 120 Degrees  3. Increase Left Knee Strength to grossly 4+/5  4. Patient will ambulate 500 feet with Normal Gait pattern without complaints of pain      Long Term Goals: 6 weeks   1. Patient will increase Left Knee Strength to grossly 5/5  2. Patient will ambulate 1000+ feet with no complaints of pain or weakness in Left Knee    Plan     Patient will continue to benefit from skilled physical therapy treatment as prescribed working towards goals listed above to maximize functional potential.       Claudia Nuñez, PT  10/9/2023

## 2023-10-11 ENCOUNTER — CLINICAL SUPPORT (OUTPATIENT)
Dept: REHABILITATION | Facility: HOSPITAL | Age: 63
End: 2023-10-11
Payer: COMMERCIAL

## 2023-10-11 DIAGNOSIS — Z96.651 STATUS POST TOTAL RIGHT KNEE REPLACEMENT: Primary | ICD-10-CM

## 2023-10-11 PROCEDURE — 97112 NEUROMUSCULAR REEDUCATION: CPT

## 2023-10-11 PROCEDURE — 97140 MANUAL THERAPY 1/> REGIONS: CPT

## 2023-10-11 PROCEDURE — 97110 THERAPEUTIC EXERCISES: CPT

## 2023-10-11 NOTE — PROGRESS NOTES
"[facility]  Physical Therapy Treatment Note    Name: Heidy Moyer  Clinic Number: [id number]    Therapy Diagnosis:   Encounter Diagnosis   Name Primary?    Status post total right knee replacement Yes     Physician: William Bravo MD    Visit Date: 10/11/2023    Physician Orders: PT Eval and Treat  s/p right TKA  Medical Diagnosis from Referral: s/p right TKA 8/31/23  Evaluation Date: 9/27/2023  Authorization Period Expiration: to be determined  Plan of Care Expiration: 10/25/23  Date of Surgery: 8/31/23  Visit # / Visits authorized: 5/15   PTA Visit: 0    Time In: 1445  Time Out: 1539  Total Billable Time: 54 minutes    Precautions: Standard and blood thinners    Subjective     Pt reports: She is having intermittent shooting pain on the medial aspect of her left knee.     She was compliant with home exercise program.  Response to previous treatment: first visit since initial evaluation    Pain: 1/10  Location: right knee      Objective     Heidy received therapeutic exercises to develop strength, endurance, ROM, and flexibility for 26 minutes including:    NuStep: x 5 minutes  Calf stretch on slant board: x 2 minutes  Right knee flexion stretch on step: x 10 reps with 10 second holds  Right hamstring stretch on step: 3 x 20 second holds  Long arc quads (right): 2 lbs; 2 x 10 reps  Heel slides (right) with green strap for overpressure: x 10 reps with 5 second holds    Theraball knee flexion rolls with bilateral knee extension between reps x 4 minutes  Seated heel slides with anterior weight shift and ankle pumps at end range 3 x 30" hold (not performed)    Heidy received the following manual therapy techniques: were applied to the: right knee for 10 minutes, including:    Myofascial release to the right posterior thigh and calf with Theraband roller in prone  Prone right quad stretch: 3 x 20 second holds    Heidy participated in neuromuscular re-education activities " "to improve: Balance for 15 minutes. The following activities were included:    Quad sets (right): x 20 reps with 3 second holds with heel elevated   Short arc quads (right): 2 lbs; x 20 reps  Straight leg raises (right): 2 x 10 reps; verbal and tactile cues for quality of movement and to reduce the extensor lag    Heidy participated in dynamic functional therapeutic activities to improve functional performance for 0 minutes, including:    Chair squats: x 2 x 10 reps; verbal cues for equal weight distribution through bilateral lower extremities (not performed)   4" forward step ups: 2 x 10 reps (not performed)     Heidy participated in gait training to improve functional mobility and safety for 3 minutes, including:    Gait in hallway with emphasis on achieving right terminal knee extension    Heidy received the following supervised modalities after being cleared for contradictions: NMES Electrical Stimulation:  Heidy received NMES Electrical Stimulation to elicit muscle contraction of the right quadriceps. Pt received stimulation at a rate of 0 pps with symmetric current, ramp of 2 seconds with 10 second on time and 20 second off time. Patient tolerated treatment well without any adverse effects. (not performed)     Heidy received cold pack for 0 minutes to right knee.     Right Knee AROM Right Knee PROM   Extension -3 degrees -3 degrees   Flexion (Supine) 99 degrees 103 degrees   Flexion (Sitting) 104 degrees 104 degrees     Right quad la degree    Home Exercises Provided and Patient Education Provided     Education provided: Patient educated on the importance of completing skilled physical therapy treatment and home exercise program as prescribed to maximize functional gains.    Written Home Exercises Provided: Patient instructed to cont prior HEP. Exercises were reviewed and Heidy was able to demonstrate them prior to the end of the session.  Heidy demonstrated good  understanding of the education provided. "     See EMR under Patient Instructions for exercises provided  during therapy sessions .    Assessment     Patient with good effort throughout treatment. Patient's right knee flexion and quad lag are both improving. Patient advised to increase her focus on her right knee extension during right stance phase to improve terminal knee extension and gait mechanics. Patient advised to continue applying ice to her right knee in the evenings to decrease inflammation and pain. Patient reported that her right knee felt good post-treatment and that she was no longer having the shooting pain on the medial aspect of her right knee. Physical Therapist will continue to progress therapeutic exercise, neuromuscular re-education, therapeutic activities, and gait training as able with manual therapy and modalities utilized as needed.     Heidy isprogressing well towards her goals.   Pt prognosis is Excellent.     Pt will continue to benefit from skilled outpatient physical therapy to address the deficits listed in the problem list box on initial evaluation, provide pt/family education, and to maximize pt's level of independence in the home and community environment.     Pt's spiritual, cultural, and educational needs considered and pt agreeable to plan of care and goals.     Anticipated barriers to physical therapy: none    Goals:    Short Term Goals: 2 weeks   1. Independent with Home Exercise Program   2. Increase Left Knee Range of Motion to 0 Degrees to 120 Degrees  3. Increase Left Knee Strength to grossly 4+/5  4. Patient will ambulate 500 feet with Normal Gait pattern without complaints of pain      Long Term Goals: 6 weeks   1. Patient will increase Left Knee Strength to grossly 5/5  2. Patient will ambulate 1000+ feet with no complaints of pain or weakness in Left Knee    Plan     Patient will continue to benefit from skilled physical therapy treatment as prescribed working towards goals listed above to maximize functional  potential.       Eric Arboleda, PT, DPT  10/11/2023

## 2023-10-12 ENCOUNTER — HOSPITAL ENCOUNTER (OUTPATIENT)
Dept: RADIOLOGY | Facility: HOSPITAL | Age: 63
Discharge: HOME | End: 2023-10-12
Payer: COMMERCIAL

## 2023-10-12 DIAGNOSIS — Z12.31 VISIT FOR SCREENING MAMMOGRAM: ICD-10-CM

## 2023-10-12 PROCEDURE — 77067 SCR MAMMO BI INCL CAD: CPT | Mod: TC

## 2023-10-12 PROCEDURE — 77067 SCR MAMMO BI INCL CAD: CPT | Mod: 26,,, | Performed by: RADIOLOGY

## 2023-10-12 PROCEDURE — 77067 MAMMO DIGITAL SCREENING BILAT: ICD-10-PCS | Mod: 26,,, | Performed by: RADIOLOGY

## 2023-10-13 ENCOUNTER — CLINICAL SUPPORT (OUTPATIENT)
Dept: REHABILITATION | Facility: HOSPITAL | Age: 63
End: 2023-10-13
Payer: COMMERCIAL

## 2023-10-13 DIAGNOSIS — Z96.651 STATUS POST TOTAL RIGHT KNEE REPLACEMENT: Primary | ICD-10-CM

## 2023-10-13 PROCEDURE — 97110 THERAPEUTIC EXERCISES: CPT | Mod: CQ

## 2023-10-13 PROCEDURE — 97112 NEUROMUSCULAR REEDUCATION: CPT | Mod: CQ

## 2023-10-13 PROCEDURE — 97140 MANUAL THERAPY 1/> REGIONS: CPT | Mod: CQ

## 2023-10-13 NOTE — PROGRESS NOTES
"[facility]  Physical Therapy Treatment Note    Name: Heidy Moyer  Clinic Number: [id number]    Therapy Diagnosis:   Encounter Diagnosis   Name Primary?    Status post total right knee replacement Yes     Physician: William Bravo MD    Visit Date: 10/13/2023    Physician Orders: PT Eval and Treat  s/p right TKA  Medical Diagnosis from Referral: s/p right TKA 8/31/23  Evaluation Date: 9/27/2023  Authorization Period Expiration: to be determined  Plan of Care Expiration: 10/25/23  Date of Surgery: 8/31/23  Visit # / Visits authorized: 6/15   PTA Visit: 1    Time In: 0931  Time Out: 1014  Total Billable Time: 43 minutes    Precautions: Standard and blood thinners    Subjective     Pt reports: her knee is stiff, stating "I feel like I have rigor mortis in my leg this morning haha".    She was compliant with home exercise program.  Response to previous treatment: first visit since initial evaluation    Pain: 0/10  Location: right knee      Objective     Heidy received therapeutic exercises to develop strength, endurance, ROM, and flexibility for 20 minutes including:    NuStep: x 5 minutes  Calf stretch on slant board: x 2 minutes  Right knee flexion stretch on step: x 10 reps with 10 second holds  Right hamstring stretch on step: 3 x 20 second holds  Long arc quads (right): 2 lbs; 2 x 10 reps  Heel slides (right) with green strap for overpressure: x 10 reps with 5 second holds    Theraball knee flexion rolls with bilateral knee extension between reps x 4 minutes  Seated heel slides with anterior weight shift and ankle pumps at end range 3 x 30" hold (not performed)    Heidy received the following manual therapy techniques: were applied to the: right knee for 10 minutes, including:    Myofascial release to the right posterior thigh and calf with Theraband roller in prone  Prone right quad stretch: 3 x 20 second holds    Heidy participated in neuromuscular re-education " "activities to improve: Balance for 13 minutes. The following activities were included:    Quad sets (right): x 20 reps with 3 second holds with heel elevated   Short arc quads (right): 2 lbs; x 20 reps  Straight leg raises (right): 2 x 10 reps; verbal and tactile cues for quality of movement and to reduce the extensor lag    Heiyd participated in dynamic functional therapeutic activities to improve functional performance for 0 minutes, including:    Chair squats: x 2 x 10 reps; verbal cues for equal weight distribution through bilateral lower extremities (not performed)   4" forward step ups: 2 x 10 reps (not performed)     Heidy participated in gait training to improve functional mobility and safety for 0 minutes, including:    Gait in hallway with emphasis on achieving right terminal knee extension    Heidy received the following supervised modalities after being cleared for contradictions: NMES Electrical Stimulation:  Heidy received NMES Electrical Stimulation to elicit muscle contraction of the right quadriceps. Pt received stimulation at a rate of 0 pps with symmetric current, ramp of 2 seconds with 10 second on time and 20 second off time. Patient tolerated treatment well without any adverse effects. (not performed)     Heidy received cold pack for 0 minutes to right knee.     Right Knee AROM Right Knee PROM   Extension -3 degrees -3 degrees   Flexion (Supine) 99 degrees 103 degrees   Flexion (Sitting) 104 degrees 104 degrees     Right quad la degree    Home Exercises Provided and Patient Education Provided     Education provided: Patient educated on the importance of completing skilled physical therapy treatment and home exercise program as prescribed to maximize functional gains.    Written Home Exercises Provided: Patient instructed to cont prior HEP. Exercises were reviewed and Heidy was able to demonstrate them prior to the end of the session.  Heidy demonstrated good  understanding of the education " provided.     See EMR under Patient Instructions for exercises provided  during therapy sessions .    Assessment     Patient with good effort throughout treatment.  Patient reported that her right knee felt good post-treatment. Patient stated that she was no longer having the shooting pain on the medial aspect of her right knee since last session. Physical Therapist Assistant will continue to progress therapeutic exercise, neuromuscular re-education, therapeutic activities, and gait training as able with manual therapy and modalities utilized as needed.     Heidy isprogressing well towards her goals.   Pt prognosis is Excellent.     Pt will continue to benefit from skilled outpatient physical therapy to address the deficits listed in the problem list box on initial evaluation, provide pt/family education, and to maximize pt's level of independence in the home and community environment.     Pt's spiritual, cultural, and educational needs considered and pt agreeable to plan of care and goals.     Anticipated barriers to physical therapy: none    Goals:    Short Term Goals: 2 weeks   1. Independent with Home Exercise Program   2. Increase Left Knee Range of Motion to 0 Degrees to 120 Degrees  3. Increase Left Knee Strength to grossly 4+/5  4. Patient will ambulate 500 feet with Normal Gait pattern without complaints of pain      Long Term Goals: 6 weeks   1. Patient will increase Left Knee Strength to grossly 5/5  2. Patient will ambulate 1000+ feet with no complaints of pain or weakness in Left Knee    Plan     Patient will continue to benefit from skilled physical therapy treatment as prescribed working towards goals listed above to maximize functional potential.       SEYMOUR Ingram  10/13/2023

## 2023-10-16 ENCOUNTER — CLINICAL SUPPORT (OUTPATIENT)
Dept: REHABILITATION | Facility: HOSPITAL | Age: 63
End: 2023-10-16
Payer: COMMERCIAL

## 2023-10-16 DIAGNOSIS — Z96.651 STATUS POST TOTAL RIGHT KNEE REPLACEMENT: Primary | ICD-10-CM

## 2023-10-16 PROCEDURE — 97110 THERAPEUTIC EXERCISES: CPT

## 2023-10-16 PROCEDURE — 97112 NEUROMUSCULAR REEDUCATION: CPT

## 2023-10-16 PROCEDURE — 97530 THERAPEUTIC ACTIVITIES: CPT

## 2023-10-16 NOTE — PROGRESS NOTES
[facility]  Physical Therapy Treatment Note    Name: Heidy Moyer  Clinic Number: [id number]    Therapy Diagnosis:   No diagnosis found.    Physician: William Bravo MD    Visit Date: 10/16/2023    Physician Orders: PT Eval and Treat  s/p right TKA  Medical Diagnosis from Referral: s/p right TKA 8/31/23  Evaluation Date: 9/27/2023  Authorization Period Expiration: to be determined  Plan of Care Expiration: 10/25/23  Date of Surgery: 8/31/23  Visit # / Visits authorized: 7/15   PTA Visit: 0    Time In:841  Time Out:929  Total Billable Time:    48 minutes    Precautions: Standard and blood thinners    Subjective    Patient reports she had a stomach virus this weekend but her knee is feeling ok  Pt reports: She was compliant with home exercise program.  Response to previous treatment: first visit since initial evaluation    Pain: 0/10  Location: right knee      Objective     Heidy received therapeutic exercises to develop strength, endurance, ROM, and flexibility for 20 minutes including:    NuStep: x 5 minutes  Calf stretch on slant board: x 2 minutes  Right knee flexion stretch on step: x 10 reps with 10 second holds  Right hamstring stretch on step: 3 x 20 second holds  Long arc quads (right): 2 lbs; 2 x 10 reps  Heel slides (right) with green strap for overpressure: x 10 reps with 5 second holds  Theraball knee flexion rolls with bilateral knee extension between reps x 4 minutes    Heidy received the following manual therapy techniques: were applied to the: right knee for 10 minutes, including:    MFR to right hamstrings in prone  Prone right quad stretch: 3 x 20 second holds    Heidy participated in neuromuscular re-education activities to improve: Balance for 13 minutes. The following activities were included:    Quad sets (right): x 20 reps with 3 second holds with heel elevated   Short arc quads (right): 2 lbs; x 20 reps  Straight leg raises (right): 2 x 10  "reps; verbal and tactile cues for quality of movement and to reduce the extensor lag    Heidy participated in dynamic functional therapeutic activities to improve functional performance for 0 minutes, including:    Chair squats: x 2 x 10 reps; verbal cues for equal weight distribution through bilateral lower extremities   4" forward step ups: 2 x 10 reps with min vc for eccentric contraction    Heidy participated in gait training to improve functional mobility and safety for 0 minutes, including:    Gait in hallway with emphasis on achieving right terminal knee extension    Heidy received the following supervised modalities after being cleared for contradictions: NMES Electrical Stimulation:  Heidy received NMES Electrical Stimulation to elicit muscle contraction of the right quadriceps. Pt received stimulation at a rate of 0 pps with symmetric current, ramp of 2 seconds with 10 second on time and 20 second off time. Patient tolerated treatment well without any adverse effects. (not performed)     Heidy received cold pack for 0 minutes to right knee.     Right Knee AROM Right Knee PROM   Extension -1 degrees -3 degrees   Flexion (Supine) 96 degrees 103 degrees   Flexion (Sitting) 106  degrees 104 degrees     Right quad la degree    Home Exercises Provided and Patient Education Provided     Education provided: Patient educated on the importance of completing skilled physical therapy treatment and home exercise program as prescribed to maximize functional gains.    Written Home Exercises Provided: Patient instructed to cont prior HEP. Exercises were reviewed and Heidy was able to demonstrate them prior to the end of the session.  Heidy demonstrated good  understanding of the education provided.     See EMR under Patient Instructions for exercises provided  during therapy sessions .    Assessment     Patient arrived with AROM of right knee to -1  to 96 degrees in supine.  Continued with ex focusing on increasing " quad strength and knee ROM with patient improving with knee extension from -3 to -1 today and improving quad control in step ups and chair squats today.  Will cont with current POC.    Heidy isprogressing well towards her goals.   Pt prognosis is Excellent.     Pt will continue to benefit from skilled outpatient physical therapy to address the deficits listed in the problem list box on initial evaluation, provide pt/family education, and to maximize pt's level of independence in the home and community environment.     Pt's spiritual, cultural, and educational needs considered and pt agreeable to plan of care and goals.     Anticipated barriers to physical therapy: none    Goals:    Short Term Goals: 2 weeks   1. Independent with Home Exercise Program   2. Increase Left Knee Range of Motion to 0 Degrees to 120 Degrees  3. Increase Left Knee Strength to grossly 4+/5  4. Patient will ambulate 500 feet with Normal Gait pattern without complaints of pain      Long Term Goals: 6 weeks   1. Patient will increase Left Knee Strength to grossly 5/5  2. Patient will ambulate 1000+ feet with no complaints of pain or weakness in Left Knee    Plan     Patient will continue to benefit from skilled physical therapy treatment as prescribed working towards goals listed above to maximize functional potential.       SEYMOUR Ingram  10/16/2023

## 2023-10-18 ENCOUNTER — CLINICAL SUPPORT (OUTPATIENT)
Dept: REHABILITATION | Facility: HOSPITAL | Age: 63
End: 2023-10-18
Payer: COMMERCIAL

## 2023-10-18 DIAGNOSIS — Z96.651 STATUS POST TOTAL RIGHT KNEE REPLACEMENT: Primary | ICD-10-CM

## 2023-10-18 PROCEDURE — 97140 MANUAL THERAPY 1/> REGIONS: CPT | Mod: CQ

## 2023-10-18 PROCEDURE — 97112 NEUROMUSCULAR REEDUCATION: CPT | Mod: CQ

## 2023-10-18 PROCEDURE — 97110 THERAPEUTIC EXERCISES: CPT | Mod: CQ

## 2023-10-18 NOTE — PROGRESS NOTES
[facility]  Physical Therapy Treatment Note    Name: Heidy Moyer  Clinic Number: [id number]    Therapy Diagnosis:   Encounter Diagnosis   Name Primary?    Status post total right knee replacement Yes       Physician: William Bravo MD    Visit Date: 10/18/2023    Physician Orders: PT Eval and Treat  s/p right TKA  Medical Diagnosis from Referral: s/p right TKA 8/31/23  Evaluation Date: 9/27/2023  Authorization Period Expiration:09/24/2024  Plan of Care Expiration: 10/25/23  Date of Surgery: 8/31/23  Visit # / Visits authorized: 8/15   PTA Visit: 1    Time In: 1227  Time Out: 1312  Total Billable Time: 45 minutes    Precautions: Standard and blood thinners    Subjective     Patient reports: she is doing well with no pain; patient states she has been  She was compliant with home exercise program.  Response to previous treatment: first visit since initial evaluation    Pain: 0/10  Location: right knee      Objective     Heidy received therapeutic exercises to develop strength, endurance, ROM, and flexibility for 20 minutes including:    NuStep: x 5 minutes  Calf stretch on slant board: x 2 minutes  Right knee flexion stretch on step: x 10 reps with 10 second holds  Right hamstring stretch on step: 3 x 20 second holds  Long arc quads (right): 2 lbs; 2 x 10 reps  Heel slides (right) with green strap for overpressure: x 10 reps with 5 second holds    Theraball knee flexion rolls with bilateral knee extension between reps x 4 minutes (not performed)    Heidy received the following manual therapy techniques: were applied to the: right knee for 10 minutes, including:    MFR to right hamstrings in prone  Prone right quad stretch: 3 x 20 second holds    Heidy participated in neuromuscular re-education activities to improve: Balance for 15 minutes. The following activities were included:    Quad sets (right): x 20 reps with 3 second holds with heel elevated   Short arc quads  "(right): 2 lbs; x 20 reps  Straight leg raises (right): 2 x 10 reps; verbal and tactile cues for quality of movement and to reduce the extensor lag  Prone knee extensions (right): 2 x 10 reps    Heidy participated in dynamic functional therapeutic activities to improve functional performance for 0 minutes, including:    Chair squats: x 2 x 10 reps; verbal cues for equal weight distribution through bilateral lower extremities   4" forward step ups: 2 x 10 reps with min vc for eccentric contraction  4" lateral lowers (right): x 15 reps    Heidy participated in gait training to improve functional mobility and safety for 0 minutes, including:    Gait in hallway with emphasis on achieving right terminal knee extension    Heidy received the following supervised modalities after being cleared for contradictions: NMES Electrical Stimulation:  Heidy received NMES Electrical Stimulation to elicit muscle contraction of the right quadriceps. Pt received stimulation at a rate of 0 pps with symmetric current, ramp of 2 seconds with 10 second on time and 20 second off time. Patient tolerated treatment well without any adverse effects. (not performed)     Heidy received cold pack for 0 minutes to right knee.     Right Knee AROM Right Knee PROM   Extension -1 degrees -3 degrees   Flexion (Supine) 100 degrees 103 degrees   Flexion (Sitting) 106  degrees 104 degrees     Right quad lag: .5 degree    Home Exercises Provided and Patient Education Provided     Education provided: Patient educated on the importance of completing skilled physical therapy treatment and home exercise program as prescribed to maximize functional gains.    Written Home Exercises Provided: Patient instructed to cont prior HEP. Exercises were reviewed and Heidy was able to demonstrate them prior to the end of the session.  Heidy demonstrated good  understanding of the education provided.     See EMR under Patient Instructions for exercises provided  during " therapy sessions .    Assessment      Patient with good effort overall. Patient able to perform all exercises with no complaints. Patient with improved AROM of right knee to -1  to 100 degrees in supine. Patient with no complaints post treatment. Will cont with current POC.    Heidy isprogressing well towards her goals.   Pt prognosis is Excellent.     Pt will continue to benefit from skilled outpatient physical therapy to address the deficits listed in the problem list box on initial evaluation, provide pt/family education, and to maximize pt's level of independence in the home and community environment.     Pt's spiritual, cultural, and educational needs considered and pt agreeable to plan of care and goals.     Anticipated barriers to physical therapy: none    Goals:    Short Term Goals: 2 weeks   1. Independent with Home Exercise Program   2. Increase Left Knee Range of Motion to 0 Degrees to 120 Degrees  3. Increase Left Knee Strength to grossly 4+/5  4. Patient will ambulate 500 feet with Normal Gait pattern without complaints of pain      Long Term Goals: 6 weeks   1. Patient will increase Left Knee Strength to grossly 5/5  2. Patient will ambulate 1000+ feet with no complaints of pain or weakness in Left Knee    Plan     Patient will continue to benefit from skilled physical therapy treatment as prescribed working towards goals listed above to maximize functional potential.       SEYMOUR Ingram  10/18/2023

## 2023-10-20 ENCOUNTER — CLINICAL SUPPORT (OUTPATIENT)
Dept: REHABILITATION | Facility: HOSPITAL | Age: 63
End: 2023-10-20
Payer: COMMERCIAL

## 2023-10-20 DIAGNOSIS — Z96.651 STATUS POST TOTAL RIGHT KNEE REPLACEMENT: Primary | ICD-10-CM

## 2023-10-20 PROCEDURE — 97112 NEUROMUSCULAR REEDUCATION: CPT | Mod: CQ

## 2023-10-20 PROCEDURE — 97110 THERAPEUTIC EXERCISES: CPT | Mod: CQ

## 2023-10-20 PROCEDURE — 97530 THERAPEUTIC ACTIVITIES: CPT | Mod: CQ

## 2023-10-20 NOTE — PROGRESS NOTES
[facility]  Physical Therapy Treatment Note    Name: Heidy Moyer  Clinic Number: [id number]    Therapy Diagnosis:   Encounter Diagnosis   Name Primary?    Status post total right knee replacement Yes       Physician: William Bravo MD    Visit Date: 10/20/2023    Physician Orders: PT Eval and Treat  s/p right TKA  Medical Diagnosis from Referral: s/p right TKA 8/31/23  Evaluation Date: 9/27/2023  Authorization Period Expiration:09/24/2024  Plan of Care Expiration: 10/25/23  Date of Surgery: 8/31/23  Visit # / Visits authorized: 9/15  PTA Visit: 2    Time In: 1009  Time Out: 1104  Total Billable Time: 55 minutes    Precautions: Standard and blood thinners    Subjective     Patient reports: her knee is doing well with no pain reported  She was compliant with home exercise program    Pain: 0/10  Location: right knee      Objective     Heidy received therapeutic exercises to develop strength, endurance, ROM, and flexibility for 23 minutes including:    NuStep: x 5 minutes  Calf stretch on slant board: x 2 minutes  Right knee flexion stretch on step: x 10 reps with 10 second holds  Right hamstring stretch on step: 3 x 20 second holds  Long arc quads (right): 2 lbs; 2 x 10 reps  Heel slides (right) with green strap for overpressure: x 10 reps with 5 second holds    Theraball knee flexion rolls with bilateral knee extension between reps x 4 minutes (not performed)    Heidy received the following manual therapy techniques: were applied to the: right knee for 5 minutes, including:    MFR to right hamstrings in prone  Prone right quad stretch: 3 x 20 second holds    Heidy participated in neuromuscular re-education activities to improve: Balance for 17 minutes. The following activities were included:    Quad sets (right): x 20 reps with 3 second holds with heel elevated   Short arc quads (right): 2 lbs; x 20 reps  Straight leg raises (right): 2 x 10 reps; verbal and tactile  "cues for quality of movement and to reduce the extensor lag  Prone knee extensions (right): 2 x 10 reps    Heidy participated in dynamic functional therapeutic activities to improve functional performance for 10 minutes, including:    Chair squats: x 2 x 10 reps; verbal cues for equal weight distribution through bilateral lower extremities   4" lateral lowers (right): x 15 reps    4" forward step ups: 2 x 10 reps with min vc for eccentric contraction (not performed)    Heidy participated in gait training to improve functional mobility and safety for 0 minutes, including:    Gait in hallway with emphasis on achieving right terminal knee extension    Heidy received the following supervised modalities after being cleared for contradictions: NMES Electrical Stimulation:  Heidy received NMES Electrical Stimulation to elicit muscle contraction of the right quadriceps. Pt received stimulation at a rate of 0 pps with symmetric current, ramp of 2 seconds with 10 second on time and 20 second off time. Patient tolerated treatment well without any adverse effects. (not performed)     Heidy received cold pack for 0 minutes to right knee.     Right Knee AROM Right Knee PROM   Extension -1 degrees -3 degrees   Flexion (Supine) 100 degrees 103 degrees   Flexion (Sitting) 106  degrees 104 degrees     Right quad lag: .5 degree    Home Exercises Provided and Patient Education Provided     Education provided: Patient educated on the importance of completing skilled physical therapy treatment and home exercise program as prescribed to maximize functional gains.    Written Home Exercises Provided: Patient instructed to cont prior HEP. Exercises were reviewed and Heidy was able to demonstrate them prior to the end of the session.  Heidy demonstrated good  understanding of the education provided.     See EMR under Patient Instructions for exercises provided  during therapy sessions .    Assessment      Patient with good effort overall. " Patient able to perform all exercises with no complaints of increase in pain. Patient with improving knee range of motion, but still limited requiring continued skilled interventions.    Heidy isprogressing well towards her goals.   Pt prognosis is Excellent.     Pt will continue to benefit from skilled outpatient physical therapy to address the deficits listed in the problem list box on initial evaluation, provide pt/family education, and to maximize pt's level of independence in the home and community environment.     Pt's spiritual, cultural, and educational needs considered and pt agreeable to plan of care and goals.     Anticipated barriers to physical therapy: none    Goals:    Short Term Goals: 2 weeks   1. Independent with Home Exercise Program   2. Increase Left Knee Range of Motion to 0 Degrees to 120 Degrees  3. Increase Left Knee Strength to grossly 4+/5  4. Patient will ambulate 500 feet with Normal Gait pattern without complaints of pain      Long Term Goals: 6 weeks   1. Patient will increase Left Knee Strength to grossly 5/5  2. Patient will ambulate 1000+ feet with no complaints of pain or weakness in Left Knee    Plan     Patient will continue to benefit from skilled physical therapy treatment as prescribed working towards goals listed above to maximize functional potential.       SEYMOUR Ingram  10/20/2023

## 2023-10-23 ENCOUNTER — CLINICAL SUPPORT (OUTPATIENT)
Dept: REHABILITATION | Facility: HOSPITAL | Age: 63
End: 2023-10-23
Payer: COMMERCIAL

## 2023-10-23 DIAGNOSIS — Z96.651 STATUS POST TOTAL RIGHT KNEE REPLACEMENT: Primary | ICD-10-CM

## 2023-10-23 PROCEDURE — 97112 NEUROMUSCULAR REEDUCATION: CPT | Mod: CQ

## 2023-10-23 PROCEDURE — 97110 THERAPEUTIC EXERCISES: CPT | Mod: CQ

## 2023-10-23 NOTE — PROGRESS NOTES
[facility]  Physical Therapy Treatment Note    Name: Heidy Moyer  Clinic Number: [id number]    Therapy Diagnosis:   Encounter Diagnosis   Name Primary?    Status post total right knee replacement Yes       Physician: William Bravo MD    Visit Date: 10/23/2023    Physician Orders: PT Eval and Treat  s/p right TKA  Medical Diagnosis from Referral: s/p right TKA 8/31/23  Evaluation Date: 9/27/2023  Authorization Period Expiration:09/24/2024  Plan of Care Expiration: 10/25/23  Date of Surgery: 8/31/23  Visit # / Visits authorized: 10/15  PTA Visit: 3    Time In: 1017  Time Out: 1102  Total Billable Time: 45 minutes    Precautions: Standard and blood thinners    Subjective     Patient reports: her knee is doing well with no pain and improved gait pattern today  She was compliant with home exercise program    Pain: 0/10  Location: right knee      Objective     Heidy received therapeutic exercises to develop strength, endurance, ROM, and flexibility for 28 minutes including:    NuStep: x 5 minutes  Calf stretch on slant board: x 2 minutes  Right knee flexion stretch on step: x 10 reps with 10 second hold  Right hamstring stretch on step: 3 x 20 second hold  Long arc quads (right): 2 lbs; 2 x 10 reps  Heel slides (right) with green strap for overpressure: x 10 reps with 5 second holds; x 5 reps with overpressure from Therapist    Theraball knee flexion rolls with bilateral knee extension between reps x 4 minutes (not performed)    Heidy received the following manual therapy techniques: were applied to the: right knee for 0 minutes, including:    MFR to right hamstrings in prone  Prone right quad stretch: 3 x 20 second holds    Heidy participated in neuromuscular re-education activities to improve: Balance for 17 minutes. The following activities were included:    Quad sets (right): x 20 reps with 3 second holds with heel elevated   Short arc quads (right): 2 lbs; x 20  "reps  Straight leg raises (right): 2 x 10 reps; verbal and tactile cues for quality of movement and to reduce the extensor lag  Prone knee extensions (right): 2 x 10 reps    Heidy participated in dynamic functional therapeutic activities to improve functional performance for 0 minutes, including:    Chair squats: x 2 x 10 reps; verbal cues for equal weight distribution through bilateral lower extremities   4" lateral lowers (right): x 15 reps    4" forward step ups: 2 x 10 reps with min vc for eccentric contraction (not performed)    Heidy participated in gait training to improve functional mobility and safety for 0 minutes, including:    Gait in hallway with emphasis on achieving right terminal knee extension    Heidy received the following supervised modalities after being cleared for contradictions: NMES Electrical Stimulation:  Heidy received NMES Electrical Stimulation to elicit muscle contraction of the right quadriceps. Pt received stimulation at a rate of 0 pps with symmetric current, ramp of 2 seconds with 10 second on time and 20 second off time. Patient tolerated treatment well without any adverse effects. (not performed)     Heidy received cold pack for 0 minutes to right knee.     Right Knee AROM Right Knee PROM   Extension -1 degrees -3 degrees   Flexion (Supine) 100 degrees 105 degrees   Flexion (Sitting) 106 degrees 104 degrees     Right quad lag: .5 degree    Home Exercises Provided and Patient Education Provided     Education provided: Patient educated on the importance of completing skilled physical therapy treatment and home exercise program as prescribed to maximize functional gains.    Written Home Exercises Provided: Patient instructed to cont prior HEP. Exercises were reviewed and Heidy was able to demonstrate them prior to the end of the session.  Heidy demonstrated good  understanding of the education provided.     See EMR under Patient Instructions for exercises provided  during therapy " sessions .    Assessment      Patient with good effort overall. Patient able to perform all exercises with no complaints of increase in pain. Patient with good tolerance of added overpressure from Physical Therapist during heel slides with green strap. Patient with slight improvement in supine knee flexion this date. Patient will continue to benefit from skilled interventions until goals are met or maximum functional potential is reached.    Heidy isprogressing well towards her goals.   Pt prognosis is Excellent.     Pt will continue to benefit from skilled outpatient physical therapy to address the deficits listed in the problem list box on initial evaluation, provide pt/family education, and to maximize pt's level of independence in the home and community environment.     Pt's spiritual, cultural, and educational needs considered and pt agreeable to plan of care and goals.     Anticipated barriers to physical therapy: none    Goals:    Short Term Goals: 2 weeks   1. Independent with Home Exercise Program   2. Increase Left Knee Range of Motion to 0 Degrees to 120 Degrees  3. Increase Left Knee Strength to grossly 4+/5  4. Patient will ambulate 500 feet with Normal Gait pattern without complaints of pain      Long Term Goals: 6 weeks   1. Patient will increase Left Knee Strength to grossly 5/5  2. Patient will ambulate 1000+ feet with no complaints of pain or weakness in Left Knee    Plan     Patient will continue to benefit from skilled physical therapy treatment as prescribed working towards goals listed above to maximize functional potential.       SEYMOUR Ingram  10/23/2023

## 2023-10-25 ENCOUNTER — CLINICAL SUPPORT (OUTPATIENT)
Dept: REHABILITATION | Facility: HOSPITAL | Age: 63
End: 2023-10-25
Payer: COMMERCIAL

## 2023-10-25 DIAGNOSIS — Z96.651 STATUS POST TOTAL RIGHT KNEE REPLACEMENT: Primary | ICD-10-CM

## 2023-10-25 PROCEDURE — 97110 THERAPEUTIC EXERCISES: CPT

## 2023-10-25 PROCEDURE — 97530 THERAPEUTIC ACTIVITIES: CPT

## 2023-10-25 PROCEDURE — 97112 NEUROMUSCULAR REEDUCATION: CPT

## 2023-10-25 NOTE — PLAN OF CARE
[facility]  Physical Therapy Updated Plan of Care    Name: Heidy Moyer  Clinic Number: [id number]    Therapy Diagnosis:   Encounter Diagnosis   Name Primary?    Status post total right knee replacement Yes     Physician: William Bravo MD    Visit Date: 10/25/2023    Physician Orders: PT Eval and Treat  s/p right TKA  Medical Diagnosis from Referral: s/p right TKA 8/31/23  Evaluation Date: 9/27/2023  Authorization Period Expiration:09/24/2024  Plan of Care Expiration: 10/25/23  Date of Surgery: 8/31/23  Visit # / Visits authorized: 11/15  PTA Visit: 0    Time In: 1015  Time Out: 1100  Total Billable Time: 45 minutes    Precautions: Standard and blood thinners    Subjective     Patient reports: Her knee is doing well with no pain.    She was compliant with home exercise program    Pain: 0/10  Location: right knee      Objective     Heidy received therapeutic exercises to develop strength, endurance, ROM, and flexibility for 17 minutes including:    Airdyne bike: x 5 minutes (with hip hike compensation)  Calf stretch on slant board: x 2 minutes  Seated right hamstring stretch: 3 x 20 second holds  Heel slides (right) with green strap for overpressure: x 20 reps with 5 second holds; x 5 reps with overpressure from Physical Therapist  Long arc quads (right): 2 lbs; 2 x 10 reps    Right knee flexion stretch on step: x 10 reps with 10 second hold (not performed)   Theraball knee flexion rolls with bilateral knee extension between reps x 4 minutes (not performed)    Heidy received the following manual therapy techniques: were applied to the: right knee for 0 minutes, including:    MFR to right hamstrings in prone (not performed)   Prone right quad stretch: 3 x 20 second holds (not performed)     Heidy participated in neuromuscular re-education activities to improve: Balance for 20 minutes. The following activities were included:    Quad sets (right): x 20 reps with 3  "second holds with heel elevated   Short arc quads (right): 2 lbs; x 20 reps  Straight leg raises (right): 2 x 10 reps; verbal and tactile cues for quality of movement and to reduce the extensor lag  Standing hip abduction: 3 lbs; x 20 reps each  Standing alternating marching: 3 lbs; x 20 reps each    Prone knee extensions (right): 2 x 10 reps (not performed)     Heidy participated in dynamic functional therapeutic activities to improve functional performance for 5 minutes, including:    Sit to/from stand without upper extremity support: x 10 reps with emphasis on neutral trunk alignment    4" lateral lowers (right): x 15 reps (not performed)   4" forward step ups: 2 x 10 reps with min vc for eccentric contraction (not performed)    Heidy participated in gait training to improve functional mobility and safety for 3 minutes, including:    Gait in hallway with emphasis on achieving right terminal knee extension    Heidy received the following supervised modalities after being cleared for contradictions: NMES Electrical Stimulation:  Heidy received NMES Electrical Stimulation to elicit muscle contraction of the right quadriceps. Pt received stimulation at a rate of 0 pps with symmetric current, ramp of 2 seconds with 10 second on time and 20 second off time. Patient tolerated treatment well without any adverse effects. (not performed)     Heidy received cold pack for 0 minutes to right knee.     Right Knee AROM Right Knee PROM   Extension -3 degrees -3 degrees   Flexion (Supine) 100 degrees 105 degrees   Flexion (Sitting) 106 degrees 106 degrees     Right quad la.5 degree    Home Exercises Provided and Patient Education Provided     Education provided: Patient educated on the importance of completing skilled physical therapy treatment and home exercise program as prescribed to maximize functional gains.    Written Home Exercises Provided: Patient instructed to cont prior HEP. Exercises were reviewed and Heidy was " able to demonstrate them prior to the end of the session.  Heidy demonstrated good  understanding of the education provided.     See EMR under Patient Instructions for exercises provided during therapy sessions.    Assessment      Patient with good effort throughout treatment. Patient continues to lack right terminal knee extension. Patient's right knee remains stiff into flexion. Patient encouraged to increase emphasis of home exercise right knee range of motion. Patient able to perform all exercises with no complaints of increased pain. Patient returns to Dr. Bravo on 11/6/2023. Physical Therapist will continue to progress therapeutic exercise, neuromuscular re-education, therapeutic activities, and gait training as able with manual therapy and modalities utilized as needed.     Heidy isprogressing well towards her goals.   Pt prognosis is Excellent.     Pt will continue to benefit from skilled outpatient physical therapy to address the deficits listed in the problem list box on initial evaluation, provide pt/family education, and to maximize pt's level of independence in the home and community environment.     Pt's spiritual, cultural, and educational needs considered and pt agreeable to plan of care and goals.     Anticipated barriers to physical therapy: none    Goals:    Short Term Goals: 2 weeks   1. Independent with Home Exercise Program [Met]  2. Increase Left Knee Range of Motion to 0 Degrees to 120 Degrees [Not met: -3 - 106 degrees]  3. Increase Left Knee Strength to grossly 4+/5 [Not met: 4/5 grossly]  4. Patient will ambulate 500 feet with Normal Gait pattern without complaints of pain [Not met: incomplete right terminal knee extension]      Long Term Goals: 6 weeks   1. Patient will increase Left Knee Strength to grossly 5/5 [Not met: 4/5 grossly]  2. Patient will ambulate 1000+ feet with no complaints of pain or weakness in Left Knee [Not met]    Reasons for Recertification of Therapy: Patient has  not met all goals.     Plan     Updated Certification Period: 10/25/2023 to 11/24/2023  Recommended Treatment Plan: 2 times per week for 4 weeks: Electrical Stimulation (IFC/premodulated IFC), Gait Training, Manual Therapy, Moist Heat/ Ice, Neuromuscular Re-ed, Patient Education, Therapeutic Activities, and Therapeutic Exercise  Other Recommendations: N/A      Eric Arboleda, PT, DPT  10/25/2023

## 2023-10-27 ENCOUNTER — CLINICAL SUPPORT (OUTPATIENT)
Dept: REHABILITATION | Facility: HOSPITAL | Age: 63
End: 2023-10-27
Payer: COMMERCIAL

## 2023-10-27 DIAGNOSIS — Z96.651 STATUS POST TOTAL RIGHT KNEE REPLACEMENT: Primary | ICD-10-CM

## 2023-10-27 PROCEDURE — 97530 THERAPEUTIC ACTIVITIES: CPT | Mod: CQ

## 2023-10-27 PROCEDURE — 97112 NEUROMUSCULAR REEDUCATION: CPT | Mod: CQ

## 2023-10-27 PROCEDURE — 97110 THERAPEUTIC EXERCISES: CPT | Mod: CQ

## 2023-10-27 NOTE — PROGRESS NOTES
[facility]  Physical Therapy Treatment Note    Name: Heidy Moyer  Clinic Number: [id number]    Therapy Diagnosis:   No diagnosis found.      Physician: William Bravo MD    Visit Date: 10/27/2023    Physician Orders: PT Eval and Treat  s/p right TKA  Medical Diagnosis from Referral: s/p right TKA 8/31/23  Evaluation Date: 9/27/2023  Authorization Period Expiration:09/24/2024  Plan of Care Expiration: 10/25/23  Date of Surgery: 8/31/23  Visit # / Visits authorized: 11/15  PTA Visit: 4    Time In: 1008  Time Out: 1055  Total Billable Time: 47 minutes    Precautions: Standard and blood thinners    Subjective     Patient reports: Her knee is doing well with no pain.  She was compliant with home exercise program    Pain: 0/10  Location: right knee      Objective     Heidy received therapeutic exercises to develop strength, endurance, ROM, and flexibility for 20 minutes including:  NuStep: x 5 minutes  Calf stretch on slant board: x 2 minutes  Right knee flexion stretch on step: x 10 reps with 10 second hold  Right hamstring stretch on step: 3 x 20 second hold  Long arc quads (right): 2 lbs; 2 x 10 reps  Heel slides (right) with green strap for overpressure: x 10 reps with 5 second holds; x 5 reps with overpressure from Therapist  Theraball knee flexion rolls with bilateral knee extension between reps x 4 minutes (not performed)    Heidy participated in neuromuscular re-education activities to improve: Balance for 15 minutes. The following activities were included:  Quad sets (right): x 20 reps with 3 second holds with heel elevated   Short arc quads (right): 2 lbs; x 20 reps  Straight leg raises (right): 2 x 10 reps; verbal and tactile cues for quality of movement and to reduce the extensor lag  Prone knee extensions (right): 2 x 10 reps    Heidy participated in dynamic functional therapeutic activities to improve functional performance for 12 minutes, including:  Chair  "squats: x 2 x 10 reps; verbal cues for equal weight distribution through bilateral lower extremities   4" lateral lowers (right): x 15 reps  4" forward step ups: 2 x 10 reps with min vc for eccentric contraction     Heidy participated in gait training to improve functional mobility and safety for 0 minutes, including:  Gait in hallway with emphasis on achieving right terminal knee extension     Right Knee AROM Right Knee PROM   Extension -1 degrees -3 degrees   Flexion (Supine) 100 degrees 105 degrees   Flexion (Sitting) 106 degrees 104 degrees     Right quad lag: .5 degree    Home Exercises Provided and Patient Education Provided     Education provided: Patient educated on the importance of completing skilled physical therapy treatment and home exercise program as prescribed to maximize functional gains.    Written Home Exercises Provided: Patient instructed to cont prior HEP. Exercises were reviewed and Heidy was able to demonstrate them prior to the end of the session.  Heidy demonstrated good  understanding of the education provided.     See EMR under Patient Instructions for exercises provided  during therapy sessions .    Assessment      Patient with good effort overall. Patient able to perform all exercises with no complaints of increase in pain. Patient will continue to benefit from skilled interventions until goals are met or maximum functional potential is reached.     Heidy isprogressing well towards her goals.   Pt prognosis is Excellent.     Pt will continue to benefit from skilled outpatient physical therapy to address the deficits listed in the problem list box on initial evaluation, provide pt/family education, and to maximize pt's level of independence in the home and community environment.     Pt's spiritual, cultural, and educational needs considered and pt agreeable to plan of care and goals.     Anticipated barriers to physical therapy: none    Goals:    Short Term Goals: 2 weeks   1. " Independent with Home Exercise Program   2. Increase Left Knee Range of Motion to 0 Degrees to 120 Degrees  3. Increase Left Knee Strength to grossly 4+/5  4. Patient will ambulate 500 feet with Normal Gait pattern without complaints of pain      Long Term Goals: 6 weeks   1. Patient will increase Left Knee Strength to grossly 5/5  2. Patient will ambulate 1000+ feet with no complaints of pain or weakness in Left Knee    Plan     Patient will continue to benefit from skilled physical therapy treatment as prescribed working towards goals listed above to maximize functional potential.       SEYMOUR Stockton  10/27/2023

## 2023-10-30 ENCOUNTER — CLINICAL SUPPORT (OUTPATIENT)
Dept: REHABILITATION | Facility: HOSPITAL | Age: 63
End: 2023-10-30
Payer: COMMERCIAL

## 2023-10-30 DIAGNOSIS — Z96.651 STATUS POST TOTAL RIGHT KNEE REPLACEMENT: Primary | ICD-10-CM

## 2023-10-30 PROCEDURE — 97110 THERAPEUTIC EXERCISES: CPT

## 2023-10-30 PROCEDURE — 97530 THERAPEUTIC ACTIVITIES: CPT

## 2023-10-30 NOTE — PROGRESS NOTES
[facility]  Physical Therapy Treatment Note    Name: Heidy Moyer  Clinic Number: [id number]    Therapy Diagnosis:   Encounter Diagnosis   Name Primary?    Status post total right knee replacement Yes     Physician: William Bravo MD    Visit Date: 10/30/2023    Physician Orders: PT Eval and Treat  s/p right TKA  Medical Diagnosis from Referral: s/p right TKA 8/31/23  Evaluation Date: 9/27/2023  Authorization Period Expiration:09/24/2024  Plan of Care Expiration: 11/24/2023  Date of Surgery: 8/31/23  Visit # / Visits authorized: 13/15  PTA Visit: 0    Time In: 1015  Time Out: 1058  Total Billable Time: 43 minutes    Precautions: Standard and blood thinners    Subjective     Patient reports: Her knee is doing well with no pain.    She was compliant with home exercise program    Pain: 0/10  Location: right knee      Objective     Heidy received therapeutic exercises to develop strength, endurance, ROM, and flexibility for 30 minutes including:    Airdyne bike: 8 minutes  Calf stretch on slant board: x 2 minutes  Right knee flexion stretch on step: x 10 reps with 10 second hold  Right hamstring stretch on step: 3 x 20 second hold  Heel slides (right) with green strap for overpressure: x 10 reps with 5 second holds    Long arc quads (right): 2 lbs; 2 x 10 reps (not performed)   Theraball knee flexion rolls with bilateral knee extension between reps x 4 minutes (not performed)    Heidy participated in neuromuscular re-education activities to improve: Balance for 0 minutes. The following activities were included:    Quad sets (right): x 20 reps with 3 second holds with heel elevated (not performed)   Short arc quads (right): 2 lbs; x 20 reps (not performed)   Straight leg raises (right): 2 x 10 reps; verbal and tactile cues for quality of movement and to reduce the extensor lag (not performed)   Prone knee extensions (right): 2 x 10 reps (not performed)     Heidy  participated in dynamic functional therapeutic activities to improve functional performance for 10 minutes, includin-inch lateral lowers (right): x 15 reps  4-inch forward step ups: 2 x 10 reps with verbal cues for adequate use of right quad     Chair squats: x 2 x 10 reps; verbal cues for equal weight distribution through bilateral lower extremities (not performed)     Heidy participated in gait training to improve functional mobility and safety for 3 minutes, including:    Gait in hallway with emphasis on achieving right terminal knee extension     Right Knee AROM Right Knee PROM   Extension -3 degrees -3 degrees   Flexion (Supine) 100 degrees 110 degrees   Flexion (Sitting) 106 degrees 104 degrees     Right quad lag: .5 degree    Home Exercises Provided and Patient Education Provided     Education provided: Patient educated on the importance of completing skilled physical therapy treatment and home exercise program as prescribed to maximize functional gains.    Written Home Exercises Provided: Patient instructed to cont prior HEP. Exercises were reviewed and Heidy was able to demonstrate them prior to the end of the session.  Heidy demonstrated good  understanding of the education provided.     See EMR under Patient Instructions for exercises provided  during therapy sessions .    Assessment      Patient with good effort throughout treatment. Patient with improved ease of use of the Airdyne bike this treatment. Patient with improved right knee flexion with overpressure from green strap and Physical Therapist. Patient with 2 physical therapy treatment sessions remaining before prior authorization will be required for additional visits. Physical Therapist will continue to progress therapeutic exercise, neuromuscular re-education, therapeutic activities, and gait training as able with manual therapy and modalities utilized as needed.     Heidy isprogressing well towards her goals.   Pt prognosis is Excellent.      Pt will continue to benefit from skilled outpatient physical therapy to address the deficits listed in the problem list box on initial evaluation, provide pt/family education, and to maximize pt's level of independence in the home and community environment.     Pt's spiritual, cultural, and educational needs considered and pt agreeable to plan of care and goals.     Anticipated barriers to physical therapy: none    Goals:    Short Term Goals: 2 weeks   1. Independent with Home Exercise Program [Met]  2. Increase Left Knee Range of Motion to 0 Degrees to 120 Degrees [Not met: -3 - 106 degrees]  3. Increase Left Knee Strength to grossly 4+/5 [Not met: 4/5 grossly]  4. Patient will ambulate 500 feet with Normal Gait pattern without complaints of pain [Not met: incomplete right terminal knee extension]      Long Term Goals: 6 weeks   1. Patient will increase Left Knee Strength to grossly 5/5 [Not met: 4/5 grossly]  2. Patient will ambulate 1000+ feet with no complaints of pain or weakness in Left Knee [Not met]    Plan     Patient will continue to benefit from skilled physical therapy treatment as prescribed working towards goals listed above to maximize functional potential.       Eric Arboleda, PT, DPT  10/30/2023

## 2023-11-03 ENCOUNTER — CLINICAL SUPPORT (OUTPATIENT)
Dept: REHABILITATION | Facility: HOSPITAL | Age: 63
End: 2023-11-03
Payer: COMMERCIAL

## 2023-11-03 DIAGNOSIS — Z96.651 STATUS POST TOTAL RIGHT KNEE REPLACEMENT: Primary | ICD-10-CM

## 2023-11-03 DIAGNOSIS — Z96.651 STATUS POST TOTAL KNEE REPLACEMENT, RIGHT: Primary | ICD-10-CM

## 2023-11-03 PROCEDURE — 97014 ELECTRIC STIMULATION THERAPY: CPT | Mod: CQ

## 2023-11-03 PROCEDURE — 97110 THERAPEUTIC EXERCISES: CPT | Mod: CQ

## 2023-11-03 PROCEDURE — 97010 HOT OR COLD PACKS THERAPY: CPT | Mod: CQ

## 2023-11-03 PROCEDURE — 97112 NEUROMUSCULAR REEDUCATION: CPT | Mod: CQ

## 2023-11-03 PROCEDURE — 97530 THERAPEUTIC ACTIVITIES: CPT | Mod: CQ

## 2023-11-03 NOTE — PROGRESS NOTES
[facility]  Physical Therapy Treatment Note    Name: Heidy Moyer  Clinic Number: [id number]    Therapy Diagnosis:   Encounter Diagnosis   Name Primary?    Status post total right knee replacement Yes     Physician: William Bravo MD    Visit Date: 11/3/2023    Physician Orders: PT Eval and Treat  s/p right TKA  Medical Diagnosis from Referral: s/p right TKA 8/31/23  Evaluation Date: 9/27/2023  Authorization Period Expiration:09/24/2024  Plan of Care Expiration: 11/24/2023  Date of Surgery: 8/31/23  Visit # / Visits authorized: 14/15  PTA Visit: 1    Time In: 1015  Time Out: 1113  Total Billable Time: 58 minutes    Precautions: Standard and blood thinners    Subjective     Patient reports: Her knee is doing well with no pain just stiffness noted    She was compliant with home exercise program    Pain: 0/10  Location: right knee    Objective     Heidy received therapeutic exercises to develop strength, endurance, ROM, and flexibility for 20 minutes including:    Airdyne bike: 5 minutes  Calf stretch on slant board: x 2 minutes  Right knee flexion stretch on step: x 10 reps with 10 second hold  Right hamstring stretch on step: 3 x 20 second hold  Heel slides (right) with green strap for overpressure: x 10 reps with 5 second holds    Long arc quads (right): 2 lbs; 2 x 10 reps (not performed)   Theraball knee flexion rolls with bilateral knee extension between reps x 4 minutes (not performed)    Heidy participated in neuromuscular re-education activities to improve: Balance for 13 minutes. The following activities were included:    Quad sets (right): x 20 reps with 3 second holds with heel elevated  Short arc quads (right): 2 lbs; x 20 reps  Straight leg raises (right): 2 x 10 reps; verbal and tactile cues for quality of movement and to reduce the extensor lag     Prone knee extensions (right): 2 x 10 reps (not performed)     Heidy participated in dynamic functional  therapeutic activities to improve functional performance for 10 minutes, includin-inch lateral lowers (right): x 15 reps  4-inch forward step ups: 2 x 10 reps with verbal cues for adequate use of right quad     Chair squats: x 2 x 10 reps; verbal cues for equal weight distribution through bilateral lower extremities (not performed)     Heidy participated in gait training to improve functional mobility and safety for 0 minutes, including:    Gait in hallway with emphasis on achieving right terminal knee extension    Heidy received the following supervised modalities after being cleared for contradictions: IFC Electrical Stimulation:  Heidy received IFC Electrical Stimulation for pain control applied to the right knee. Pt received stimulation at a frequency of 18 V for 15 minutes. Heidy tolderated treatment well without any adverse effects.      Heidy received a cold pack to her right knee for 15 minutes in conjunction with IFC electrical stimulation.       Right Knee AROM Right Knee PROM   Extension -3 degrees -3 degrees   Flexion (Supine) 100 degrees 110 degrees   Flexion (Sitting) 106 degrees 104 degrees     Right quad lag: .5 degree    Home Exercises Provided and Patient Education Provided     Education provided: Patient educated on the importance of completing skilled physical therapy treatment and home exercise program as prescribed to maximize functional gains.    Written Home Exercises Provided: Patient instructed to cont prior HEP. Exercises were reviewed and Heidy was able to demonstrate them prior to the end of the session.  Heidy demonstrated good  understanding of the education provided.     See EMR under Patient Instructions for exercises provided  during therapy sessions .    Assessment      Patient with good effort throughout treatment. Patient with improving knee flexion and extension. IFC electrical stimulation and cold pack applied post treatment for pain control. Patient with 1 physical  therapy treatment sessions remaining before prior authorization will be required for additional visits. Physical Therapist Assistant will continue to progress therapeutic exercise, neuromuscular re-education, therapeutic activities, and gait training as able with manual therapy and modalities utilized as needed.     Heidy isprogressing well towards her goals.   Pt prognosis is Excellent.     Pt will continue to benefit from skilled outpatient physical therapy to address the deficits listed in the problem list box on initial evaluation, provide pt/family education, and to maximize pt's level of independence in the home and community environment.     Pt's spiritual, cultural, and educational needs considered and pt agreeable to plan of care and goals.     Anticipated barriers to physical therapy: none    Goals:    Short Term Goals: 2 weeks   1. Independent with Home Exercise Program [Met]  2. Increase Left Knee Range of Motion to 0 Degrees to 120 Degrees [Not met: -3 - 106 degrees]  3. Increase Left Knee Strength to grossly 4+/5 [Not met: 4/5 grossly]  4. Patient will ambulate 500 feet with Normal Gait pattern without complaints of pain [Not met: incomplete right terminal knee extension]      Long Term Goals: 6 weeks   1. Patient will increase Left Knee Strength to grossly 5/5 [Not met: 4/5 grossly]  2. Patient will ambulate 1000+ feet with no complaints of pain or weakness in Left Knee [Not met]    Plan     Patient will continue to benefit from skilled physical therapy treatment as prescribed working towards goals listed above to maximize functional potential.       SEYMOUR Ingram  11/3/2023

## 2023-11-06 ENCOUNTER — HOSPITAL ENCOUNTER (OUTPATIENT)
Dept: RADIOLOGY | Facility: HOSPITAL | Age: 63
Discharge: HOME | End: 2023-11-06
Attending: ORTHOPAEDIC SURGERY
Payer: COMMERCIAL

## 2023-11-06 ENCOUNTER — OFFICE VISIT (OUTPATIENT)
Dept: ORTHOPEDICS | Facility: CLINIC | Age: 63
End: 2023-11-06
Payer: COMMERCIAL

## 2023-11-06 VITALS — WEIGHT: 260 LBS | HEIGHT: 68 IN | BODY MASS INDEX: 39.4 KG/M2

## 2023-11-06 DIAGNOSIS — Z96.651 STATUS POST TOTAL KNEE REPLACEMENT, RIGHT: Primary | ICD-10-CM

## 2023-11-06 DIAGNOSIS — Z96.651 STATUS POST TOTAL KNEE REPLACEMENT, RIGHT: ICD-10-CM

## 2023-11-06 PROCEDURE — 20610 LARGE JOINT ASPIRATION/INJECTION: L KNEE: ICD-10-PCS | Mod: S$PBB,79,LT, | Performed by: ORTHOPAEDIC SURGERY

## 2023-11-06 PROCEDURE — 99024 POSTOP FOLLOW-UP VISIT: CPT | Mod: ,,, | Performed by: ORTHOPAEDIC SURGERY

## 2023-11-06 PROCEDURE — 73560 X-RAY EXAM OF KNEE 1 OR 2: CPT | Mod: TC,RT

## 2023-11-06 PROCEDURE — 4010F ACE/ARB THERAPY RXD/TAKEN: CPT | Mod: ,,, | Performed by: ORTHOPAEDIC SURGERY

## 2023-11-06 PROCEDURE — 99999PBSHW PR PBB SHADOW TECHNICAL ONLY FILED TO HB: ICD-10-PCS | Mod: PBBFAC,,,

## 2023-11-06 PROCEDURE — 99999PBSHW PR PBB SHADOW TECHNICAL ONLY FILED TO HB: Mod: PBBFAC,,,

## 2023-11-06 PROCEDURE — 73560 XR KNEE 1 OR 2 VIEW RIGHT: ICD-10-PCS | Mod: 26,RT,, | Performed by: ORTHOPAEDIC SURGERY

## 2023-11-06 PROCEDURE — 20610 DRAIN/INJ JOINT/BURSA W/O US: CPT | Mod: PBBFAC | Performed by: ORTHOPAEDIC SURGERY

## 2023-11-06 PROCEDURE — 99213 OFFICE O/P EST LOW 20 MIN: CPT | Mod: PBBFAC,25 | Performed by: ORTHOPAEDIC SURGERY

## 2023-11-06 PROCEDURE — 4010F PR ACE/ARB THEARPY RXD/TAKEN: ICD-10-PCS | Mod: ,,, | Performed by: ORTHOPAEDIC SURGERY

## 2023-11-06 PROCEDURE — 1159F MED LIST DOCD IN RCRD: CPT | Mod: ,,, | Performed by: ORTHOPAEDIC SURGERY

## 2023-11-06 PROCEDURE — 1159F PR MEDICATION LIST DOCUMENTED IN MEDICAL RECORD: ICD-10-PCS | Mod: ,,, | Performed by: ORTHOPAEDIC SURGERY

## 2023-11-06 PROCEDURE — 73560 X-RAY EXAM OF KNEE 1 OR 2: CPT | Mod: 26,RT,, | Performed by: ORTHOPAEDIC SURGERY

## 2023-11-06 PROCEDURE — 99024 PR POST-OP FOLLOW-UP VISIT: ICD-10-PCS | Mod: ,,, | Performed by: ORTHOPAEDIC SURGERY

## 2023-11-06 RX ORDER — BUPIVACAINE HYDROCHLORIDE 2.5 MG/ML
1 INJECTION, SOLUTION EPIDURAL; INFILTRATION; INTRACAUDAL
Status: DISCONTINUED | OUTPATIENT
Start: 2023-11-06 | End: 2023-11-06 | Stop reason: HOSPADM

## 2023-11-06 RX ORDER — TRIAMCINOLONE ACETONIDE 40 MG/ML
40 INJECTION, SUSPENSION INTRA-ARTICULAR; INTRAMUSCULAR
Status: DISCONTINUED | OUTPATIENT
Start: 2023-11-06 | End: 2023-11-06 | Stop reason: HOSPADM

## 2023-11-06 RX ADMIN — BUPIVACAINE HYDROCHLORIDE 1 ML: 2.5 INJECTION, SOLUTION EPIDURAL; INFILTRATION; INTRACAUDAL at 09:11

## 2023-11-06 RX ADMIN — TRIAMCINOLONE ACETONIDE 40 MG: 40 INJECTION, SUSPENSION INTRA-ARTICULAR; INTRAMUSCULAR at 09:11

## 2023-11-06 NOTE — PROGRESS NOTES
Per Dr. Cazares and current protocol patient was contacted and states they are doing well. No post op concerns and agrees to reschedule appointment 4/14/20 .            Patient was instructed and agreed to contact office if there are any problems or questions prior to appointment.        Two views AP lateral right knee skeletally mature individual there is normal mineralization there is total knee arthroplasty in place no loosening impression total knee arthroplasty in place right knee no loosening

## 2023-11-06 NOTE — PROGRESS NOTES
Patient is here for follow-up of her right total knee arthroplasty.  Wounds are clean and dry.  She has good motion of the knee on extension.  She is working on her flexion.  No instability.  X-rays show no loosening of the components.  I will follow back up in 3 months.  She is currently going to therapy .  She has 160° of flexion.  I will let him continue work on her flexion.  Her left knee she is having tenderness over the mediolateral joint lines little bit of crepitus on motion going to send her to therapy for the right knee I injected her left knee 1 cc Marcaine 1 cc Kenalog I will get x-rays of bilateral knees when she follows up in 3 monthsHeidy Luis Moyer presents today for knee pain from ploa.  leftKnee prepped sterile technique and injected with 1cc marcaine snd 1cc kenalog.  Tolerated procedure well. Verbal consent obtained

## 2023-11-06 NOTE — PROCEDURES
Large Joint Aspiration/Injection: L knee    Date/Time: 11/6/2023 9:50 AM    Performed by: William Bravo MD  Authorized by: William Bravo MD    Consent Done?:  Yes (Verbal)  Indications:  Pain    Details:  Needle Size:  22 G  Ultrasonic Guidance for needle placement?: No    Approach:  Anterolateral  Location:  Knee  Site:  L knee  Medications:  1 mL BUPivacaine (PF) 0.25% (2.5 mg/ml) 0.25 % (2.5 mg/mL); 40 mg triamcinolone acetonide 40 mg/mL  Patient tolerance:  Patient tolerated the procedure well with no immediate complications

## 2023-11-08 ENCOUNTER — CLINICAL SUPPORT (OUTPATIENT)
Dept: REHABILITATION | Facility: HOSPITAL | Age: 63
End: 2023-11-08
Payer: COMMERCIAL

## 2023-11-08 DIAGNOSIS — Z96.651 STATUS POST TOTAL RIGHT KNEE REPLACEMENT: Primary | ICD-10-CM

## 2023-11-08 PROCEDURE — 97112 NEUROMUSCULAR REEDUCATION: CPT

## 2023-11-08 PROCEDURE — 97530 THERAPEUTIC ACTIVITIES: CPT

## 2023-11-08 PROCEDURE — 97110 THERAPEUTIC EXERCISES: CPT

## 2023-11-08 NOTE — PLAN OF CARE
[facility]  Physical Therapy Updated Plan of Care    Name: Heidy Moyer  Clinic Number: [id number]    Therapy Diagnosis:   Encounter Diagnosis   Name Primary?    Status post total right knee replacement Yes     Physician: William Bravo MD    Visit Date: 11/8/2023    Physician Orders: PT Eval and Treat  s/p right TKA  Medical Diagnosis from Referral: s/p right TKA 8/31/23  Evaluation Date: 9/27/2023  Authorization Period Expiration:09/24/2024  Plan of Care Expiration: 12/22/2023  Date of Surgery: 8/31/23  Visit # / Visits authorized: 15/15  PTA Visit: 0    Time In: 0933  Time Out: 1020  Total Billable Time: 47 minutes    Precautions: Standard and blood thinners    Subjective     Patient reports: Her knee is doing well. Patient reports she saw Dr. Bravo recently who injected her left knee due to increased pain and ordered an additional 6 weeks of physical therapy for her right knee.     She was compliant with home exercise program    Pain: 0/10  Location: right knee    Objective     Heidy received therapeutic exercises to develop strength, endurance, ROM, and flexibility for 27 minutes including:    Airdyne bike: 5 minutes  Calf stretch on slant board: x 2 minutes  Right knee flexion stretch on step: x 10 reps with 10 second holds  Right hamstring stretch on step: 3 x 20 second holds  Long arc quads (right): 3 lbs; x 15 reps   Heel slides (right) with green strap for overpressure: x 10 reps with 5 second holds    Theraball knee flexion rolls with bilateral knee extension between reps x 4 minutes (not performed)    Heidy participated in neuromuscular re-education activities to improve: Balance for 10 minutes. The following activities were included:    Quad sets (right): x 15 reps with 3 second holds with heel elevated  Short arc quads (right): 3 lbs; x 15 reps  Straight leg raises (right): x 10 reps; verbal and tactile cues for quality of movement and to reduce the  extensor lag     Prone knee extensions (right): 2 x 10 reps (not performed)     Heidy participated in dynamic functional therapeutic activities to improve functional performance for 10 minutes, including:    Forward step ups on a 6-inch step with least required bilateral upper extremity support: x 20 reps  Lateral step lowers on a 4-inch step with unilateral upper extremity support: 3 x 5 reps    Chair squats: x 2 x 10 reps; verbal cues for equal weight distribution through bilateral lower extremities (not performed)     Heidy participated in gait training to improve functional mobility and safety for 0 minutes, including:    Gait in hallway with emphasis on achieving right terminal knee extension (not performed)     Heidy received the following supervised modalities after being cleared for contradictions: IFC Electrical Stimulation for pain control applied to the right knee. Pt received stimulation at a frequency of 18 V for 0 minutes. Heidy tolderated treatment well without any adverse effects.      Heidy received a cold pack to her right knee for 0 minutes in conjunction with IFC electrical stimulation.     Right Knee AROM Right Knee PROM   Extension -3 degrees -3 degrees   Flexion (Supine) 106 degrees 110 degrees     Right quad la degree    Home Exercises Provided and Patient Education Provided     Education provided: Patient educated on the importance of completing skilled physical therapy treatment and home exercise program as prescribed to maximize functional gains.    Written Home Exercises Provided: Patient instructed to cont prior HEP. Exercises were reviewed and Heidy was able to demonstrate them prior to the end of the session.  Heidy demonstrated good  understanding of the education provided.     See EMR under Patient Instructions for exercises provided during therapy sessions.    Assessment      Patient with good effort throughout treatment. Patient able to actively flex her right knee further this  treatment (106 degrees) but with the same passive flexion as last treatment (110 degrees). Patient continues to have incomplete right terminal knee extension (-3 degrees) and a 1 degree right quad lag. Patient continues to require skilled physical therapy intervention to address these deficits. Physical Therapist will continue to progress therapeutic exercise, neuromuscular re-education, therapeutic activities, and gait training as able with manual therapy and modalities utilized as needed.     Heidy isprogressing well towards her goals.   Pt prognosis is Excellent.     Pt will continue to benefit from skilled outpatient physical therapy to address the deficits listed in the problem list box on initial evaluation, provide pt/family education, and to maximize pt's level of independence in the home and community environment.     Pt's spiritual, cultural, and educational needs considered and pt agreeable to plan of care and goals.     Anticipated barriers to physical therapy: none    Goals:    Short Term Goals: 2 weeks   1. Independent with Home Exercise Program [Met]  2. Increase Left Knee Range of Motion to 0 Degrees to 120 Degrees [Not met: -3 - 106 degrees]  3. Increase Left Knee Strength to grossly 4+/5 [Not met: 4/5 grossly]  4. Patient will ambulate 500 feet with Normal Gait pattern without complaints of pain [Not met: incomplete right terminal knee extension]      Long Term Goals: 6 weeks   1. Patient will increase Left Knee Strength to grossly 5/5 [Not met: 4/5 grossly]  2. Patient will ambulate 1000+ feet with no complaints of pain or weakness in Left Knee [Not met]    Reasons for Recertification of Therapy: Patient has not met all goals.     Plan     Updated Certification Period: 11/8/2023 to 12/22/2023  Recommended Treatment Plan: 2 times per week for 6 weeks: Electrical Stimulation (IFC/premodulated IFC/[place]/neuromuscular electrical stimulation), Gait Training, Manual Therapy, Moist Heat/ Ice,  Neuromuscular Re-ed, Patient Education, Therapeutic Activities, and Therapeutic Exercise  Other Recommendations: N/A      Eric Arboleda, PT, DPT  11/8/2023

## 2023-11-28 ENCOUNTER — CLINICAL SUPPORT (OUTPATIENT)
Dept: REHABILITATION | Facility: HOSPITAL | Age: 63
End: 2023-11-28
Payer: COMMERCIAL

## 2023-11-28 DIAGNOSIS — Z96.651 STATUS POST TOTAL RIGHT KNEE REPLACEMENT: Primary | ICD-10-CM

## 2023-11-28 PROCEDURE — 97110 THERAPEUTIC EXERCISES: CPT

## 2023-11-28 PROCEDURE — 97112 NEUROMUSCULAR REEDUCATION: CPT

## 2023-11-28 PROCEDURE — 97530 THERAPEUTIC ACTIVITIES: CPT

## 2023-11-28 NOTE — PROGRESS NOTES
[facility]  Physical Therapy Treatment Note    Name: Heidy Moyer  Clinic Number: [id number]    Therapy Diagnosis:   Encounter Diagnosis   Name Primary?    Status post total right knee replacement Yes     Physician: William Bravo MD    Visit Date: 11/28/2023     Physician Orders: PT Eval and Treat  s/p right TKA  Medical Diagnosis from Referral: s/p right TKA 8/31/23  Evaluation Date: 9/27/2023  Authorization Period Expiration:09/24/2024  Plan of Care Expiration: 12/22/2023  Date of Surgery: 8/31/23  Visit # / Visits authorized: 16/23  PTA Visit: 0    Time In: 1020  Time Out: 1105  Total Billable Time: 45 minutes    Precautions: Standard and blood thinners    Subjective     Patient reports: She feels as though her right knee is doing well. Patient has not been seen by Physical Therapist in the last couple weeks as we were awaiting further insurance approval. Patient was approved for 8 additional visits (including today's appointment).    She was compliant with home exercise program    Pain: 0/10  Location: right knee    Objective     Heidy received therapeutic exercises to develop strength, endurance, ROM, and flexibility for 20 minutes including:    Airdyne bike: 5 minutes  Calf stretch on slant board: x 2 minutes  Right hamstring stretch on step: 3 x 20 second holds  Long arc quads (right): 4 lbs; x 20 reps   Heel slides (right) with green strap for overpressure: x 15 reps with 5 second holds    Right knee flexion stretch on step: x 10 reps with 10 second holds (not performed)  Theraball knee flexion rolls with bilateral knee extension between reps x 4 minutes (not performed)    Heidy participated in neuromuscular re-education activities to improve: Balance for 15 minutes. The following activities were included:    Quad sets (right): x 20 reps with 3 second holds  Short arc quads (right): 3 lbs; x 20 reps  Straight leg raises (right): 1.5 lbs; 2 x 10 reps; verbal  and tactile cues for quality of movement and to reduce the extensor lag     Prone knee extensions (right): 2 x 10 reps (not performed)     Heidy participated in dynamic functional therapeutic activities to improve functional performance for 5 minutes, including:    Forward step ups on a 6-inch step with least required bilateral upper extremity support: x 20 reps    Lateral step lowers on a 4-inch step with unilateral upper extremity support: 3 x 5 reps (not performed)   Chair squats: x 2 x 10 reps; verbal cues for equal weight distribution through bilateral lower extremities (not performed)     Heidy participated in gait training to improve functional mobility and safety for 5 minutes, including:    Gait in hallway with emphasis on achieving right terminal knee extension    Heidy received the following supervised modalities after being cleared for contradictions: IFC Electrical Stimulation for pain control applied to the right knee. Pt received stimulation at a frequency of 18 V for 0 minutes. Heidy tolderated treatment well without any adverse effects.      Heidy received a cold pack to her right knee for 0 minutes in conjunction with IFC electrical stimulation.     Right Knee AROM Right Knee PROM   Extension -4 degrees -3 degrees   Flexion (Supine) 109 degrees 111 degrees     Right quad la degree    Home Exercises Provided and Patient Education Provided     Education provided: Patient educated on the importance of completing skilled physical therapy treatment and home exercise program as prescribed to maximize functional gains.    Written Home Exercises Provided: Patient instructed to cont prior HEP. Exercises were reviewed and Heidy was able to demonstrate them prior to the end of the session.  Heidy demonstrated good  understanding of the education provided.     See EMR under Patient Instructions for exercises provided during therapy sessions.    Assessment      Patient with good effort throughout treatment.  Patient able to actively flex her right knee further this treatment (109 degrees). Patient continues to have incomplete right terminal knee extension (-4 degrees). Patient able to perform a right straight leg raise this treatment without a quad lag. Patient able to increase weight with right straight leg raise and long arc quads. Physical Therapist will continue to progress therapeutic exercise, neuromuscular re-education, therapeutic activities, and gait training as able with manual therapy and modalities utilized as needed.     Heidy is progressing well towards her goals.   Pt prognosis is Excellent.     Pt will continue to benefit from skilled outpatient physical therapy to address the deficits listed in the problem list box on initial evaluation, provide pt/family education, and to maximize pt's level of independence in the home and community environment.     Pt's spiritual, cultural, and educational needs considered and pt agreeable to plan of care and goals.     Anticipated barriers to physical therapy: none    Goals:    Short Term Goals: 2 weeks   1. Independent with Home Exercise Program [Met]  2. Increase Left Knee Range of Motion to 0 Degrees to 120 Degrees [Not met: -3 - 106 degrees]  3. Increase Left Knee Strength to grossly 4+/5 [Not met: 4/5 grossly]  4. Patient will ambulate 500 feet with Normal Gait pattern without complaints of pain [Not met: incomplete right terminal knee extension]      Long Term Goals: 6 weeks   1. Patient will increase Left Knee Strength to grossly 5/5 [Not met: 4/5 grossly]  2. Patient will ambulate 1000+ feet with no complaints of pain or weakness in Left Knee [Not met]    Reasons for Recertification of Therapy: Patient has not met all goals.     Plan     Patient will continue to benefit from skilled physical therapy treatment as prescribed working towards goals listed above to maximize functional potential.       Eric Arboleda, PT, DPT  11/28/2023

## 2023-11-30 ENCOUNTER — CLINICAL SUPPORT (OUTPATIENT)
Dept: REHABILITATION | Facility: HOSPITAL | Age: 63
End: 2023-11-30
Payer: COMMERCIAL

## 2023-11-30 DIAGNOSIS — Z96.651 STATUS POST TOTAL RIGHT KNEE REPLACEMENT: Primary | ICD-10-CM

## 2023-11-30 PROCEDURE — 97110 THERAPEUTIC EXERCISES: CPT

## 2023-11-30 PROCEDURE — 97530 THERAPEUTIC ACTIVITIES: CPT

## 2023-11-30 PROCEDURE — 97112 NEUROMUSCULAR REEDUCATION: CPT

## 2023-11-30 NOTE — PROGRESS NOTES
[facility]  Physical Therapy Treatment Note    Name: Heidy Moyer  Clinic Number: [id number]    Therapy Diagnosis:   Encounter Diagnosis   Name Primary?    Status post total right knee replacement Yes     Physician: William Bravo MD    Visit Date: 11/30/2023     Physician Orders: PT Eval and Treat  s/p right TKA  Medical Diagnosis from Referral: s/p right TKA 8/31/23  Evaluation Date: 9/27/2023  Authorization Period Expiration:09/24/2024  Plan of Care Expiration: 12/22/2023  Date of Surgery: 8/31/23  Visit # / Visits authorized: 17/23  PTA Visit: 0    Time In: 1103  Time Out: 1143  Total Billable Time: 40 minutes    Precautions: Standard and blood thinners    Subjective     Patient reports: She is doing okay. No new issues to report in regards to her right knee.    She was compliant with home exercise program    Pain: 0/10  Location: right knee    Objective     Heidy received therapeutic exercises to develop strength, endurance, ROM, and flexibility for 18 minutes including:    Airdyne bike: level 3; 5 minutes  Calf stretch on slant board: x 2 minutes  Right hamstring stretch on step: 3 x 20 second holds  Heel slides (right) with green strap for overpressure: x 15 reps with 5 second holds  Standing hip abduction: red theraband; x 20 reps    Long arc quads (right): 4 lbs; x 20 reps (not performed)   Right knee flexion stretch on step: x 10 reps with 10 second holds (not performed)  Theraball knee flexion rolls with bilateral knee extension between reps x 4 minutes (not performed)    Heidy participated in neuromuscular re-education activities to improve: Balance for 12 minutes. The following activities were included:    Right single leg stance without upper extremity support  Tandem gait forward/backward on foam beam with least required upper extremity support  Side steps on foam beam without upper extremity support     Quad sets (right): x 20 reps with 3 second holds  (not performed)   Short arc quads (right): 3 lbs; x 20 reps (not performed)   Straight leg raises (right): 1.5 lbs; 2 x 10 reps; verbal and tactile cues for quality of movement and to reduce the extensor lag (not performed)     Prone knee extensions (right): 2 x 10 reps (not performed)     Heidy participated in dynamic functional therapeutic activities to improve functional performance for 10 minutes, including:    Forward step ups on a 6-inch step with least required bilateral upper extremity support: x 20 reps  Chair squats: x 2 x 10 reps; verbal cues for equal weight distribution through bilateral lower extremities    Lateral step lowers on a 4-inch step with unilateral upper extremity support: 3 x 5 reps (not performed)     Heidy participated in gait training to improve functional mobility and safety for 0 minutes, including:    Gait in hallway with emphasis on achieving right terminal knee extension (not performed)     Heidy received the following supervised modalities after being cleared for contradictions: IFC Electrical Stimulation for pain control applied to the right knee. Pt received stimulation at a frequency of 18 V for 0 minutes. Heidy tolderated treatment well without any adverse effects.      Heidy received a cold pack to her right knee for 0 minutes in conjunction with IFC electrical stimulation.     Right Knee AROM Right Knee PROM   Extension -3 degrees -3 degrees   Flexion (Supine) 110 degrees 112 degrees     Right quad la degree    Home Exercises Provided and Patient Education Provided     Education provided: Patient educated on the importance of completing skilled physical therapy treatment and home exercise program as prescribed to maximize functional gains.    Written Home Exercises Provided: Patient instructed to cont prior HEP. Exercises were reviewed and Heidy was able to demonstrate them prior to the end of the session.  Heidy demonstrated good  understanding of the education provided.      See EMR under Patient Instructions for exercises provided during therapy sessions.    Assessment      Patient with good effort throughout treatment. Patient able to actively (110 degrees) and passively (112 degrees) flex her right knee further this treatment. Patient continues to have incomplete right terminal knee extension (-3 degrees). Patient with good tolerance of new therapeutic exercise and neuromuscular re-education activities. Physical Therapist will continue to progress therapeutic exercise, neuromuscular re-education, therapeutic activities, and gait training as able with manual therapy and modalities utilized as needed.    Heidy is progressing well towards her goals.   Pt prognosis is Excellent.     Pt will continue to benefit from skilled outpatient physical therapy to address the deficits listed in the problem list box on initial evaluation, provide pt/family education, and to maximize pt's level of independence in the home and community environment.     Pt's spiritual, cultural, and educational needs considered and pt agreeable to plan of care and goals.     Anticipated barriers to physical therapy: none    Goals:    Short Term Goals: 2 weeks   1. Independent with Home Exercise Program [Met]  2. Increase Left Knee Range of Motion to 0 Degrees to 120 Degrees [Not met: -3 - 106 degrees]  3. Increase Left Knee Strength to grossly 4+/5 [Not met: 4/5 grossly]  4. Patient will ambulate 500 feet with Normal Gait pattern without complaints of pain [Not met: incomplete right terminal knee extension]      Long Term Goals: 6 weeks   1. Patient will increase Left Knee Strength to grossly 5/5 [Not met: 4/5 grossly]  2. Patient will ambulate 1000+ feet with no complaints of pain or weakness in Left Knee [Not met]    Reasons for Recertification of Therapy: Patient has not met all goals.     Plan     Patient will continue to benefit from skilled physical therapy treatment as prescribed working towards goals  listed above to maximize functional potential.       Eric Arboleda, PT, DPT  11/28/2023

## 2023-12-04 ENCOUNTER — CLINICAL SUPPORT (OUTPATIENT)
Dept: REHABILITATION | Facility: HOSPITAL | Age: 63
End: 2023-12-04
Payer: COMMERCIAL

## 2023-12-04 DIAGNOSIS — Z96.651 STATUS POST TOTAL RIGHT KNEE REPLACEMENT: Primary | ICD-10-CM

## 2023-12-04 PROCEDURE — 97530 THERAPEUTIC ACTIVITIES: CPT | Mod: CQ

## 2023-12-04 PROCEDURE — 97110 THERAPEUTIC EXERCISES: CPT | Mod: CQ

## 2023-12-04 PROCEDURE — 97112 NEUROMUSCULAR REEDUCATION: CPT | Mod: CQ

## 2023-12-04 NOTE — PROGRESS NOTES
[facility]  Physical Therapy Treatment Note    Name: Heidy Moyer  Clinic Number: [id number]    Therapy Diagnosis:   Encounter Diagnosis   Name Primary?    Status post total right knee replacement Yes     Physician: William Bravo MD    Visit Date: 12/4/2023     Physician Orders: PT Eval and Treat  s/p right TKA  Medical Diagnosis from Referral: s/p right TKA 8/31/23  Evaluation Date: 9/27/2023  Authorization Period Expiration:09/24/2024  Plan of Care Expiration: 12/22/2023  Date of Surgery: 8/31/23  Visit # / Visits authorized: 18/23  PTA Visit: 1    Time In: 1018  Time Out: 1100  Total Billable Time: 42 minutes    Precautions: Standard and blood thinners    Subjective     Patient reports: nnew issues to report in regards to her right knee.    She was compliant with home exercise program    Pain: 0/10  Location: right knee    Objective     Heidy received therapeutic exercises to develop strength, endurance, ROM, and flexibility for 20 minutes including:    Airdyne bike: 5 minutes  Calf stretch on slant board: x 2 minutes  Right hamstring stretch on step: 3 x 20 second holds  Heel slides (right) with green strap for overpressure: x 15 reps with 5 second holds  Standing hip abduction: red theraband; x 20 reps  Right knee flexion stretch on step: x 10 reps with 10 second holds  Knee extension stretch (manual): 3 x 30 sec hold    Long arc quads (right): 4 lbs; x 20 reps (not performed)   Theraball knee flexion rolls with bilateral knee extension between reps x 4 minutes (not performed)    Heidy participated in neuromuscular re-education activities to improve: Balance for 12 minutes. The following activities were included:    Right single leg stance without upper extremity support  Tandem gait forward/backward on foam beam with least required upper extremity support  Side steps on foam beam without upper extremity support     Quad sets (right): x 20 reps with 3 second  holds (not performed)   Short arc quads (right): 3 lbs; x 20 reps (not performed)   Straight leg raises (right): 1.5 lbs; 2 x 10 reps; verbal and tactile cues for quality of movement and to reduce the extensor lag (not performed)   Prone knee extensions (right): 2 x 10 reps (not performed)     Heidy participated in dynamic functional therapeutic activities to improve functional performance for 10 minutes, including:    Forward step ups on a 6-inch step with least required bilateral upper extremity support: x 20 reps  Chair squats: x 2 x 10 reps; verbal cues for equal weight distribution through bilateral lower extremities    Lateral step lowers on a 4-inch step with unilateral upper extremity support: 3 x 5 reps (not performed)     Heidy participated in gait training to improve functional mobility and safety for 0 minutes, including:    Gait in hallway with emphasis on achieving right terminal knee extension (not performed)     Heidy received the following supervised modalities after being cleared for contradictions: IFC Electrical Stimulation for pain control applied to the right knee. Pt received stimulation at a frequency of 18 V for 0 minutes. Heidy tolderated treatment well without any adverse effects.      Heidy received a cold pack to her right knee for 0 minutes in conjunction with IFC electrical stimulation.     Right Knee AROM Right Knee PROM   Extension -3 degrees -3 degrees   Flexion (Supine) 110 degrees 112 degrees     Right quad la degree    Home Exercises Provided and Patient Education Provided     Education provided: Patient educated on the importance of completing skilled physical therapy treatment and home exercise program as prescribed to maximize functional gains.    Written Home Exercises Provided: Patient instructed to cont prior HEP. Exercises were reviewed and Heidy was able to demonstrate them prior to the end of the session.  Heidy demonstrated good  understanding of the education  provided.     See EMR under Patient Instructions for exercises provided during therapy sessions.    Assessment      Patient with good effort throughout treatment. Patient continues to have incomplete right terminal knee extension (-3 degrees), but had good tolerance of added knee extension manual stretch this session. Patient able to perform all exercises with no complaints. Physical Therapist Assistant will continue to progress therapeutic exercise, neuromuscular re-education, therapeutic activities, and gait training as able with manual therapy and modalities utilized as needed.    Heidy is progressing well towards her goals.   Pt prognosis is Excellent.     Pt will continue to benefit from skilled outpatient physical therapy to address the deficits listed in the problem list box on initial evaluation, provide pt/family education, and to maximize pt's level of independence in the home and community environment.     Pt's spiritual, cultural, and educational needs considered and pt agreeable to plan of care and goals.     Anticipated barriers to physical therapy: none    Goals:    Short Term Goals: 2 weeks   1. Independent with Home Exercise Program [Met]  2. Increase Left Knee Range of Motion to 0 Degrees to 120 Degrees [Not met: -3 - 106 degrees]  3. Increase Left Knee Strength to grossly 4+/5 [Not met: 4/5 grossly]  4. Patient will ambulate 500 feet with Normal Gait pattern without complaints of pain [Not met: incomplete right terminal knee extension]      Long Term Goals: 6 weeks   1. Patient will increase Left Knee Strength to grossly 5/5 [Not met: 4/5 grossly]  2. Patient will ambulate 1000+ feet with no complaints of pain or weakness in Left Knee [Not met]      Plan     Patient will continue to benefit from skilled physical therapy treatment as prescribed working towards goals listed above to maximize functional potential.       SEYMOUR Ingram  12/4/2023

## 2023-12-06 ENCOUNTER — CLINICAL SUPPORT (OUTPATIENT)
Dept: REHABILITATION | Facility: HOSPITAL | Age: 63
End: 2023-12-06
Payer: COMMERCIAL

## 2023-12-06 DIAGNOSIS — Z96.651 STATUS POST TOTAL RIGHT KNEE REPLACEMENT: Primary | ICD-10-CM

## 2023-12-06 PROCEDURE — 97530 THERAPEUTIC ACTIVITIES: CPT

## 2023-12-06 PROCEDURE — 97110 THERAPEUTIC EXERCISES: CPT

## 2023-12-07 ENCOUNTER — TELEPHONE (OUTPATIENT)
Dept: ORTHOPEDICS | Facility: CLINIC | Age: 63
End: 2023-12-07
Payer: COMMERCIAL

## 2023-12-07 NOTE — TELEPHONE ENCOUNTER
----- Message from Ora Royal sent at 12/7/2023  1:02 PM CST -----  Wants Mike to call [phone]- would not say what its concerning.

## 2023-12-07 NOTE — TELEPHONE ENCOUNTER
Called and spoke to patient. She stated she was calling to make her  an appt.  Scheduled him for 12/11/23 @ 9:30a.m.

## 2023-12-12 ENCOUNTER — CLINICAL SUPPORT (OUTPATIENT)
Dept: REHABILITATION | Facility: HOSPITAL | Age: 63
End: 2023-12-12
Payer: COMMERCIAL

## 2023-12-12 DIAGNOSIS — Z96.651 STATUS POST TOTAL RIGHT KNEE REPLACEMENT: Primary | ICD-10-CM

## 2023-12-12 PROCEDURE — 97530 THERAPEUTIC ACTIVITIES: CPT | Mod: CQ

## 2023-12-12 PROCEDURE — 97110 THERAPEUTIC EXERCISES: CPT | Mod: CQ

## 2023-12-12 PROCEDURE — 97140 MANUAL THERAPY 1/> REGIONS: CPT | Mod: CQ

## 2023-12-12 NOTE — PROGRESS NOTES
[facility]  Physical Therapy Treatment Note    Name: Heidy Moyre  Clinic Number: [id number]    Therapy Diagnosis:   Encounter Diagnosis   Name Primary?    Status post total right knee replacement Yes     Physician: William Bravo MD    Visit Date: 12/12/2023     Physician Orders: PT Eval and Treat  s/p right TKA  Medical Diagnosis from Referral: s/p right TKA 8/31/23  Evaluation Date: 9/27/2023  Authorization Period Expiration:09/24/2024  Plan of Care Expiration: 12/22/2023  Date of Surgery: 8/31/23  Visit # / Visits authorized: 20/23  PTA Visit: 1    Time In: 1011  Time Out: 1059  Total Billable Time: 48 minutes    Precautions: Standard and blood thinners    Subjective     Patient reports: her posterior knee is very sore today and she does not know why; patient states she is doing well otherwise     She was compliant with home exercise program    Pain: 0/10  Location: right knee    Objective     Heidy received therapeutic exercises to develop strength, endurance, ROM, and flexibility for 30 minutes including:    Airdyne bike: 5 minutes  Calf stretch on slant board: x 2 minutes  Right knee flexion stretch on step: x 10 reps with 10 second holds  Right hamstring stretch on step: 3 x 20 second holds  Heel slides (right) with green strap for overpressure: x 15 reps with 5 second holds  Prone hangs: x 5 minutes; 3 LB    Standing hip abduction: red theraband; x 20 reps (not performed)  Long arc quads (right): 4 lbs; x 20 reps (not performed)   Theraball knee flexion rolls with bilateral knee extension between reps x 4 minutes (not performed)    Heidy received the following manual therapy techniques: were applied to the: right knee for 8 minutes, including:    Knee extension stretch (manual): 3 x 30 sec hold  Myofascial release to posterior knee musculature    Heidy participated in neuromuscular re-education activities to improve: Balance for 0 minutes. The following  activities were included:    Right single leg stance without upper extremity support (not performed)   Tandem gait forward/backward on foam beam with least required upper extremity support (not performed)  Side steps on foam beam without upper extremity support (not performed)   Quad sets (right): x 20 reps with 3 second holds (not performed)   Short arc quads (right): 3 lbs; x 20 reps (not performed)  Straight leg raises (right): 1.5 lbs; 2 x 10 reps; verbal and tactile cues for quality of movement and to reduce the extensor lag (not performed)   Prone knee extensions (right): 2 x 10 reps (not performed)     Heidy participated in dynamic functional therapeutic activities to improve functional performance for 10 minutes, including:    Forward step ups on a 6-inch step with least required bilateral upper extremity support: x 20 reps  Lateral step lowers on a 4-inch step with unilateral upper extremity support: 3 x 5 reps    Chair squats: x 2 x 10 reps; verbal cues for equal weight distribution through bilateral lower extremities (not performed)     Heidy participated in gait training to improve functional mobility and safety for 0 minutes, including:    Gait in hallway with emphasis on achieving right terminal knee extension (not performed)     Heidy received the following supervised modalities after being cleared for contradictions: IFC Electrical Stimulation for pain control applied to the right knee. Pt received stimulation at a frequency of 18 V for 0 minutes. Heidy tolderated treatment well without any adverse effects.      Heidy received a cold pack to her right knee for 0 minutes in conjunction with IFC electrical stimulation.     Right Knee AROM Right Knee PROM   Extension -3 degrees -3 degrees   Flexion (Supine) 111 degrees 114 degrees     Right quad la degree    Home Exercises Provided and Patient Education Provided     Education provided: Patient educated on the importance of completing skilled  physical therapy treatment and home exercise program as prescribed to maximize functional gains.    Written Home Exercises Provided: Patient instructed to cont prior HEP. Exercises were reviewed and Heidy was able to demonstrate them prior to the end of the session.  Heidy demonstrated good  understanding of the education provided.     See EMR under Patient Instructions for exercises provided during therapy sessions.    Assessment      Patient with good effort throughout treatment. Patient continues to have incomplete right terminal knee extension (-3 degrees); therefore, prone hangs and myofascial release to posterior knee were performed this session to improve knee extension. Patient able to perform all exercises with no complaints. Physical Therapist Assistant will continue to progress therapeutic exercise, neuromuscular re-education, therapeutic activities, and gait training as able with manual therapy and modalities utilized as needed.    Heidy is progressing well towards her goals.   Pt prognosis is Excellent.     Pt will continue to benefit from skilled outpatient physical therapy to address the deficits listed in the problem list box on initial evaluation, provide pt/family education, and to maximize pt's level of independence in the home and community environment.     Pt's spiritual, cultural, and educational needs considered and pt agreeable to plan of care and goals.     Anticipated barriers to physical therapy: none    Goals:    Short Term Goals: 2 weeks   1. Independent with Home Exercise Program [Met]  2. Increase Left Knee Range of Motion to 0 Degrees to 120 Degrees [Not met: -3 - 106 degrees]  3. Increase Left Knee Strength to grossly 4+/5 [Not met: 4/5 grossly]  4. Patient will ambulate 500 feet with Normal Gait pattern without complaints of pain [Not met: incomplete right terminal knee extension]      Long Term Goals: 6 weeks  1. Patient will increase Left Knee Strength to grossly 5/5 [Not met: 4/5  grossly]  2. Patient will ambulate 1000+ feet with no complaints of pain or weakness in Left Knee [Not met]    Plan     Patient will continue to benefit from skilled physical therapy treatment as prescribed working towards goals listed above to maximize functional potential.       SEYMOUR Ingram  12/12/2023

## 2023-12-14 ENCOUNTER — CLINICAL SUPPORT (OUTPATIENT)
Dept: REHABILITATION | Facility: HOSPITAL | Age: 63
End: 2023-12-14
Payer: COMMERCIAL

## 2023-12-14 DIAGNOSIS — Z96.651 STATUS POST TOTAL RIGHT KNEE REPLACEMENT: Primary | ICD-10-CM

## 2023-12-14 PROCEDURE — 97530 THERAPEUTIC ACTIVITIES: CPT | Mod: CQ

## 2023-12-14 PROCEDURE — 97110 THERAPEUTIC EXERCISES: CPT | Mod: CQ

## 2023-12-18 ENCOUNTER — CLINICAL SUPPORT (OUTPATIENT)
Dept: REHABILITATION | Facility: HOSPITAL | Age: 63
End: 2023-12-18
Payer: COMMERCIAL

## 2023-12-18 DIAGNOSIS — Z96.651 STATUS POST TOTAL RIGHT KNEE REPLACEMENT: Primary | ICD-10-CM

## 2023-12-18 PROCEDURE — 97530 THERAPEUTIC ACTIVITIES: CPT | Mod: CQ

## 2023-12-18 PROCEDURE — 97110 THERAPEUTIC EXERCISES: CPT | Mod: CQ

## 2023-12-18 NOTE — PROGRESS NOTES
[facility]  Physical Therapy Treatment Note    Name: Heidy Moyer  Clinic Number: [id number]    Therapy Diagnosis:   Encounter Diagnosis   Name Primary?    Status post total right knee replacement Yes     Physician: William Bravo MD    Visit Date: 12/18/2023     Physician Orders: PT Eval and Treat  s/p right TKA  Medical Diagnosis from Referral: s/p right TKA 8/31/23  Evaluation Date: 9/27/2023  Authorization Period Expiration:09/24/2024  Plan of Care Expiration: 12/22/2023  Date of Surgery: 8/31/23  Visit # / Visits authorized: 22/23  PTA Visit: 3    Time In: 1019  Time Out: 1101  Total Billable Time: 42 minutes    Precautions: Standard and blood thinners    Subjective     Patient reports: Her knee if feeling better today with no pain on the posterior side.     She was compliant with home exercise program    Pain: 0/10  Location: right knee    Objective     Heidy received therapeutic exercises to develop strength, endurance, ROM, and flexibility for 27 minutes including:    Airdyne bike: 5 minutes  Calf stretch on slant board: x 2 minutes  Right knee flexion stretch on step: x 10 reps with 10 second holds  Right hamstring stretch on step: 3 x 20 second holds  Heel slides (right) with green strap for overpressure: x 15 reps with 5 second holds  Prone hangs: x 5 minutes; 3 LB    Standing hip abduction: red theraband; x 20 reps (not performed)  Long arc quads (right): 4 lbs; x 20 reps (not performed)  Theraball knee flexion rolls with bilateral knee extension between reps x 4 minutes (not performed)    Heidy received the following manual therapy techniques: were applied to the: right knee for 0 minutes, including:     Knee extension stretch (manual): 3 x 30 sec hold  Myofascial release to posterior knee musculature    Heidy participated in neuromuscular re-education activities to improve: Balance for 0 minutes. The following activities were included:    Right single  leg stance without upper extremity support (not performed)   Tandem gait forward/backward on foam beam with least required upper extremity support (not performed)  Side steps on foam beam without upper extremity support (not performed)   Quad sets (right): x 20 reps with 3 second holds (not performed)   Short arc quads (right): 3 lbs; x 20 reps (not performed)  Straight leg raises (right): 1.5 lbs; 2 x 10 reps; verbal and tactile cues for quality of movement and to reduce the extensor lag (not performed)   Prone knee extensions (right): 2 x 10 reps (not performed)     Heidy participated in dynamic functional therapeutic activities to improve functional performance for 15 minutes, including:    Forward step ups on a 6-inch step with least required bilateral upper extremity support: x 20 reps  Lateral step lowers on a 4-inch step with unilateral upper extremity support: 3 x 5 reps  Chair squats: x 2 x 10 reps; verbal cues for equal weight distribution through bilateral lower extremities     Heidy participated in gait training to improve functional mobility and safety for 0 minutes, including:  Gait in hallway with emphasis on achieving right terminal knee extension (not performed)      Right Knee AROM Right Knee PROM   Extension -2 degrees -2 degrees   Flexion (Supine) 111 degrees 116 degrees     Right quad la degree    Home Exercises Provided and Patient Education Provided     Education provided: Patient educated on the importance of completing skilled physical therapy treatment and home exercise program as prescribed to maximize functional gains.    Written Home Exercises Provided: Patient instructed to cont prior HEP. Exercises were reviewed and Heidy was able to demonstrate them prior to the end of the session.  Heidy demonstrated good  understanding of the education provided.     See EMR under Patient Instructions for exercises provided during therapy sessions.    Assessment      Patient with good effort  throughout treatment. Patient able to perform all exercises with no complaints. Patient with slightly improved knee extension/flexion this session as evident by measurements above. Patient with 1 physical therapy appointment left. Physical Therapist Assistant will continue to progress therapeutic exercise, neuromuscular re-education, therapeutic activities, and gait training as able with manual therapy and modalities utilized as needed.     Heidy is progressing well towards her goals.   Pt prognosis is Excellent.     Pt will continue to benefit from skilled outpatient physical therapy to address the deficits listed in the problem list box on initial evaluation, provide pt/family education, and to maximize pt's level of independence in the home and community environment.     Pt's spiritual, cultural, and educational needs considered and pt agreeable to plan of care and goals.     Anticipated barriers to physical therapy: none    Goals:    Short Term Goals: 2 weeks   1. Independent with Home Exercise Program [Met]  2. Increase Left Knee Range of Motion to 0 Degrees to 120 Degrees [Not met: -3 - 106 degrees]  3. Increase Left Knee Strength to grossly 4+/5 [Not met: 4/5 grossly]  4. Patient will ambulate 500 feet with Normal Gait pattern without complaints of pain [Not met: incomplete right terminal knee extension]      Long Term Goals: 6 weeks  1. Patient will increase Left Knee Strength to grossly 5/5 [Not met: 4/5 grossly]  2. Patient will ambulate 1000+ feet with no complaints of pain or weakness in Left Knee [Not met]    Plan     Patient will continue to benefit from skilled physical therapy treatment as prescribed working towards goals listed above to maximize functional potential.       SEYMOUR Ingram  12/12/2023

## 2023-12-27 ENCOUNTER — CLINICAL SUPPORT (OUTPATIENT)
Dept: REHABILITATION | Facility: HOSPITAL | Age: 63
End: 2023-12-27
Payer: COMMERCIAL

## 2023-12-27 DIAGNOSIS — Z96.651 STATUS POST TOTAL RIGHT KNEE REPLACEMENT: Primary | ICD-10-CM

## 2023-12-27 PROCEDURE — 97112 NEUROMUSCULAR REEDUCATION: CPT

## 2023-12-27 PROCEDURE — 97530 THERAPEUTIC ACTIVITIES: CPT

## 2023-12-27 PROCEDURE — 97110 THERAPEUTIC EXERCISES: CPT

## 2023-12-27 NOTE — PLAN OF CARE
[facility]  Physical Therapy Discharge Summary    Name: Heidy Moyer  Clinic Number: [id number]    Therapy Diagnosis:   Encounter Diagnosis   Name Primary?    Status post total right knee replacement Yes     Physician: William Bravo MD    Visit Date: 2023     Physician Orders: PT Eval and Treat  s/p right TKA  Medical Diagnosis from Referral: s/p right TKA 23  Evaluation Date: 2023  Authorization Period Expiration:2024  Plan of Care Expiration: 2023  Date of Surgery: 23  Visit # / Visits authorized:   PTA Visit: 0    Time In: 1020  Time Out: 1100  Total Billable Time: 40 minutes    Precautions: Standard and blood thinners    Subjective     Patient reports: Her right knee is doing well. No pain or issues to report.     She was compliant with home exercise program    Pain: 0/10  Location: right knee    Objective     Heidy received therapeutic exercises to develop strength, endurance, ROM, and flexibility for 15 minutes including:    Airdyne bike: 5 minutes  Calf stretch on slant board: x 2 minutes  Right knee flexion stretch on step: x 10 reps with 10 second holds  Right hamstring stretch (seated): 3 x 20 second holds    Heidy participated in neuromuscular re-education activities to improve: Balance for 20 minutes. The following activities were included:    Right single leg stance on foam without upper extremity support  Tandem gait forward/backward on foam beam with least required upper extremity support  Side steps on foam beam with least required upper extremity support    Heidy participated in dynamic functional therapeutic activities to improve functional performance for 5 minutes, including:    Forward step ups on a 6-inch step with least required bilateral upper extremity support: x 20 reps     Right Knee AROM Right Knee PROM   Extension 0 degrees 0 degrees   Flexion (Supine) 111 degrees 116 degrees     Right quad la  degree    Home Exercises Provided and Patient Education Provided     Education provided: Patient educated on the importance of completing skilled physical therapy treatment and home exercise program as prescribed to maximize functional gains.    Written Home Exercises Provided: Patient instructed to cont prior HEP. Exercises were reviewed and Heidy was able to demonstrate them prior to the end of the session.  Heidy demonstrated good  understanding of the education provided.     See EMR under Patient Instructions for exercises provided during therapy sessions.    Assessment      Patient with good effort throughout treatment. Patient has met a majority of her goals and reached the maximum benefit from physical therapy treatment at the present time. Patient will be discharged with a home exercise program for continued right lower extremity stretching/strengthening. Patient follows-up with Dr. Bravo on 2/5/2024.     Pt's spiritual, cultural, and educational needs considered and pt agreeable to plan of care and goals.     Anticipated barriers to physical therapy: none    Goals:    Short Term Goals: 2 weeks   1. Independent with Home Exercise Program [Met]  2. Increase Left Knee Range of Motion to 0 Degrees to 120 Degrees [Not met: 0 - 116 degrees]  3. Increase Left Knee Strength to grossly 4+/5 [Met  4. Patient will ambulate 500 feet with Normal Gait pattern without complaints of pain [Met]      Long Term Goals: 6 weeks  1. Patient will increase Left Knee Strength to grossly 5/5 [Not met: 4+/5 grossly]  2. Patient will ambulate 1000+ feet with no complaints of pain or weakness in Left Knee [Not met]    Discharge reason: Patient has reached the maximum rehab potential for the present time    Plan     This patient is discharged from Physical Therapy.    Eric Arboleda, PT, DPT  12/27/2023

## 2024-02-02 DIAGNOSIS — M25.562 LEFT KNEE PAIN, UNSPECIFIED CHRONICITY: ICD-10-CM

## 2024-02-02 DIAGNOSIS — Z96.651 STATUS POST TOTAL RIGHT KNEE REPLACEMENT: Primary | ICD-10-CM

## 2024-02-05 ENCOUNTER — HOSPITAL ENCOUNTER (OUTPATIENT)
Dept: RADIOLOGY | Facility: HOSPITAL | Age: 64
Discharge: HOME | End: 2024-02-05
Attending: ORTHOPAEDIC SURGERY
Payer: COMMERCIAL

## 2024-02-05 ENCOUNTER — OFFICE VISIT (OUTPATIENT)
Dept: ORTHOPEDICS | Facility: CLINIC | Age: 64
End: 2024-02-05
Payer: COMMERCIAL

## 2024-02-05 VITALS — BODY MASS INDEX: 39.4 KG/M2 | HEIGHT: 68 IN | WEIGHT: 260 LBS

## 2024-02-05 DIAGNOSIS — M25.562 LEFT KNEE PAIN, UNSPECIFIED CHRONICITY: ICD-10-CM

## 2024-02-05 DIAGNOSIS — Z96.651 STATUS POST TOTAL RIGHT KNEE REPLACEMENT: ICD-10-CM

## 2024-02-05 DIAGNOSIS — Z96.651 STATUS POST TOTAL RIGHT KNEE REPLACEMENT: Primary | ICD-10-CM

## 2024-02-05 PROBLEM — Z96.652 STATUS POST TOTAL KNEE REPLACEMENT USING CEMENT, LEFT: Status: ACTIVE | Noted: 2023-09-25

## 2024-02-05 PROCEDURE — 99213 OFFICE O/P EST LOW 20 MIN: CPT | Mod: PBBFAC | Performed by: ORTHOPAEDIC SURGERY

## 2024-02-05 PROCEDURE — 1159F MED LIST DOCD IN RCRD: CPT | Mod: ,,, | Performed by: ORTHOPAEDIC SURGERY

## 2024-02-05 PROCEDURE — 73564 X-RAY EXAM KNEE 4 OR MORE: CPT | Mod: TC,LT

## 2024-02-05 PROCEDURE — 73564 X-RAY EXAM KNEE 4 OR MORE: CPT | Mod: 26,LT,, | Performed by: ORTHOPAEDIC SURGERY

## 2024-02-05 PROCEDURE — 73560 X-RAY EXAM OF KNEE 1 OR 2: CPT | Mod: 26,RT,, | Performed by: ORTHOPAEDIC SURGERY

## 2024-02-05 PROCEDURE — 99213 OFFICE O/P EST LOW 20 MIN: CPT | Mod: S$PBB,,, | Performed by: ORTHOPAEDIC SURGERY

## 2024-02-05 PROCEDURE — 73560 X-RAY EXAM OF KNEE 1 OR 2: CPT | Mod: TC,RT

## 2024-02-05 PROCEDURE — 3008F BODY MASS INDEX DOCD: CPT | Mod: ,,, | Performed by: ORTHOPAEDIC SURGERY

## 2024-02-05 NOTE — PROGRESS NOTES
Radiology Interpretation        Patient Name: Heidy Moyer  Date: 2/5/2024  YOB: 1960  MRN# [id number]        ORDERING DIAGNOSIS:    Encounter Diagnosis   Name Primary?    Status post total right knee replacement Yes           Two views AP lateral right knee skeletally mature individual total knee arthroplasty in place no loosening fractures or subluxations impression total knee arthroplasty in place right knee no loosening            William Bravo MD

## 2024-02-05 NOTE — PROGRESS NOTES
Patient is here for follow-up of her right total knee arthroplasty.  X-rays show no loosening components.  She is 6 months out.  She is doing well.  Good motion.  Let her weightbear as tolerates.  I will follow up on a yearly basis.

## 2024-02-05 NOTE — PROGRESS NOTES
Radiology Interpretation        Patient Name: Heidy Moyer  Date: 2/5/2024  YOB: 1960  MRN# [id number]        ORDERING DIAGNOSIS:    Encounter Diagnosis   Name Primary?    Status post total right knee replacement Yes           Four views left knee skeletally mature individual there were marked degenerative changes tricompartmental periarticular osteophytes subchondral sclerotic changes narrowing joint space thinning of the articular cartilage near bone-on-bone medial compartment impression severe degenerative changes left knee            William Bravo MD

## 2024-03-25 ENCOUNTER — TELEPHONE (OUTPATIENT)
Dept: ORTHOPEDICS | Facility: CLINIC | Age: 64
End: 2024-03-25
Payer: COMMERCIAL

## 2024-03-25 NOTE — TELEPHONE ENCOUNTER
----- Message from Solange Ward sent at 3/25/2024 12:54 PM CDT -----  Regarding: RETURN CALL  PATIENT CALL AND WOULD LIKE A CALL BACK, CALL BACK NUMBER [phone]

## 2024-03-26 NOTE — TELEPHONE ENCOUNTER
Called patient and she stated she had talked to the billing dept again and they are telling the patient that her surgery was not pre-certified and she has an outstanding balance of $36,000, 5,000 and $4,800. Patient stated she had talked to someone two months ago and they were going to look into it and call her back and she's never heard anything.  Explained to patient that I would get in touch with our prior-auth dept and get them to look into this and I would call her back. Patient voiced understanding.

## 2024-03-26 NOTE — TELEPHONE ENCOUNTER
----- Message from Taryn Calles sent at 3/25/2024  1:10 PM CDT -----  Please call regarding a pre-cert.Call back # [phone]

## 2024-03-28 NOTE — TELEPHONE ENCOUNTER
Called patient and notified her that Scott in [employer] is looking into this and will let our office know.

## 2024-04-02 DIAGNOSIS — Z96.651 STATUS POST TOTAL RIGHT KNEE REPLACEMENT: Primary | ICD-10-CM

## 2024-04-03 ENCOUNTER — HOSPITAL ENCOUNTER (OUTPATIENT)
Dept: RADIOLOGY | Facility: HOSPITAL | Age: 64
Discharge: HOME | End: 2024-04-03
Attending: ORTHOPAEDIC SURGERY
Payer: COMMERCIAL

## 2024-04-03 ENCOUNTER — OFFICE VISIT (OUTPATIENT)
Dept: ORTHOPEDICS | Facility: CLINIC | Age: 64
End: 2024-04-03
Payer: COMMERCIAL

## 2024-04-03 VITALS — WEIGHT: 260 LBS | HEIGHT: 68 IN | BODY MASS INDEX: 39.4 KG/M2

## 2024-04-03 DIAGNOSIS — Z96.651 STATUS POST TOTAL RIGHT KNEE REPLACEMENT: Primary | ICD-10-CM

## 2024-04-03 DIAGNOSIS — Z96.651 STATUS POST TOTAL RIGHT KNEE REPLACEMENT: ICD-10-CM

## 2024-04-03 PROCEDURE — 3008F BODY MASS INDEX DOCD: CPT | Mod: ,,, | Performed by: ORTHOPAEDIC SURGERY

## 2024-04-03 PROCEDURE — 73560 X-RAY EXAM OF KNEE 1 OR 2: CPT | Mod: 26,RT,, | Performed by: ORTHOPAEDIC SURGERY

## 2024-04-03 PROCEDURE — 20610 DRAIN/INJ JOINT/BURSA W/O US: CPT | Mod: PBBFAC,LT | Performed by: ORTHOPAEDIC SURGERY

## 2024-04-03 PROCEDURE — 99213 OFFICE O/P EST LOW 20 MIN: CPT | Mod: S$PBB,25,, | Performed by: ORTHOPAEDIC SURGERY

## 2024-04-03 PROCEDURE — 1159F MED LIST DOCD IN RCRD: CPT | Mod: ,,, | Performed by: ORTHOPAEDIC SURGERY

## 2024-04-03 PROCEDURE — 99213 OFFICE O/P EST LOW 20 MIN: CPT | Mod: PBBFAC,25 | Performed by: ORTHOPAEDIC SURGERY

## 2024-04-03 PROCEDURE — 99999PBSHW PR PBB SHADOW TECHNICAL ONLY FILED TO HB: Mod: JW,PBBFAC,,

## 2024-04-03 PROCEDURE — 4010F ACE/ARB THERAPY RXD/TAKEN: CPT | Mod: ,,, | Performed by: ORTHOPAEDIC SURGERY

## 2024-04-03 PROCEDURE — 73560 X-RAY EXAM OF KNEE 1 OR 2: CPT | Mod: TC,RT

## 2024-04-03 RX ORDER — TRIAMCINOLONE ACETONIDE 40 MG/ML
40 INJECTION, SUSPENSION INTRA-ARTICULAR; INTRAMUSCULAR
Status: DISCONTINUED | OUTPATIENT
Start: 2024-04-03 | End: 2024-04-03 | Stop reason: HOSPADM

## 2024-04-03 RX ORDER — BUPIVACAINE HYDROCHLORIDE 2.5 MG/ML
1 INJECTION, SOLUTION EPIDURAL; INFILTRATION; INTRACAUDAL
Status: DISCONTINUED | OUTPATIENT
Start: 2024-04-03 | End: 2024-04-03 | Stop reason: HOSPADM

## 2024-04-03 RX ADMIN — TRIAMCINOLONE ACETONIDE 40 MG: 40 INJECTION, SUSPENSION INTRA-ARTICULAR; INTRAMUSCULAR at 10:04

## 2024-04-03 RX ADMIN — BUPIVACAINE HYDROCHLORIDE 1 ML: 2.5 INJECTION, SOLUTION EPIDURAL; INFILTRATION; INTRACAUDAL at 10:04

## 2024-04-03 NOTE — PROGRESS NOTES
Radiology Interpretation        Patient Name: eHidy Moyer  Date: 4/3/2024  YOB: 1960  MRN# [id number]        ORDERING DIAGNOSIS:    Encounter Diagnosis   Name Primary?    Status post total right knee replacement Yes      AP lateral right knee skeletally mature individual there is normal mineralization total knee arthroplasty in place no loosening fractures or subluxations no bony lesions impression total knee arthroplasty in place right knee no loosening                 William Bravo MD

## 2024-04-03 NOTE — PROCEDURES
Large Joint Aspiration/Injection: L knee    Date/Time: 4/3/2024 10:00 AM    Performed by: William Bravo MD  Authorized by: William Bravo MD    Consent Done?:  Yes (Verbal)  Indications:  Arthritis    Details:  Needle Size:  22 G  Ultrasonic Guidance for needle placement?: No    Approach:  Anterolateral  Location:  Knee  Site:  L knee  Medications:  1 mL BUPivacaine (PF) 0.25% (2.5 mg/ml) 0.25 % (2.5 mg/mL); 40 mg triamcinolone acetonide 40 mg/mL  Patient tolerance:  Patient tolerated the procedure well with no immediate complications

## 2024-04-03 NOTE — PROGRESS NOTES
Patient is here for follow-up of right total knee arthroplasty right knee is doing well x-rays show no loosening her left knee she is still has a arthritic changes.  I injected her left knee 1 cc Marcaine 1 cc Kenalog let her weightbear as tolerates I will follow back up in 3-4 months.

## 2024-04-23 ENCOUNTER — HOSPITAL ENCOUNTER (OUTPATIENT)
Dept: RADIOLOGY | Facility: HOSPITAL | Age: 64
Discharge: HOME | End: 2024-04-23
Attending: RADIOLOGY
Payer: COMMERCIAL

## 2024-04-23 DIAGNOSIS — R92.8 ABNORMAL MAMMOGRAM: ICD-10-CM

## 2024-04-23 DIAGNOSIS — Z09 FOLLOW-UP EXAM, 3-6 MONTHS SINCE PREVIOUS EXAM: ICD-10-CM

## 2024-04-23 PROCEDURE — 77062 BREAST TOMOSYNTHESIS BI: CPT | Mod: TC

## 2024-04-23 PROCEDURE — 77066 DX MAMMO INCL CAD BI: CPT | Mod: 26,,, | Performed by: RADIOLOGY

## 2024-04-23 PROCEDURE — 77062 BREAST TOMOSYNTHESIS BI: CPT | Mod: 26,,, | Performed by: RADIOLOGY

## 2024-06-24 ENCOUNTER — TELEPHONE (OUTPATIENT)
Dept: ORTHOPEDICS | Facility: CLINIC | Age: 64
End: 2024-06-24
Payer: COMMERCIAL

## 2024-06-24 NOTE — TELEPHONE ENCOUNTER
----- Message from Nakia Kimball sent at 6/24/2024 12:58 PM CDT -----  Who Called: Heidy Moyer    Caller is requesting assistance/information from provider's office.      Preferred Method of Contact: Phone Call  Patient's Preferred Phone Number on File: [phone]   Best Call Back Number, if different: [phone]  Additional Information: calling requesting call from Mike regarding an insurance claim

## 2024-06-24 NOTE — TELEPHONE ENCOUNTER
Called patient and she stated that she received another bill for $5000 and that she called her insurance company and they are telling her that it wasn't a covered benefit because our facility did not get prior authorization. I told her I would get my supervisor to contact the billing dept for [facility] to get this resolved.  She wants to set up up surgery for her other knee but wants this resolved first.

## 2024-10-03 ENCOUNTER — TELEPHONE (OUTPATIENT)
Dept: ORTHOPEDICS | Facility: CLINIC | Age: 64
End: 2024-10-03
Payer: COMMERCIAL

## 2024-10-23 ENCOUNTER — OFFICE VISIT (OUTPATIENT)
Dept: ORTHOPEDICS | Facility: CLINIC | Age: 64
End: 2024-10-23
Payer: MEDICARE

## 2024-10-23 VITALS — WEIGHT: 261 LBS | HEIGHT: 66 IN | BODY MASS INDEX: 41.95 KG/M2

## 2024-10-23 DIAGNOSIS — M25.562 LEFT KNEE PAIN, UNSPECIFIED CHRONICITY: Primary | ICD-10-CM

## 2024-10-23 PROCEDURE — 20610 DRAIN/INJ JOINT/BURSA W/O US: CPT | Mod: PBBFAC | Performed by: ORTHOPAEDIC SURGERY

## 2024-10-23 PROCEDURE — 99999PBSHW PR PBB SHADOW TECHNICAL ONLY FILED TO HB: Mod: PBBFAC,,,

## 2024-10-23 PROCEDURE — 99999 PR PBB SHADOW E&M-EST. PATIENT-LVL III: CPT | Mod: PBBFAC,,, | Performed by: ORTHOPAEDIC SURGERY

## 2024-10-23 PROCEDURE — 99213 OFFICE O/P EST LOW 20 MIN: CPT | Mod: PBBFAC,25 | Performed by: ORTHOPAEDIC SURGERY

## 2024-10-23 RX ORDER — TRIAMCINOLONE ACETONIDE 40 MG/ML
40 INJECTION, SUSPENSION INTRA-ARTICULAR; INTRAMUSCULAR
Status: DISCONTINUED | OUTPATIENT
Start: 2024-10-23 | End: 2024-10-23 | Stop reason: HOSPADM

## 2024-10-23 RX ORDER — BUPIVACAINE HYDROCHLORIDE 2.5 MG/ML
1 INJECTION, SOLUTION EPIDURAL; INFILTRATION; INTRACAUDAL
Status: DISCONTINUED | OUTPATIENT
Start: 2024-10-23 | End: 2024-10-23 | Stop reason: HOSPADM

## 2024-10-23 RX ADMIN — TRIAMCINOLONE ACETONIDE 40 MG: 40 INJECTION, SUSPENSION INTRA-ARTICULAR; INTRAMUSCULAR at 10:10

## 2024-10-23 RX ADMIN — BUPIVACAINE HYDROCHLORIDE 1 ML: 2.5 INJECTION, SOLUTION EPIDURAL; INFILTRATION; INTRACAUDAL at 10:10

## 2024-10-23 NOTE — PROCEDURES
Large Joint Aspiration/Injection: L knee    Date/Time: 10/23/2024 10:30 AM    Performed by: William Bravo MD  Authorized by: William Bravo MD    Consent Done?:  Yes (Verbal)  Indications:  Arthritis    Details:  Needle Size:  22 G  Ultrasonic Guidance for needle placement?: No    Approach:  Anterolateral  Location:  Knee  Site:  L knee  Medications:  1 mL BUPivacaine (PF) 0.25% (2.5 mg/ml) 0.25 % (2.5 mg/mL); 40 mg triamcinolone acetonide 40 mg/mL  Patient tolerance:  Patient tolerated the procedure well with no immediate complications

## 2024-10-23 NOTE — PROGRESS NOTES
Patient is here for follow-up of her left knee arthritic changes.  We discussed treatment options.  She is considering surgery sometime in January.  She wished an injection.  I injected her left knee with 1 cc of Marcaine 1 cc Kenalog.  Let her weightbear as tolerates.  I will follow up in 3 months.

## 2024-10-30 ENCOUNTER — HOSPITAL ENCOUNTER (OUTPATIENT)
Dept: RADIOLOGY | Facility: HOSPITAL | Age: 64
Discharge: HOME | End: 2024-10-30
Attending: RADIOLOGY
Payer: MEDICARE

## 2024-10-30 DIAGNOSIS — Z12.31 VISIT FOR SCREENING MAMMOGRAM: ICD-10-CM

## 2024-10-30 PROCEDURE — 77063 BREAST TOMOSYNTHESIS BI: CPT | Mod: TC

## 2024-10-30 PROCEDURE — 77067 SCR MAMMO BI INCL CAD: CPT | Mod: 26,,, | Performed by: RADIOLOGY

## 2024-10-30 PROCEDURE — 77063 BREAST TOMOSYNTHESIS BI: CPT | Mod: 26,,, | Performed by: RADIOLOGY

## 2024-11-19 NOTE — TELEPHONE ENCOUNTER
Called patient and she stated she was having dental work and wanted to know if she needed to pre-medicate.  Called her dentist, Dr. Gianluca Khan and gave his office the recommended dose 2 grams of amoxicillin 1 hour before procedure.  Notified patient that his office would be calling in her medicine to [facility] [place].     Female

## 2024-12-23 ENCOUNTER — TELEPHONE (OUTPATIENT)
Dept: ORTHOPEDICS | Facility: CLINIC | Age: 64
End: 2024-12-23
Payer: MEDICARE

## 2024-12-23 NOTE — TELEPHONE ENCOUNTER
----- Message from Vidhya sent at 12/23/2024 12:22 PM CST -----  Regarding: SURGERY  Who Called: Heidy Moyer        Who Left Message for Patient:  Does the patient know what this is regarding?:YES      Preferred Method of Contact: Phone Call  Patient's Preferred Phone Number on File: [phone]   Best Call Back Number, if different:  Additional Information: PATIENT SAYS SHE WOULD LIKE TO SCHEDULE FOR LEFT KNEE ON JANUARY 30

## 2024-12-23 NOTE — TELEPHONE ENCOUNTER
Called patient and she stated she wanted to schedule her left TKA on 01/30/25 with Dr. Bravo.  She stated she had an appt with Dr. Bautista on 01/06/25.

## 2025-01-21 DIAGNOSIS — J34.89 NOSE DRYNESS: Primary | ICD-10-CM

## 2025-01-21 RX ORDER — MUPIROCIN 20 MG/G
OINTMENT TOPICAL DAILY
Qty: 15 G | Refills: 1 | Status: SHIPPED | OUTPATIENT
Start: 2025-01-21

## 2025-01-21 NOTE — TELEPHONE ENCOUNTER
Called patient and she wants to proceed with surgery on 01/30/25 for left TKA. She stated that she had seen Dr. Bautista and she had performed a stress test. I explained to her that I would have to get her clearance letter and EKG report from [facility].

## 2025-01-21 NOTE — TELEPHONE ENCOUNTER
----- Message from Karolyn sent at 1/21/2025 10:55 AM CST -----  Regarding: Scheduling Surgery  Who Called: Hiedy Moyer    Patient wants to speak to a nurse about scheduling her knee surgery    Preferred Method of Contact: Phone Call  Patient's Preferred Phone Number on File: [phone]

## 2025-01-23 ENCOUNTER — HOSPITAL ENCOUNTER (OUTPATIENT)
Dept: RADIOLOGY | Facility: HOSPITAL | Age: 65
Discharge: HOME | End: 2025-01-23
Attending: ORTHOPAEDIC SURGERY
Payer: MEDICARE

## 2025-01-23 DIAGNOSIS — Z01.811 PRE-OPERATIVE RESPIRATORY EXAMINATION: ICD-10-CM

## 2025-01-23 DIAGNOSIS — N39.0 URINARY TRACT INFECTION WITHOUT HEMATURIA, SITE UNSPECIFIED: Primary | ICD-10-CM

## 2025-01-23 PROCEDURE — 71046 X-RAY EXAM CHEST 2 VIEWS: CPT | Mod: TC

## 2025-01-23 RX ORDER — SULFAMETHOXAZOLE AND TRIMETHOPRIM 800; 160 MG/1; MG/1
1 TABLET ORAL 2 TIMES DAILY
Qty: 10 TABLET | Refills: 0 | Status: SHIPPED | OUTPATIENT
Start: 2025-01-23

## 2025-01-23 NOTE — TELEPHONE ENCOUNTER
Called patient and notified her that she needs to start Bactrim for 5 days and then repeat her U/A. She voiced understanding and stated she uses [facility].

## 2025-01-23 NOTE — TELEPHONE ENCOUNTER
----- Message from William Bravo MD sent at 1/23/2025  3:19 PM CST -----  Patient has positive leukocyte esterase.  Treat with Bactrim DS 1 p.o. b.i.d. 10.

## 2025-01-29 PROBLEM — M17.12 ARTHRITIS OF LEFT KNEE: Status: ACTIVE | Noted: 2025-01-29

## 2025-01-29 NOTE — H&P
[facility] - [facility] Periop Services  Orthopedics  H&P    Patient Name: Heidy Moyer  MRN: [id number]  Admission Date: (Not on file)  Primary Care Provider: Nakia Camarillo FNP    Patient information was obtained from patient and past medical records.     Subjective:     Principal Problem:<principal problem not specified>    Chief Complaint: No chief complaint on file.       HPI: 64-year-old female who has severe degenerative joint disease left knee she has undergone a previous right total knee arthroplasty.  She is walking with a cane for greater than 3 months at risk for falls needing total knee arthroplasty on left  Left lower extremity she moves her toes has sensation to touch has palpable pulses.  She is tender palpation over the medial and lateral joint line.  Some crepitus on motion.  Range motion 5-95 degrees.  No instability in the knee noted.  There is 1+ effusion.  X-rays show severe tricompartmental degenerative changes left knee  Impression severe degenerative joint disease left knee  Plan total knee arthroplasty on left with or without patellar resurfacing    Past Medical History:   Diagnosis Date    Atrial fibrillation     Hyperlipidemia     Hypertension        Past Surgical History:   Procedure Laterality Date    ARTHROPLASTY, KNEE, TOTAL, USING COMPUTER-ASSISTED NAVIGATION Right 2023    Procedure: ARTHROPLASTY, KNEE, TOTAL, USING COMPUTER-ASSISTED NAVIGATION;  Surgeon: William Bravo MD;  Location: [facility];  Service: Orthopedics;  Laterality: Right;  CRYO CUFF    BREAST BIOPSY Right      SECTION      x2    CHOLECYSTECTOMY      EYE SURGERY Left     clogged eye duct surgery (bypass)    HYSTERECTOMY      OOPHORECTOMY      TUBAL LIGATION         Review of patient's allergies indicates:   Allergen Reactions    Lovastatin Other (See Comments)    Pravastatin Other (See Comments)       No current facility-administered medications for this encounter.      Current Outpatient Medications   Medication Sig    calcium carbonate/vitamin D3 (CALTRATE 600 + D ORAL) Take 1 tablet by mouth 2 (two) times a day.    carvediloL (COREG) 25 MG tablet Take 1 tablet (25 mg total) by mouth 2 (two) times daily.    cyanocobalamin, vitamin B-12, 5,000 mcg TbDL Take 1 tablet by mouth Daily.    diltiaZEM (CARDIZEM CD) 240 MG 24 hr capsule Take 1 capsule (240 mg total) by mouth once daily.    fenofibrate (TRICOR) 145 MG tablet Take 1 tablet (145 mg total) by mouth once daily.    hydroCHLOROthiazide (HYDRODIURIL) 12.5 MG Tab Take 2 tablets (25 mg total) by mouth once daily.    loratadine (CLARITIN) 10 mg tablet Take 10 mg by mouth once daily.    magnesium chloride (MAG 64 ORAL) Take 1 tablet by mouth 2 (two) times a day.    multivit/folic acid/vit K1 (ONE-A-DAY WOMEN'S 50 PLUS ORAL) Take 1 tablet by mouth Daily.    mupirocin (BACTROBAN) 2 % ointment Apply topically once daily.    olmesartan (BENICAR) 40 MG tablet Take 1 tablet (40 mg total) by mouth once daily.    rosuvastatin (CRESTOR) 20 MG tablet Take 1 tablet (20 mg total) by mouth once daily.    sulfamethoxazole-trimethoprim 800-160mg (BACTRIM DS) 800-160 mg Tab Take 1 tablet by mouth 2 (two) times daily.    warfarin (COUMADIN) 5 MG tablet Take 1 tablet (5 mg total) by mouth Daily. Take 1 tab daily and 0.5 tab every Wed and Sun (Patient taking differently: Take 5 mg by mouth Daily. Take 1 tab daily and 0.5 tab every Mon and Wed)     Family History       Problem Relation (Age of Onset)    Breast cancer Sister, Sister, Sister    Heart attack Father    Heart disease Mother          Tobacco Use    Smoking status: Never    Smokeless tobacco: Never   Substance and Sexual Activity    Alcohol use: Never    Drug use: Never    Sexual activity: Not on file     Review of Systems   Constitutional: Negative for decreased appetite.   HENT:  Negative for congestion and ear discharge.    Eyes:  Negative for blurred vision.   Cardiovascular:  Negative  for chest pain and syncope.   Respiratory:  Negative for cough and wheezing.    Endocrine: Negative for cold intolerance and polyuria.   Hematologic/Lymphatic: Negative for adenopathy and bleeding problem.   Skin:  Negative for color change, nail changes and suspicious lesions.   Musculoskeletal:  Positive for joint pain and joint swelling. Negative for muscle cramps and myalgias.   Gastrointestinal:  Negative for bloating and abdominal pain.   Genitourinary:  Negative for frequency and hematuria.   Neurological:  Negative for brief paralysis, sensory change and weakness.   Psychiatric/Behavioral:  Negative for altered mental status.    Allergic/Immunologic: Negative for hives.     Objective:     Vital Signs (Most Recent):    Vital Signs (24h Range):              There is no height or weight on file to calculate BMI.    No intake or output data in the 24 hours ending 01/29/25 1353                 Left Knee Exam     Inspection   Effusion: present    Tenderness   The patient tender to palpation of the medial joint line and lateral joint line.    Crepitus   The patient has crepitus of the medial joint line.    Other   Sensation: normal    Vascular Exam       Left Pulses  Dorsalis Pedis:      2+             Significant Labs: All pertinent labs within the past 24 hours have been reviewed.    Significant Imaging: I have reviewed and interpreted all pertinent imaging results/findings.  Assessment/Plan:     No notes have been filed under this hospital service.  Service: Orthopedic Surgery      William Bravo MD  Orthopedics  [facility]

## 2025-01-29 NOTE — SUBJECTIVE & OBJECTIVE
Past Medical History:   Diagnosis Date    Atrial fibrillation     Hyperlipidemia     Hypertension        Past Surgical History:   Procedure Laterality Date    ARTHROPLASTY, KNEE, TOTAL, USING COMPUTER-ASSISTED NAVIGATION Right 2023    Procedure: ARTHROPLASTY, KNEE, TOTAL, USING COMPUTER-ASSISTED NAVIGATION;  Surgeon: William Bravo MD;  Location: [facility];  Service: Orthopedics;  Laterality: Right;  CRYO CUFF    BREAST BIOPSY Right      SECTION      x2    CHOLECYSTECTOMY      EYE SURGERY Left     clogged eye duct surgery (bypass)    HYSTERECTOMY      OOPHORECTOMY      TUBAL LIGATION         Review of patient's allergies indicates:   Allergen Reactions    Lovastatin Other (See Comments)    Pravastatin Other (See Comments)       No current facility-administered medications for this encounter.     Current Outpatient Medications   Medication Sig    calcium carbonate/vitamin D3 (CALTRATE 600 + D ORAL) Take 1 tablet by mouth 2 (two) times a day.    carvediloL (COREG) 25 MG tablet Take 1 tablet (25 mg total) by mouth 2 (two) times daily.    cyanocobalamin, vitamin B-12, 5,000 mcg TbDL Take 1 tablet by mouth Daily.    diltiaZEM (CARDIZEM CD) 240 MG 24 hr capsule Take 1 capsule (240 mg total) by mouth once daily.    fenofibrate (TRICOR) 145 MG tablet Take 1 tablet (145 mg total) by mouth once daily.    hydroCHLOROthiazide (HYDRODIURIL) 12.5 MG Tab Take 2 tablets (25 mg total) by mouth once daily.    loratadine (CLARITIN) 10 mg tablet Take 10 mg by mouth once daily.    magnesium chloride (MAG 64 ORAL) Take 1 tablet by mouth 2 (two) times a day.    multivit/folic acid/vit K1 (ONE-A-DAY WOMEN'S 50 PLUS ORAL) Take 1 tablet by mouth Daily.    mupirocin (BACTROBAN) 2 % ointment Apply topically once daily.    olmesartan (BENICAR) 40 MG tablet Take 1 tablet (40 mg total) by mouth once daily.    rosuvastatin (CRESTOR) 20 MG tablet Take 1 tablet (20 mg total) by mouth once daily.     sulfamethoxazole-trimethoprim 800-160mg (BACTRIM DS) 800-160 mg Tab Take 1 tablet by mouth 2 (two) times daily.    warfarin (COUMADIN) 5 MG tablet Take 1 tablet (5 mg total) by mouth Daily. Take 1 tab daily and 0.5 tab every Wed and Sun (Patient taking differently: Take 5 mg by mouth Daily. Take 1 tab daily and 0.5 tab every Mon and Wed)     Family History       Problem Relation (Age of Onset)    Breast cancer Sister, Sister, Sister    Heart attack Father    Heart disease Mother          Tobacco Use    Smoking status: Never    Smokeless tobacco: Never   Substance and Sexual Activity    Alcohol use: Never    Drug use: Never    Sexual activity: Not on file     Review of Systems   Constitutional: Negative for decreased appetite.   HENT:  Negative for congestion and ear discharge.    Eyes:  Negative for blurred vision.   Cardiovascular:  Negative for chest pain and syncope.   Respiratory:  Negative for cough and wheezing.    Endocrine: Negative for cold intolerance and polyuria.   Hematologic/Lymphatic: Negative for adenopathy and bleeding problem.   Skin:  Negative for color change, nail changes and suspicious lesions.   Musculoskeletal:  Positive for joint pain and joint swelling. Negative for muscle cramps and myalgias.   Gastrointestinal:  Negative for bloating and abdominal pain.   Genitourinary:  Negative for frequency and hematuria.   Neurological:  Negative for brief paralysis, sensory change and weakness.   Psychiatric/Behavioral:  Negative for altered mental status.    Allergic/Immunologic: Negative for hives.     Objective:     Vital Signs (Most Recent):    Vital Signs (24h Range):              There is no height or weight on file to calculate BMI.    No intake or output data in the 24 hours ending 01/29/25 1353                 Left Knee Exam     Inspection   Effusion: present    Tenderness   The patient tender to palpation of the medial joint line and lateral joint line.    Crepitus   The patient has  crepitus of the medial joint line.    Other   Sensation: normal    Vascular Exam       Left Pulses  Dorsalis Pedis:      2+             Significant Labs: All pertinent labs within the past 24 hours have been reviewed.    Significant Imaging: I have reviewed and interpreted all pertinent imaging results/findings.

## 2025-01-29 NOTE — HPI
64-year-old female who has severe degenerative joint disease left knee she has undergone a previous right total knee arthroplasty.  She is walking with a cane for greater than 3 months at risk for falls needing total knee arthroplasty on left  Left lower extremity she moves her toes has sensation to touch has palpable pulses.  She is tender palpation over the medial and lateral joint line.  Some crepitus on motion.  Range motion 5-95 degrees.  No instability in the knee noted.  There is 1+ effusion.  X-rays show severe tricompartmental degenerative changes left knee  Impression severe degenerative joint disease left knee  Plan total knee arthroplasty on left with or without patellar resurfacing

## 2025-01-30 ENCOUNTER — ANESTHESIA EVENT (OUTPATIENT)
Dept: SURGERY | Facility: HOSPITAL | Age: 65
End: 2025-01-30
Payer: MEDICARE

## 2025-01-30 ENCOUNTER — ANESTHESIA (OUTPATIENT)
Dept: SURGERY | Facility: HOSPITAL | Age: 65
End: 2025-01-30
Payer: MEDICARE

## 2025-01-30 ENCOUNTER — HOSPITAL ENCOUNTER (OUTPATIENT)
Facility: HOSPITAL | Age: 65
Discharge: HOME HEALTH | End: 2025-01-31
Attending: ORTHOPAEDIC SURGERY | Admitting: ORTHOPAEDIC SURGERY
Payer: MEDICARE

## 2025-01-30 DIAGNOSIS — N18.31 CKD STAGE 3A, GFR 45-59 ML/MIN: Primary | ICD-10-CM

## 2025-01-30 DIAGNOSIS — M17.12 ARTHRITIS OF LEFT KNEE: ICD-10-CM

## 2025-01-30 LAB
ANION GAP SERPL CALCULATED.3IONS-SCNC: 11 MMOL/L (ref 7–16)
BASOPHILS # BLD AUTO: 0.04 K/UL (ref 0–0.2)
BASOPHILS NFR BLD AUTO: 0.7 % (ref 0–1)
BUN SERPL-MCNC: 25 MG/DL (ref 10–20)
BUN/CREAT SERPL: 16 (ref 6–20)
CALCIUM SERPL-MCNC: 8.2 MG/DL (ref 8.4–10.2)
CHLORIDE SERPL-SCNC: 113 MMOL/L (ref 98–107)
CO2 SERPL-SCNC: 18 MMOL/L (ref 23–31)
CREAT SERPL-MCNC: 1.53 MG/DL (ref 0.55–1.02)
DIFFERENTIAL METHOD BLD: ABNORMAL
EGFR (NO RACE VARIABLE) (RUSH/TITUS): 38 ML/MIN/1.73M2
EOSINOPHIL # BLD AUTO: 0.08 K/UL (ref 0–0.5)
EOSINOPHIL NFR BLD AUTO: 1.3 % (ref 1–4)
ERYTHROCYTE [DISTWIDTH] IN BLOOD BY AUTOMATED COUNT: 14 % (ref 11.5–14.5)
GLUCOSE SERPL-MCNC: 142 MG/DL (ref 82–115)
HCT VFR BLD AUTO: 36.8 % (ref 38–47)
HGB BLD-MCNC: 11.8 G/DL (ref 12–16)
IMM GRANULOCYTES # BLD AUTO: 0.04 K/UL (ref 0–0.04)
IMM GRANULOCYTES NFR BLD: 0.7 % (ref 0–0.4)
INDIRECT COOMBS: NORMAL
INR BLD: 1.37
LYMPHOCYTES # BLD AUTO: 1.37 K/UL (ref 1–4.8)
LYMPHOCYTES NFR BLD AUTO: 22.3 % (ref 27–41)
MCH RBC QN AUTO: 31.5 PG (ref 27–31)
MCHC RBC AUTO-ENTMCNC: 32.1 G/DL (ref 32–36)
MCV RBC AUTO: 98.1 FL (ref 80–96)
MONOCYTES # BLD AUTO: 0.28 K/UL (ref 0–0.8)
MONOCYTES NFR BLD AUTO: 4.6 % (ref 2–6)
MPC BLD CALC-MCNC: 11.5 FL (ref 9.4–12.4)
NEUTROPHILS # BLD AUTO: 4.33 K/UL (ref 1.8–7.7)
NEUTROPHILS NFR BLD AUTO: 70.4 % (ref 53–65)
NRBC # BLD AUTO: 0 X10E3/UL
NRBC, AUTO (.00): 0 %
PLATELET # BLD AUTO: 238 K/UL (ref 150–400)
POTASSIUM SERPL-SCNC: 4.9 MMOL/L (ref 3.5–5.1)
PROTHROMBIN TIME: 16.7 SECONDS (ref 11.7–14.7)
RBC # BLD AUTO: 3.75 M/UL (ref 4.2–5.4)
RH BLD: NORMAL
SODIUM SERPL-SCNC: 137 MMOL/L (ref 136–145)
SPECIMEN OUTDATE: NORMAL
WBC # BLD AUTO: 6.14 K/UL (ref 4.5–11)

## 2025-01-30 PROCEDURE — 63600175 PHARM REV CODE 636 W HCPCS: Performed by: ORTHOPAEDIC SURGERY

## 2025-01-30 PROCEDURE — 37000008 HC ANESTHESIA 1ST 15 MINUTES: Performed by: ORTHOPAEDIC SURGERY

## 2025-01-30 PROCEDURE — 27000221 HC OXYGEN, UP TO 24 HOURS

## 2025-01-30 PROCEDURE — 27000177 HC AIRWAY, LARYNGEAL MASK: Performed by: ANESTHESIOLOGY

## 2025-01-30 PROCEDURE — 85610 PROTHROMBIN TIME: CPT | Performed by: ORTHOPAEDIC SURGERY

## 2025-01-30 PROCEDURE — 80048 BASIC METABOLIC PNL TOTAL CA: CPT | Performed by: ORTHOPAEDIC SURGERY

## 2025-01-30 PROCEDURE — 63600175 PHARM REV CODE 636 W HCPCS: Mod: TB,JZ | Performed by: ANESTHESIOLOGY

## 2025-01-30 PROCEDURE — C1769 GUIDE WIRE: HCPCS | Performed by: ORTHOPAEDIC SURGERY

## 2025-01-30 PROCEDURE — 25000003 PHARM REV CODE 250: Performed by: ANESTHESIOLOGY

## 2025-01-30 PROCEDURE — 86850 RBC ANTIBODY SCREEN: CPT | Performed by: ORTHOPAEDIC SURGERY

## 2025-01-30 PROCEDURE — 71000039 HC RECOVERY, EACH ADD'L HOUR: Performed by: ORTHOPAEDIC SURGERY

## 2025-01-30 PROCEDURE — 99900035 HC TECH TIME PER 15 MIN (STAT)

## 2025-01-30 PROCEDURE — 36000712 HC OR TIME LEV V 1ST 15 MIN: Performed by: ORTHOPAEDIC SURGERY

## 2025-01-30 PROCEDURE — 94799 UNLISTED PULMONARY SVC/PX: CPT

## 2025-01-30 PROCEDURE — 63600175 PHARM REV CODE 636 W HCPCS

## 2025-01-30 PROCEDURE — D9220A PRA ANESTHESIA: Mod: ANES,,, | Performed by: ANESTHESIOLOGY

## 2025-01-30 PROCEDURE — C1713 ANCHOR/SCREW BN/BN,TIS/BN: HCPCS | Performed by: ORTHOPAEDIC SURGERY

## 2025-01-30 PROCEDURE — 25000003 PHARM REV CODE 250: Performed by: NURSE ANESTHETIST, CERTIFIED REGISTERED

## 2025-01-30 PROCEDURE — 99900031 HC PATIENT EDUCATION (STAT)

## 2025-01-30 PROCEDURE — 64447 NJX AA&/STRD FEMORAL NRV IMG: CPT | Mod: 59,LT,, | Performed by: ANESTHESIOLOGY

## 2025-01-30 PROCEDURE — 27200750 HC INSULATED NEEDLE/ STIMUPLEX: Performed by: ANESTHESIOLOGY

## 2025-01-30 PROCEDURE — 37000009 HC ANESTHESIA EA ADD 15 MINS: Performed by: ORTHOPAEDIC SURGERY

## 2025-01-30 PROCEDURE — 85025 COMPLETE CBC W/AUTO DIFF WBC: CPT | Performed by: ORTHOPAEDIC SURGERY

## 2025-01-30 PROCEDURE — 71000033 HC RECOVERY, INTIAL HOUR: Performed by: ORTHOPAEDIC SURGERY

## 2025-01-30 PROCEDURE — 97165 OT EVAL LOW COMPLEX 30 MIN: CPT

## 2025-01-30 PROCEDURE — C1776 JOINT DEVICE (IMPLANTABLE): HCPCS | Performed by: ORTHOPAEDIC SURGERY

## 2025-01-30 PROCEDURE — 97161 PT EVAL LOW COMPLEX 20 MIN: CPT

## 2025-01-30 PROCEDURE — 25000003 PHARM REV CODE 250: Performed by: ORTHOPAEDIC SURGERY

## 2025-01-30 PROCEDURE — 20985 CPTR-ASST DIR MS PX: CPT | Mod: ,,, | Performed by: ORTHOPAEDIC SURGERY

## 2025-01-30 PROCEDURE — 27000510 HC BLANKET BAIR HUGGER ANY SIZE: Performed by: ANESTHESIOLOGY

## 2025-01-30 PROCEDURE — 27000716 HC OXISENSOR PROBE, ANY SIZE: Performed by: ANESTHESIOLOGY

## 2025-01-30 PROCEDURE — 27201960 HC SPINAL TRAY: Performed by: ANESTHESIOLOGY

## 2025-01-30 PROCEDURE — 63600175 PHARM REV CODE 636 W HCPCS: Performed by: NURSE ANESTHETIST, CERTIFIED REGISTERED

## 2025-01-30 PROCEDURE — 27201423 OPTIME MED/SURG SUP & DEVICES STERILE SUPPLY: Performed by: ORTHOPAEDIC SURGERY

## 2025-01-30 PROCEDURE — D9220A PRA ANESTHESIA: Mod: CRNA,,, | Performed by: NURSE ANESTHETIST, CERTIFIED REGISTERED

## 2025-01-30 PROCEDURE — 27447 TOTAL KNEE ARTHROPLASTY: CPT | Mod: LT,,, | Performed by: ORTHOPAEDIC SURGERY

## 2025-01-30 PROCEDURE — 36000713 HC OR TIME LEV V EA ADD 15 MIN: Performed by: ORTHOPAEDIC SURGERY

## 2025-01-30 PROCEDURE — 36415 COLL VENOUS BLD VENIPUNCTURE: CPT | Mod: 91 | Performed by: ORTHOPAEDIC SURGERY

## 2025-01-30 DEVICE — PRIMARY TIBIAL BASEPLATE
Type: IMPLANTABLE DEVICE | Site: KNEE | Status: FUNCTIONAL
Brand: TRIATHLON

## 2025-01-30 DEVICE — ASYMMETRIC PATELLA
Type: IMPLANTABLE DEVICE | Site: KNEE | Status: FUNCTIONAL
Brand: TRIATHLON

## 2025-01-30 DEVICE — TIBIAL BEARING INSERT - CS
Type: IMPLANTABLE DEVICE | Site: KNEE | Status: FUNCTIONAL
Brand: TRIATHLON

## 2025-01-30 DEVICE — CRUCIATE RETAINING FEMORAL
Type: IMPLANTABLE DEVICE | Site: KNEE | Status: FUNCTIONAL
Brand: TRIATHLON

## 2025-01-30 DEVICE — CEMENT BONE SURG SMPLX P RADPQ: Type: IMPLANTABLE DEVICE | Site: KNEE | Status: FUNCTIONAL

## 2025-01-30 RX ORDER — SODIUM CHLORIDE 9 MG/ML
INJECTION, SOLUTION INTRAVENOUS CONTINUOUS
Status: DISCONTINUED | OUTPATIENT
Start: 2025-01-30 | End: 2025-01-30

## 2025-01-30 RX ORDER — EPINEPHRINE 1 MG/ML
INJECTION, SOLUTION, CONCENTRATE INTRAVENOUS
Status: DISCONTINUED | OUTPATIENT
Start: 2025-01-30 | End: 2025-01-30

## 2025-01-30 RX ORDER — MORPHINE SULFATE 4 MG/ML
4 INJECTION, SOLUTION INTRAMUSCULAR; INTRAVENOUS EVERY 4 HOURS PRN
Status: DISCONTINUED | OUTPATIENT
Start: 2025-01-30 | End: 2025-01-31 | Stop reason: HOSPADM

## 2025-01-30 RX ORDER — ATORVASTATIN CALCIUM 10 MG/1
10 TABLET, FILM COATED ORAL DAILY
Status: DISCONTINUED | OUTPATIENT
Start: 2025-01-30 | End: 2025-01-30

## 2025-01-30 RX ORDER — TRANEXAMIC ACID 100 MG/ML
INJECTION, SOLUTION INTRAVENOUS
Status: DISCONTINUED | OUTPATIENT
Start: 2025-01-30 | End: 2025-01-30

## 2025-01-30 RX ORDER — LOSARTAN POTASSIUM 25 MG/1
25 TABLET ORAL DAILY
Status: DISCONTINUED | OUTPATIENT
Start: 2025-01-31 | End: 2025-01-31 | Stop reason: HOSPADM

## 2025-01-30 RX ORDER — HYDROCHLOROTHIAZIDE 25 MG/1
25 TABLET ORAL DAILY
Status: DISCONTINUED | OUTPATIENT
Start: 2025-01-31 | End: 2025-01-31 | Stop reason: HOSPADM

## 2025-01-30 RX ORDER — WARFARIN SODIUM 5 MG/1
5 TABLET ORAL DAILY
Status: DISCONTINUED | OUTPATIENT
Start: 2025-01-30 | End: 2025-01-30

## 2025-01-30 RX ORDER — DIPHENHYDRAMINE HYDROCHLORIDE 50 MG/ML
25 INJECTION INTRAMUSCULAR; INTRAVENOUS EVERY 6 HOURS PRN
Status: DISCONTINUED | OUTPATIENT
Start: 2025-01-30 | End: 2025-01-30 | Stop reason: HOSPADM

## 2025-01-30 RX ORDER — FENOFIBRATE 145 MG/1
145 TABLET, FILM COATED ORAL DAILY
Status: DISCONTINUED | OUTPATIENT
Start: 2025-01-30 | End: 2025-01-30

## 2025-01-30 RX ORDER — OXYCODONE HYDROCHLORIDE 5 MG/1
5 TABLET ORAL
Status: DISCONTINUED | OUTPATIENT
Start: 2025-01-30 | End: 2025-01-30 | Stop reason: HOSPADM

## 2025-01-30 RX ORDER — DILTIAZEM HYDROCHLORIDE 240 MG/1
240 CAPSULE, COATED, EXTENDED RELEASE ORAL DAILY
Status: DISCONTINUED | OUTPATIENT
Start: 2025-01-31 | End: 2025-01-31 | Stop reason: HOSPADM

## 2025-01-30 RX ORDER — MORPHINE SULFATE 10 MG/ML
4 INJECTION INTRAMUSCULAR; INTRAVENOUS; SUBCUTANEOUS EVERY 5 MIN PRN
Status: DISCONTINUED | OUTPATIENT
Start: 2025-01-30 | End: 2025-01-30 | Stop reason: HOSPADM

## 2025-01-30 RX ORDER — CEFAZOLIN 2 G/1
2 INJECTION, POWDER, FOR SOLUTION INTRAMUSCULAR; INTRAVENOUS
Status: DISCONTINUED | OUTPATIENT
Start: 2025-01-30 | End: 2025-01-30 | Stop reason: HOSPADM

## 2025-01-30 RX ORDER — HYDROMORPHONE HYDROCHLORIDE 2 MG/ML
0.5 INJECTION, SOLUTION INTRAMUSCULAR; INTRAVENOUS; SUBCUTANEOUS EVERY 5 MIN PRN
Status: DISCONTINUED | OUTPATIENT
Start: 2025-01-30 | End: 2025-01-30 | Stop reason: HOSPADM

## 2025-01-30 RX ORDER — CALCIUM CHLORIDE INJECTION 100 MG/ML
1 INJECTION, SOLUTION INTRAVENOUS ONCE
Status: COMPLETED | OUTPATIENT
Start: 2025-01-30 | End: 2025-01-30

## 2025-01-30 RX ORDER — SODIUM CHLORIDE 9 MG/ML
INJECTION, SOLUTION INTRAVENOUS CONTINUOUS
Status: DISCONTINUED | OUTPATIENT
Start: 2025-01-30 | End: 2025-01-31 | Stop reason: HOSPADM

## 2025-01-30 RX ORDER — ONDANSETRON HYDROCHLORIDE 2 MG/ML
4 INJECTION, SOLUTION INTRAVENOUS EVERY 8 HOURS PRN
Status: DISCONTINUED | OUTPATIENT
Start: 2025-01-30 | End: 2025-01-31 | Stop reason: HOSPADM

## 2025-01-30 RX ORDER — FENTANYL CITRATE 50 UG/ML
INJECTION, SOLUTION INTRAMUSCULAR; INTRAVENOUS
Status: DISCONTINUED | OUTPATIENT
Start: 2025-01-30 | End: 2025-01-30

## 2025-01-30 RX ORDER — WARFARIN SODIUM 5 MG/1
5 TABLET ORAL DAILY
Status: DISCONTINUED | OUTPATIENT
Start: 2025-01-30 | End: 2025-01-31 | Stop reason: HOSPADM

## 2025-01-30 RX ORDER — FENOFIBRATE 145 MG/1
145 TABLET, FILM COATED ORAL NIGHTLY
Status: DISCONTINUED | OUTPATIENT
Start: 2025-01-30 | End: 2025-01-31 | Stop reason: HOSPADM

## 2025-01-30 RX ORDER — DEXAMETHASONE SODIUM PHOSPHATE 4 MG/ML
INJECTION, SOLUTION INTRA-ARTICULAR; INTRALESIONAL; INTRAMUSCULAR; INTRAVENOUS; SOFT TISSUE
Status: DISCONTINUED | OUTPATIENT
Start: 2025-01-30 | End: 2025-01-30

## 2025-01-30 RX ORDER — DOCUSATE SODIUM 100 MG/1
100 CAPSULE, LIQUID FILLED ORAL EVERY 12 HOURS
Status: DISCONTINUED | OUTPATIENT
Start: 2025-01-30 | End: 2025-01-31 | Stop reason: HOSPADM

## 2025-01-30 RX ORDER — ATORVASTATIN CALCIUM 10 MG/1
10 TABLET, FILM COATED ORAL NIGHTLY
Status: DISCONTINUED | OUTPATIENT
Start: 2025-01-30 | End: 2025-01-31 | Stop reason: HOSPADM

## 2025-01-30 RX ORDER — SODIUM CHLORIDE, SODIUM LACTATE, POTASSIUM CHLORIDE, CALCIUM CHLORIDE 600; 310; 30; 20 MG/100ML; MG/100ML; MG/100ML; MG/100ML
125 INJECTION, SOLUTION INTRAVENOUS CONTINUOUS
Status: DISCONTINUED | OUTPATIENT
Start: 2025-01-30 | End: 2025-01-31

## 2025-01-30 RX ORDER — MIDAZOLAM HYDROCHLORIDE 1 MG/ML
INJECTION INTRAMUSCULAR; INTRAVENOUS
Status: DISCONTINUED | OUTPATIENT
Start: 2025-01-30 | End: 2025-01-30

## 2025-01-30 RX ORDER — BUPIVACAINE HYDROCHLORIDE 7.5 MG/ML
INJECTION, SOLUTION EPIDURAL; RETROBULBAR
Status: COMPLETED | OUTPATIENT
Start: 2025-01-30 | End: 2025-01-30

## 2025-01-30 RX ORDER — KETOROLAC TROMETHAMINE 30 MG/ML
INJECTION, SOLUTION INTRAMUSCULAR; INTRAVENOUS
Status: DISCONTINUED | OUTPATIENT
Start: 2025-01-30 | End: 2025-01-30

## 2025-01-30 RX ORDER — HYDROCODONE BITARTRATE AND ACETAMINOPHEN 5; 325 MG/1; MG/1
1 TABLET ORAL EVERY 4 HOURS PRN
Status: DISCONTINUED | OUTPATIENT
Start: 2025-01-30 | End: 2025-01-31 | Stop reason: HOSPADM

## 2025-01-30 RX ORDER — CEFAZOLIN SODIUM 1 G/3ML
INJECTION, POWDER, FOR SOLUTION INTRAMUSCULAR; INTRAVENOUS
Status: DISCONTINUED | OUTPATIENT
Start: 2025-01-30 | End: 2025-01-30

## 2025-01-30 RX ORDER — PHENYLEPHRINE HYDROCHLORIDE 10 MG/ML
INJECTION INTRAVENOUS
Status: DISCONTINUED | OUTPATIENT
Start: 2025-01-30 | End: 2025-01-30

## 2025-01-30 RX ORDER — PROPOFOL 10 MG/ML
VIAL (ML) INTRAVENOUS
Status: DISCONTINUED | OUTPATIENT
Start: 2025-01-30 | End: 2025-01-30

## 2025-01-30 RX ORDER — LIDOCAINE HYDROCHLORIDE 20 MG/ML
INJECTION, SOLUTION EPIDURAL; INFILTRATION; INTRACAUDAL; PERINEURAL
Status: DISCONTINUED | OUTPATIENT
Start: 2025-01-30 | End: 2025-01-30

## 2025-01-30 RX ORDER — IPRATROPIUM BROMIDE AND ALBUTEROL SULFATE 2.5; .5 MG/3ML; MG/3ML
3 SOLUTION RESPIRATORY (INHALATION)
Status: DISCONTINUED | OUTPATIENT
Start: 2025-01-30 | End: 2025-01-30 | Stop reason: HOSPADM

## 2025-01-30 RX ORDER — CEFAZOLIN 2 G/1
2 INJECTION, POWDER, FOR SOLUTION INTRAMUSCULAR; INTRAVENOUS
Status: COMPLETED | OUTPATIENT
Start: 2025-01-30 | End: 2025-01-31

## 2025-01-30 RX ORDER — ONDANSETRON HYDROCHLORIDE 2 MG/ML
INJECTION, SOLUTION INTRAVENOUS
Status: DISCONTINUED | OUTPATIENT
Start: 2025-01-30 | End: 2025-01-30

## 2025-01-30 RX ORDER — CARVEDILOL 25 MG/1
25 TABLET ORAL 2 TIMES DAILY
Status: DISCONTINUED | OUTPATIENT
Start: 2025-01-30 | End: 2025-01-31 | Stop reason: HOSPADM

## 2025-01-30 RX ORDER — ROPIVACAINE HYDROCHLORIDE 7.5 MG/ML
INJECTION, SOLUTION EPIDURAL; PERINEURAL
Status: COMPLETED | OUTPATIENT
Start: 2025-01-30 | End: 2025-01-30

## 2025-01-30 RX ORDER — BISACODYL 10 MG/1
10 SUPPOSITORY RECTAL DAILY PRN
Status: DISCONTINUED | OUTPATIENT
Start: 2025-01-30 | End: 2025-01-31 | Stop reason: HOSPADM

## 2025-01-30 RX ORDER — ONDANSETRON HYDROCHLORIDE 2 MG/ML
4 INJECTION, SOLUTION INTRAVENOUS DAILY PRN
Status: DISCONTINUED | OUTPATIENT
Start: 2025-01-30 | End: 2025-01-30 | Stop reason: HOSPADM

## 2025-01-30 RX ORDER — CETIRIZINE HYDROCHLORIDE 10 MG/1
10 TABLET ORAL DAILY
Status: DISCONTINUED | OUTPATIENT
Start: 2025-01-30 | End: 2025-01-31 | Stop reason: HOSPADM

## 2025-01-30 RX ADMIN — DOCUSATE SODIUM 100 MG: 100 CAPSULE, LIQUID FILLED ORAL at 09:01

## 2025-01-30 RX ADMIN — PHENYLEPHRINE HYDROCHLORIDE 100 MCG: 10 INJECTION INTRAVENOUS at 10:01

## 2025-01-30 RX ADMIN — PHENYLEPHRINE HYDROCHLORIDE 100 MCG: 10 INJECTION INTRAVENOUS at 08:01

## 2025-01-30 RX ADMIN — CALCIUM CHLORIDE 1 G: 100 INJECTION INTRAVENOUS; INTRAVENTRICULAR at 11:01

## 2025-01-30 RX ADMIN — CARVEDILOL 25 MG: 25 TABLET, FILM COATED ORAL at 08:01

## 2025-01-30 RX ADMIN — PROPOFOL 150 MG: 10 INJECTION, EMULSION INTRAVENOUS at 08:01

## 2025-01-30 RX ADMIN — SODIUM CHLORIDE 1000 MG: 9 INJECTION, SOLUTION INTRAVENOUS at 08:01

## 2025-01-30 RX ADMIN — HYDROCODONE BITARTRATE AND ACETAMINOPHEN 1 TABLET: 5; 325 TABLET ORAL at 06:01

## 2025-01-30 RX ADMIN — EPINEPHRINE 0.1 MCG: 1 INJECTION, SOLUTION, CONCENTRATE INTRAVENOUS at 08:01

## 2025-01-30 RX ADMIN — PHENYLEPHRINE HYDROCHLORIDE 100 MCG: 10 INJECTION INTRAVENOUS at 09:01

## 2025-01-30 RX ADMIN — KETOROLAC TROMETHAMINE 30 MG: 30 INJECTION, SOLUTION INTRAMUSCULAR at 08:01

## 2025-01-30 RX ADMIN — ONDANSETRON 4 MG: 2 INJECTION INTRAMUSCULAR; INTRAVENOUS at 07:01

## 2025-01-30 RX ADMIN — SODIUM CHLORIDE: 9 INJECTION, SOLUTION INTRAVENOUS at 02:01

## 2025-01-30 RX ADMIN — TRANEXAMIC ACID 1000 MG: 100 INJECTION, SOLUTION INTRAVENOUS at 10:01

## 2025-01-30 RX ADMIN — SODIUM CHLORIDE: 9 INJECTION, SOLUTION INTRAVENOUS at 11:01

## 2025-01-30 RX ADMIN — TRANEXAMIC ACID 1000 MG: 100 INJECTION, SOLUTION INTRAVENOUS at 08:01

## 2025-01-30 RX ADMIN — SODIUM CHLORIDE: 9 INJECTION, SOLUTION INTRAVENOUS at 12:01

## 2025-01-30 RX ADMIN — FENOFIBRATE 145 MG: 145 TABLET ORAL at 08:01

## 2025-01-30 RX ADMIN — MORPHINE SULFATE 4 MG: 4 INJECTION, SOLUTION INTRAMUSCULAR; INTRAVENOUS at 02:01

## 2025-01-30 RX ADMIN — VANCOMYCIN HYDROCHLORIDE 1000 MG: 1 INJECTION, POWDER, LYOPHILIZED, FOR SOLUTION INTRAVENOUS at 08:01

## 2025-01-30 RX ADMIN — WARFARIN SODIUM 5 MG: 5 TABLET ORAL at 06:01

## 2025-01-30 RX ADMIN — ATORVASTATIN CALCIUM 10 MG: 10 TABLET, FILM COATED ORAL at 08:01

## 2025-01-30 RX ADMIN — CEFAZOLIN 2 G: 1 INJECTION, POWDER, FOR SOLUTION INTRAMUSCULAR; INTRAVENOUS; PARENTERAL at 08:01

## 2025-01-30 RX ADMIN — PROPOFOL 50 MG: 10 INJECTION, EMULSION INTRAVENOUS at 07:01

## 2025-01-30 RX ADMIN — HYDROCODONE BITARTRATE AND ACETAMINOPHEN 1 TABLET: 5; 325 TABLET ORAL at 10:01

## 2025-01-30 RX ADMIN — LIDOCAINE HYDROCHLORIDE 60 MG: 20 INJECTION, SOLUTION INTRAVENOUS at 08:01

## 2025-01-30 RX ADMIN — MIDAZOLAM HYDROCHLORIDE 2 MG: 1 INJECTION, SOLUTION INTRAMUSCULAR; INTRAVENOUS at 07:01

## 2025-01-30 RX ADMIN — FENTANYL CITRATE 100 MCG: 50 INJECTION, SOLUTION INTRAMUSCULAR; INTRAVENOUS at 07:01

## 2025-01-30 RX ADMIN — DEXAMETHASONE SODIUM PHOSPHATE 4 MG: 4 INJECTION, SOLUTION INTRA-ARTICULAR; INTRALESIONAL; INTRAMUSCULAR; INTRAVENOUS; SOFT TISSUE at 08:01

## 2025-01-30 RX ADMIN — BUPIVACAINE HYDROCHLORIDE 1.25 ML: 7.5 INJECTION, SOLUTION EPIDURAL; RETROBULBAR at 08:01

## 2025-01-30 RX ADMIN — SODIUM CHLORIDE: 9 INJECTION, SOLUTION INTRAVENOUS at 08:01

## 2025-01-30 RX ADMIN — CETIRIZINE HYDROCHLORIDE 10 MG: 10 TABLET, FILM COATED ORAL at 02:01

## 2025-01-30 RX ADMIN — CEFAZOLIN 2 G: 2 INJECTION, POWDER, FOR SOLUTION INTRAMUSCULAR; INTRAVENOUS at 06:01

## 2025-01-30 RX ADMIN — ROPIVACAINE HYDROCHLORIDE 15 ML: 7.5 INJECTION, SOLUTION EPIDURAL; PERINEURAL at 08:01

## 2025-01-30 NOTE — PLAN OF CARE
[facility] - Orthopedic  Initial Discharge Assessment       Primary Care Provider: Nakia Camarillo FNP    Admission Diagnosis: Left knee pain, unspecified chronicity [M25.562]  Arthritis of left knee [M17.12]    Admission Date: 1/30/2025  Expected Discharge Date:     Transition of Care Barriers: None    Payor: [employer] [facility] MCARE / Plan: Any[employer] MEDICARE ADVANTAGE / Product Type: Medicare Advantage /     Extended Emergency Contact Information  Primary Emergency Contact: erickson hassan jr  Mobile Phone: [phone]  Relation: Spouse  Preferred language: English   needed? No    Discharge Plan A: Home with family, Home Health  Discharge Plan B: Home, Home with family, Home Health, Long-term acute care facility (LTAC), Rehab, Skilled Nursing Facility      [facility] [address] [address]  Phone: [phone] Fax: [phone]    [facility]  [address] [place] [address]  Phone: [phone] Fax: [phone]      Initial Assessment (most recent)       Adult Discharge Assessment - 01/30/25 1502          Discharge Assessment    Assessment Type Discharge Planning Assessment     Confirmed/corrected address, phone number and insurance Yes     Confirmed Demographics Correct on Facesheet     Source of Information patient;family     People in Home spouse     Do you expect to return to your current living situation? Yes     Do you have help at home or someone to help you manage your care at home? Yes     Who are your caregiver(s) and their phone number(s)? Erickson Hassan, , [phone]     Prior to hospitilization cognitive status: Alert/Oriented     Current cognitive status: Alert/Oriented     Walking or Climbing Stairs Difficulty no     Dressing/Bathing Difficulty no     Home Accessibility stairs to enter home     Number of Stairs, Main Entrance two     Stair Railings, Main  Entrance none     Home Layout Able to live on 1st floor     Equipment Currently Used at Home walker, rolling     Readmission within 30 days? No     Patient currently being followed by outpatient case management? No     Do you currently have service(s) that help you manage your care at home? No     Do you take prescription medications? Yes     Do you have prescription coverage? Yes     Coverage [facility] Medicare     Do you have any problems affording any of your prescribed medications? No     Is the patient taking medications as prescribed? yes     Who is going to help you get home at discharge? Sherman Moyer, , [phone]     How do you get to doctors appointments? family or friend will provide;car, drives self     Are you on dialysis? No     Do you take coumadin? Yes     Who monitors your labs? Dr. Cabrera     Discharge Plan A Home with family;Home Health     Discharge Plan B Home;Home with family;Home Health;Long-term acute care facility (LTAC);Rehab;Skilled Nursing Facility     DME Needed Upon Discharge  none     Discharge Plan discussed with: Patient;Spouse/sig other     Name(s) and Number(s) Sherman Moyer, , [phone]     Transition of Care Barriers None        Physical Activity    On average, how many days per week do you engage in moderate to strenuous exercise (like a brisk walk)? 0 days     On average, how many minutes do you engage in exercise at this level? 0 min        Financial Resource Strain    How hard is it for you to pay for the very basics like food, housing, medical care, and heating? Not hard at all        Housing Stability    In the last 12 months, was there a time when you were not able to pay the mortgage or rent on time? No     At any time in the past 12 months, were you homeless or living in a shelter (including now)? No        Transportation Needs    Has the lack of transportation kept you from medical appointments, meetings, work or from getting things needed for  daily living? No        Food Insecurity    Within the past 12 months, you worried that your food would run out before you got the money to buy more. Never true     Within the past 12 months, the food you bought just didn't last and you didn't have money to get more. Never true        Stress    Do you feel stress - tense, restless, nervous, or anxious, or unable to sleep at night because your mind is troubled all the time - these days? Not at all        Social Isolation    How often do you feel lonely or isolated from those around you?  Never        Alcohol Use    Q1: How often do you have a drink containing alcohol? Never     Q2: How many drinks containing alcohol do you have on a typical day when you are drinking? Patient does not drink     Q3: How often do you have six or more drinks on one occasion? Never        Utilities    In the past 12 months has the electric, gas, oil, or water company threatened to shut off services in your home? No        Health Literacy    How often do you need to have someone help you when you read instructions, pamphlets, or other written material from your doctor or pharmacy? Rarely        OTHER    Name(s) of People in Home Sherman Moyer, , [phone]                   SW spoke with pt and  at the bedside. Pt lives at home with  and plans to return home when medically ready for d/c. Pt has required DME at home. Not current with HH. SDOH completed. SS following.

## 2025-01-30 NOTE — TRANSFER OF CARE
"Anesthesia Transfer of Care Note    Patient: Heidy Moyer    Procedure(s) Performed: Procedure(s) (LRB):  ARTHROPLASTY, KNEE, TOTAL, USING COMPUTER-ASSISTED NAVIGATION (Left)    Patient location: PACU    Anesthesia Type: general    Transport from OR: Transported from OR on 6-10 L/min O2 by face mask with adequate spontaneous ventilation    Post pain: adequate analgesia    Post assessment: no apparent anesthetic complications    Post vital signs: stable    Level of consciousness: sedated    Nausea/Vomiting: no nausea/vomiting    Complications: none    Transfer of care protocol was followed    Last vitals: Visit Vitals  BP (!) 95/57   Pulse 63   Temp 36.6 °C (97.8 °F)   Resp 12   Ht 5' 8" (1.727 m)   Wt 115.7 kg (255 lb)   SpO2 95%   Breastfeeding No   BMI 38.77 kg/m²     "

## 2025-01-30 NOTE — PLAN OF CARE
Problem: Physical Therapy  Goal: Physical Therapy Goal  Description: Short term goals:  Pt will perform supine to sit with contact guard assistance  Pt will perform sit to stand with contact guard assistance  Pt will demonstrate left knee flexion range of motion from 0 to 90 degrees  Pt will ambulate 100 ft with contact guard assistanceusing rolling walker  Pt will ascend/descend 3 steps using left handrail with contact guard assistance    Long term goals:  Pt will perform supine to sit with modified independence  Pt will perform sit to stand with modified independence  Pt will ambulate 300 ft with modified independence using rolling walker        Outcome: Progressing

## 2025-01-30 NOTE — INTERVAL H&P NOTE
The patient has been examined and the H&P has been reviewed:    I concur with the findings and no changes have occurred since H&P was written.    Surgery risks, benefits and alternative options discussed and understood by patient/family.          Active Hospital Problems    Diagnosis  POA    *Arthritis of left knee [M17.12]  Yes      Resolved Hospital Problems   No resolved problems to display.

## 2025-01-30 NOTE — OP NOTE
[facility] - Orthopedic Periop Services  General Surgery  Operative Note    SUMMARY     Date of Procedure: 1/30/2025     Procedure: Procedure(s) (LRB):  ARTHROPLASTY, KNEE, TOTAL, USING COMPUTER-ASSISTED NAVIGATION (Left)       Surgeons and Role:     * William Bravo MD - Primary    Assisting Surgeon: None    Pre-Operative Diagnosis: Left knee pain, unspecified chronicity [M25.562]    Post-Operative Diagnosis: Post-Op Diagnosis Codes:     * Left knee pain, unspecified chronicity [M25.562]    Anesthesia: General    Operative Findings (including complications, if any):         OPERATIVE REPORT    SURGERY DATE:  1/30/2025    PRE-OP DIAGNOSIS:  Left knee pain, unspecified chronicity [M25.562]    POST-OP DIAGNOSIS:  Post-Op Diagnosis Codes:     * Left knee pain, unspecified chronicity [M25.562]    PROCEDURE:  Procedure(s) (LRB):  ARTHROPLASTY, KNEE, TOTAL, USING COMPUTER-ASSISTED NAVIGATION (Left)    SURGEON:  William Bravo M.D.    Assisting Surgeon:      ANESTHESIA:  General    BLOOD LOSS:  100cc    TOURNIQUET TIME:  68min    COMPLICATIONS:  None.    IMPLANTS PLACED:    Implant Name Type Inv. Item Serial No.  Lot No. LRB No. Used Action   CEMENT BONE SURG SMPLX P RADPQ - [id number]  CEMENT BONE SURG SMPLX P RADPQ  [employer]AN [employer]. [id number] Left 1 Implanted   CEMENT BONE SURG SMPLX P RADPQ - [id number]  CEMENT BONE SURG SMPLX P RADPQ  [employer]. [id number] Left 1 Implanted   GUIDEPIN 3.20MM 4 [employer]ERSON SQUARE - [id number]  GUIDEPIN 3.20MM 4 GERSON [facility])  Left 1 Implanted and Explanted   GUIDEPIN 3.20MM 4 [employer] [place] - [id number]  GUIDEPIN 3.20MM 4 [facility] ([facility])  Left 1 Implanted and Explanted   PATELLA TRI 32X10 X3 POLYETHYL - [id number]  PATELLA TRI 32X10 X3 POLYETHYL  XU[employer] IRAS[employer]. [id number] Left 1 Implanted   FEMORAL CRUC RTN EDGARDO SZ 4 LEFT - [id number]  FEMORAL CRUC RTN EDGARDO SZ 4 LEFT  XUAN [employer] IRAS[employer]. PARDU Left 1 Implanted   BASEPLATE TIB  EDGARDO PRIM SZ 4 - [id number]  BASEPLATE TIB EDGARDO PRIM SZ 4  XUAN[employer] IRASEMA. [id number] Left 1 Implanted   INSERT TIBIAL CS SIZE 4 9MM - [id number]  INSERT TIBIAL CS SIZE 4 9MM  XUAN[employer] IRASEMA. [id number] Left 1 Implanted       INDICATIONS:  Patient is a 64 y.o. year old female with severe degenerative joint disease of the left knee needing total knee arthroplasty.    PROCEDURE IN DETAIL:  After having the risks and benefits of the procedure explained at length to the patient and the patient stating that she understands the risks and benefits of the procedure and wishes to proceed with the procedure, written informed consent was obtained.  The patient was taken to the Operating room and placed on the Operative table at which time General was induced per anesthesia.  The patient then had a sandbag taped on the bed so the knee could be held at 90 degrees of flexion.  A tourniquet was placed over cast padding on the proximal left thigh.  The left lower extremity was prepped and draped in sterile fashion.  It was elevated and exsanguinated with an Esmarch bandage.    At this time a 15 centimeter incision was made centered over the patella going from the tibial tubercle mid-line to approximately 4-5 centimeters superior to the superior pole of the patella.  Skin incision was made with a #10 blade and was taken down to the anterior retinaculum of the patella.  At this point a medial flap was elevated up using the #10 blade.  At this point the knee was flexed up and the quadriceps tendon was identified and a medial arthrotomy starting approximately a centimeter superior to the superior pole of the patella and going over the media border of the patella and the medial border of the patella going down to the tibial tubercle was made using a #10 blade. At this point the joint opened up and there was noted to be severe DJD of the knee.  The knee was then flexed up.  The remnants of the medial and lateral meniscus were removed,  leaving minimal meniscus remaining.  The osteophytes off of the femur were removed using a rongeur.    The ASM navigation block was pinned on to the femur.  The cutting block was then positioned and aligned based on the computer alignment.  It was pinned in place and the distal femoral cut was made. Next the tibial guide was placed at the ASM block pinned to the tibia.  The cutting block was in position based on the computer navigation.  Cutting block pin place and the tibial cut was made.  At this point the knee was noted to be balanced with proper flexion and extension gaps.  The sizing block was then placed onto the distal femur.  It was pinned in place setting the 30 degrees of external rotation.  At this point the cutting block was noted to be a size 4 based off the sizer.  The sizer was removed.  The cutting block was placed.  The anterior cut followed by the anterior chamfer posterior cut followed by the posterior chamfer cut was made.  Trial femur was placed.  Trial tibial tray was placed.  It was a size 4 with a trial 9 millimeter poly.  The knee was noted to be balanced at this point.  The tibia was then prepped placing the tibial plate guide onto the proximal tibia.  It was then punched.  It was then punched comparing the size 4 tibial tray.  Once this was done the distal femur  holes were drilled based off of the trial femoral component.  At this point all trial components were removed.  The patella was resurfaced removing 9 millimeter of bone.  A 32 millimeter all polyethylene patella was inserted and peg holes drilled.  The knee was put through range of motion.  The patella was noted to track appropriately with good alignment and was felt to be stable.  The wounds were irrigated out with copious amounts of normal saline.  The tibia was cemented first removing excess cement followed by the femur.  The cement was allowed to polymerize and the tibial insert was placed.  The knee was irrigated out  with copious amounts of normal saline.  The knee was noted to be tracking in the correct position and alignment with good motion.     At this point two medium Hemovac drains were placed in the joint coming out through the skin.  Figure of eight #1 Vicryl interrupted sutures were used to close the arthrotomy. Subcuticular stitches of 2-0 Vicryl followed by skin staples were used to closed the skin.  A sterile occlusive dressing was placed followed by a Cryo-Cuff.  The patient was then taken from the Operative table and taken to the Recovery Room in good condition.  All counts are correct. There were no complications.         Description of Technical Procedures:     Significant Surgical Tasks Conducted by the Assistant(s), if Applicable:     Estimated Blood Loss (EBL): * No values recorded between 1/30/2025  8:53 AM and 1/30/2025 10:27 AM *100cc           Implants:   Implant Name Type Inv. Item Serial No.  Lot No. LRB No. Used Action   CEMENT BONE SURG SMPLX P RADPQ - [id number]  CEMENT BONE SURG SMPLX P RADPQ  XUAN [employer] IRASEMA. [id number] Left 1 Implanted   CEMENT BONE SURG SMPLX P RADPQ - [id number]  CEMENT BONE SURG SMPLX P RADPQ  XUAN [employer] IRASEMA. [id number] Left 1 Implanted   GUIDEPIN 3.20MM 4 [employer]ERSON SQUARE - [id number]  GUIDEPIN 3.20MM 4 GERSON [facility]  Left 1 Implanted and Explanted   GUIDEPIN 3.20MM 4 [employer] [place] - [id number]  GUIDEPIN 3.20MM 4 [facility] ([facility])  Left 1 Implanted and Explanted   PATELLA TRI 32X10 X3 POLYETHYL - [id number]  PATELLA TRI 32X10 X3 POLYETHYL  XUAN [employer] IRASEMA. [id number] Left 1 Implanted   FEMORAL CRUC RTN EDGARDO SZ 4 LEFT - [id number]  FEMORAL CRUC RTN EDGARDO SZ 4 LEFT  XUAN [employer] IRASEMA. PARDU Left 1 Implanted   BASEPLATE TIB EDGARDO PRIM SZ 4 - [id number]  BASEPLATE TIB EDGARDO PRIM SZ 4  XUAN [employer] IRASEMA. [id number] Left 1 Implanted   INSERT TIBIAL CS SIZE 4 9MM - [id number]  INSERT TIBIAL CS SIZE 4 9MM  [employer]ASEMA. [id number] Left 1  Implanted       Specimens:   Specimen (24h ago, onward)      None                    Condition: Good    Disposition: PACU - hemodynamically stable.    Attestation: I was present and scrubbed for the entire procedure.

## 2025-01-30 NOTE — PLAN OF CARE
Problem: Occupational Therapy  Goal: Occupational Therapy Goal  Description: ST.Pt will perform bathing with Beverley with setup at EOB  2.Pt will perform UE dressing with Joanne  3.Pt will perform LE dressing with Beverley  4.Pt will transfer bed/chair/bsc with CGA with RW  5.Pt will perform standing task x 2 min with CGA with RW  6.Tolerate 15 min of tx without fatigue.      LTG:   Restore to max I with selfcare and mobility.      Outcome: Progressing

## 2025-01-30 NOTE — ANESTHESIA PREPROCEDURE EVALUATION
01/30/2025  Heidy Moyer is a 64 y.o., female.      Pre-op Assessment    I have reviewed the Patient Summary Reports.     I have reviewed the Nursing Notes. I have reviewed the NPO Status.   I have reviewed the Medications.     Review of Systems  Anesthesia Hx:  No problems with previous Anesthesia                Social:  Non-Smoker, No Alcohol Use       Hematology/Oncology:  Hematology Normal   Oncology Normal                Hematology Comments: Coumadin stopped wk ago                    EENT/Dental:  EENT/Dental Normal           Cardiovascular:     Hypertension    Dysrhythmias atrial fibrillation      hyperlipidemia    AF - coumadin                           Pulmonary:  Pulmonary Normal        Suspected sleep apnea               Renal/:  Renal/ Normal                 Hepatic/GI:  Hepatic/GI Normal                    Musculoskeletal:  Musculoskeletal Normal                Neurological:  Neurology Normal                                      Endocrine:  Endocrine Normal          Morbid Obesity / BMI > 40  Dermatological:  Skin Normal    Psych:  Psychiatric Normal                    Physical Exam  General: Well nourished    Airway:  Mallampati: III / III  Mouth Opening: Normal  TM Distance: > 6 cm  Tongue: Normal  Neck ROM: Normal ROM    Chest/Lungs:  Clear to auscultation, Normal Respiratory Rate    Heart:  Rate: Normal  Rhythm: Regular Rhythm        Anesthesia Plan  Type of Anesthesia, risks & benefits discussed:    Anesthesia Type: Gen Supraglottic Airway, Spinal, Regional  Intra-op Monitoring Plan: Standard ASA Monitors  Post Op Pain Control Plan: multimodal analgesia  Induction:  IV  Informed Consent: Informed consent signed with the Patient and all parties understand the risks and agree with anesthesia plan.  All questions answered. Patient consented to blood products? Yes  ASA Score: 3  Day  of Surgery Review of History & Physical: H&P Update referred to the surgeon/provider.I have interviewed and examined the patient. I have reviewed the patient's H&P dated:     Ready For Surgery From Anesthesia Perspective.     .      Chemistry        Component Value Date/Time     01/23/2025 1350    K 4.1 01/23/2025 1350     01/23/2025 1350    CO2 30 01/23/2025 1350    BUN 22 (H) 01/23/2025 1350    CREATININE 1.16 (H) 01/23/2025 1350     01/23/2025 1350        Component Value Date/Time    CALCIUM 9.9 01/23/2025 1350    ALKPHOS 51 01/23/2025 1350    AST 24 01/23/2025 1350    ALT 14 01/23/2025 1350    BILITOT 0.6 01/23/2025 1350        Lab Results   Component Value Date    WBC 7.29 01/23/2025    HGB 13.7 01/23/2025    HCT 42.7 01/23/2025     01/23/2025     No results found for this or any previous visit.

## 2025-01-30 NOTE — ANESTHESIA POSTPROCEDURE EVALUATION
Anesthesia Post Evaluation    Patient: Heidy Moyer    Procedure(s) Performed: Procedure(s) (LRB):  ARTHROPLASTY, KNEE, TOTAL, USING COMPUTER-ASSISTED NAVIGATION (Left)    Final Anesthesia Type: general      Patient location during evaluation: PACU  Patient participation: Yes- Able to Participate  Level of consciousness: awake and sedated  Post-procedure vital signs: reviewed and stable  Pain management: adequate  Airway patency: patent    PONV status at discharge: No PONV  Anesthetic complications: no      Cardiovascular status: blood pressure returned to baseline  Respiratory status: unassisted  Hydration status: euvolemic  Follow-up not needed.              Vitals Value Taken Time   BP 81/55 01/30/25 1135   Temp 36.6 °C (97.8 °F) 01/30/25 1110   Pulse 74 01/30/25 1135   Resp 16 01/30/25 1135   SpO2 94 % 01/30/25 1135   Vitals shown include unfiled device data.      No case tracking events are documented in the log.      Pain/Little Score: Little Score: 8 (1/30/2025 11:15 AM)

## 2025-01-30 NOTE — PT/OT/SLP EVAL
Physical Therapy Evaluation     Patient Name: Heidy Moyer   MRN: [id number]  Recent Surgery: Procedure(s) (LRB):  ARTHROPLASTY, KNEE, TOTAL, USING COMPUTER-ASSISTED NAVIGATION (Left) Day of Surgery    Recommendations:     Discharge Recommendations: Low Intensity Therapy   Discharge Equipment Recommendations: none   Barriers to discharge: None    Assessment:     Heidy Moyer is a 64 y.o. female admitted with a medical diagnosis of Arthritis of left knee. She presents with the following impairments/functional limitations: impaired functional mobility, gait instability, decreased lower extremity function, pain. Pt has mild pain s/p total knee replacement. Some lightheadedness with standing but able to take a few steps. Has all needed equipment for home.     Rehab Prognosis: Good; patient would benefit from acute PT services to address these deficits and reach maximum level of function.    Plan:     During this hospitalization, patient to be seen BID (5x/wk, daily 2x/wk) to address the above listed problems via gait training, therapeutic activities, therapeutic exercises, neuromuscular re-education    Plan of Care Expires: 02/28/25    Subjective     Chief Complaint: left total knee replacement   Patient Comments/Goals: Pt is agreeable to PT   Pain/Comfort:  Pain Rating 1: 7/10  Location - Side 1: Left  Location 1: knee  Pain Addressed 1: Pre-medicate for activity  Pain Rating Post-Intervention 1: 7/10    Social History:  Living Environment: Patient lives with their spouse in a single story home with number of outside stair(s): 3  Prior Level of Function: Prior to admission, patient was independent  Equipment Used at Home: walker, rolling  DME owned (not currently used): none  Assistance Upon Discharge: significant other    Objective:     Communicated with RN prior to session. Patient found HOB elevated with blood pressure cuff, hemovac, peripheral IV, knee immobilizer, pulse ox (continuous), SCD,  oxygen upon PT entry to room.    General Precautions: Standard, fall   Orthopedic Precautions: LLE weight bearing as tolerated   Braces: Knee immobilizer    Respiratory Status: Nasal cannula, flow 2 L/min    Exams:  Cognition: Patient is oriented to Person, Place, Time, Situation  RLE ROM: WFL  RLE Strength: WFL  LLE ROM:  in knee immobilizer  LLE Strength:  3/5  Gross Motor Coordination: WFL    Functional Mobility:  Gait belt applied - Yes  Bed Mobility  Scooting: contact guard assistance  Supine to Sit: minimum assistance for LE management  Sit to Supine: minimum assistance for LE management  Transfers  Sit to Stand: minimum assistance with rolling walker  Gait  Patient ambulated 5 ft with rolling walker and minimum assistance. Patient demonstrates occasional unsteady gait. . All lines remained intact throughout ambulation trail.  Balance  Sitting: contact guard assistance  Standing: contact guard assistance      Therapeutic Activities and Exercises:   Patient educated on role of acute care PT and PT POC, safety while in hospital including calling nurse for mobility, and call light usage      AM-PAC 6 CLICK MOBILITY  Total Score:18    Patient left HOB elevated with all lines intact and call button in reach.    GOALS:   Multidisciplinary Problems       Physical Therapy Goals          Problem: Physical Therapy    Goal Priority Disciplines Outcome Interventions   Physical Therapy Goal     PT, PT/OT Progressing    Description: Short term goals:  Pt will perform supine to sit with contact guard assistance  Pt will perform sit to stand with contact guard assistance  Pt will demonstrate left knee flexion range of motion from 0 to 90 degrees  Pt will ambulate 100 ft with contact guard assistanceusing rolling walker  Pt will ascend/descend 3 steps using left handrail with contact guard assistance    Long term goals:  Pt will perform supine to sit with modified independence  Pt will perform sit to stand with modified  independence  Pt will ambulate 300 ft with modified independence using rolling walker                             DME Justifications:  No DME recommended requiring DME justifications    History:     Past Medical History:   Diagnosis Date    Atrial fibrillation     Hyperlipidemia     Hypertension        Past Surgical History:   Procedure Laterality Date    ARTHROPLASTY, KNEE, TOTAL, USING COMPUTER-ASSISTED NAVIGATION Right 2023    Procedure: ARTHROPLASTY, KNEE, TOTAL, USING COMPUTER-ASSISTED NAVIGATION;  Surgeon: William Bravo MD;  Location: [facility];  Service: Orthopedics;  Laterality: Right;  CRYO CUFF    BREAST BIOPSY Right      SECTION      x2    CHOLECYSTECTOMY      EYE SURGERY Left     clogged eye duct surgery (bypass)    HYSTERECTOMY      OOPHORECTOMY      TUBAL LIGATION         Time Tracking:     PT Received On: 25  PT Start Time: 1517  PT Stop Time: 1535  PT Total Time (min): 18 min     Billable Minutes: Evaluation low complexity    2025

## 2025-01-30 NOTE — ANESTHESIA PROCEDURE NOTES
Peripheral Block    Patient location during procedure: OR   Block not for primary anesthetic.  Reason for block: at surgeon's request and post-op pain management   Post-op Pain Location: lt knee pain   Start time: 1/30/2025 8:08 AM  Timeout: 1/30/2025 8:07 AM   End time: 1/30/2025 8:13 AM    Staffing  Authorizing Provider: Shan Diaz MD  Performing Provider: Shan Diaz MD    Staffing  Performed by: Shan Diaz MD  Authorized by: Shan Diaz MD    Preanesthetic Checklist  Completed: patient identified, IV checked, site marked, risks and benefits discussed, surgical consent, monitors and equipment checked, pre-op evaluation and timeout performed  Peripheral Block  Patient position: supine  Prep: ChloraPrep  Patient monitoring: heart rate, cardiac monitor, continuous pulse ox, continuous capnometry and frequent blood pressure checks  Block type: adductor canal  Laterality: left  Injection technique: single shot  Needle  Needle type: Stimuplex   Needle gauge: 20 G  Needle length: 4 in  Needle localization: ultrasound guidance   -ultrasound image captured on disc.  Assessment  Injection assessment: negative aspiration, negative parasthesia and local visualized surrounding nerve  Paresthesia pain: none  Heart rate change: no  Slow fractionated injection: yes  Pain Tolerance: comfortable throughout block and no complaints  Medications:    Medications: ROPIvacaine (NAROPIN) injection 0.75% - Perineural   15 mL - 1/30/2025 8:08:00 AM

## 2025-01-30 NOTE — PROGRESS NOTES
Report given and care released to CLARENCE Tolbert. VSS- 102/66, HR 74, O2 96% 2L, Temp 97.4 orally. Drsg c/d/I. Cryocuff in place along with immobilizer. Neuro intact to LLE. DP pulse +2. NADN.  at bedside.

## 2025-01-30 NOTE — ANESTHESIA PROCEDURE NOTES
Spinal    Diagnosis: lt tka  Patient location during procedure: OR  Start time: 1/30/2025 8:01 AM  Timeout: 1/30/2025 8:00 AM  End time: 1/30/2025 8:05 AM    Staffing  Authorizing Provider: Shan Diaz MD  Performing Provider: Shan Diaz MD    Staffing  Performed by: Shan Diaz MD  Authorized by: Shan Diaz MD    Preanesthetic Checklist  Completed: patient identified, IV checked, risks and benefits discussed, surgical consent, monitors and equipment checked, pre-op evaluation and timeout performed  Spinal Block  Patient position: sitting  Prep: Betadine  Patient monitoring: heart rate, continuous pulse ox, continuous capnometry and frequent blood pressure checks  Approach: midline  Location: L3-4  Injection technique: single shot  CSF Fluid: clear free-flowing CSF  Needle  Needle type: Quincke   Needle gauge: 22 G  Needle length: 4 in  Needle localization: anatomical landmarks  Assessment  Sensory level: T8   Dermatomal levels determined by alcohol wipe  Ease of block: easy  Patient's tolerance of the procedure: comfortable throughout block and no complaints  Medications:    Medications: BUPivacaine (pf) (MARCAINE) injection 0.75% - Intraspinal   1.25 mL - 1/30/2025 8:03:00 AM

## 2025-01-30 NOTE — PLAN OF CARE
SS consulted for . Choice obtained for [employer] How [employer]. Initial info faxed. Stephanie notified. SS following.

## 2025-01-30 NOTE — PT/OT/SLP EVAL
Occupational Therapy   Evaluation    Name: Heidy Moyer  MRN: [id number]  Admitting Diagnosis: Arthritis of left knee  Recent Surgery: Procedure(s) (LRB):  ARTHROPLASTY, KNEE, TOTAL, USING COMPUTER-ASSISTED NAVIGATION (Left) Day of Surgery    Recommendations:     Discharge Recommendations: Low Intensity Therapy  Discharge Equipment Recommendations:  none  Barriers to discharge:  None    Assessment:     Heidy Moyer is a 64 y.o. female with a medical diagnosis of Arthritis of left knee.  She presents with s/p left TKR on 1/30/25. Performance deficits affecting function: impaired self care skills, impaired functional mobility, gait instability, impaired balance, pain.      Rehab Prognosis: Good; patient would benefit from acute skilled OT services to address these deficits and reach maximum level of function.       Plan:     Patient to be seen 5 x/week to address the above listed problems via self-care/home management, therapeutic activities, therapeutic exercises  Plan of Care Expires: 02/06/25  Plan of Care Reviewed with: patient    Subjective     Chief Complaint: s/p left TKR  Patient/Family Comments/goals: pt agreeable to OT eval    Occupational Profile:  Living Environment: pt lives with  in one story home with 3 steps to enter  Previous level of function: independent with all ADL tasks, IADL tasks, and functional mobility   Roles and Routines: perform self care  Equipment Used at Home: walker, rolling  Assistance upon Discharge: home with home health    Pain/Comfort:  Pain Rating 1: 7/10  Location - Side 1: Left  Location 1: knee  Pain Addressed 1: Pre-medicate for activity    Patients cultural, spiritual, Tenriism conflicts given the current situation: no    Objective:     Communicated with: CLARENCE Bowers prior to session.  Patient found HOB elevated with blood pressure cuff, hemovac, knee immobilizer, oxygen, peripheral IV, pulse ox (continuous), SCD upon OT entry to room.    General  Precautions: Standard, fall  Orthopedic Precautions: LLE weight bearing as tolerated  Braces: Knee immobilizer  Respiratory Status: Nasal cannula, flow 2 L/min    Occupational Performance:    Bed Mobility:    Patient completed Supine to Sit with minimum assistance  Patient completed Sit to Supine with minimum assistance    Functional Mobility/Transfers:  Patient completed Sit <> Stand Transfer with minimum assistance  with  rolling walker   Functional Mobility: pt performed steps away from bed and back to bed with CGA with RW    Activities of Daily Living:  Lower Body Dressing: maximal assistance to sahil socks    Cognitive/Visual Perceptual:  Cognitive/Psychosocial Skills:     -       Oriented to: Person, Place, Time, and Situation   -       Follows Commands/attention:Follows multistep  commands  -       Mood/Affect/Coping skills/emotional control: Cooperative    Physical Exam:  Balance:    -       sitting balance SBA; standing balance CGA  Upper Extremity Range of Motion:     -       Right Upper Extremity: WFL  -       Left Upper Extremity: WFL  Upper Extremity Strength:    -       Right Upper Extremity: WFL  -       Left Upper Extremity: WFL  Gross motor coordination:   WFL    AMPAC 6 Click ADL:  AMPAC Total Score: 21    Treatment & Education:  Pt educated on OT role/POC.   Importance of OOB activity with staff assistance.  Importance of sitting up in the chair throughout the day as tolerated, especially for meals   Safety during functional t/f and mobility with use of RW  Importance of assisting with self-care activities   All questions/concerns answered within OT scope of practice      Patient left HOB elevated with all lines intact, call button in reach, and  present    GOALS:   Multidisciplinary Problems       Occupational Therapy Goals          Problem: Occupational Therapy    Goal Priority Disciplines Outcome Interventions   Occupational Therapy Goal     OT, PT/OT Progressing    Description:  ST.Pt will perform bathing with Beverley with setup at EOB  2.Pt will perform UE dressing with Joanne  3.Pt will perform LE dressing with Beverley  4.Pt will transfer bed/chair/bsc with CGA with RW  5.Pt will perform standing task x 2 min with CGA with RW  6.Tolerate 15 min of tx without fatigue.      LTG:   Restore to max I with selfcare and mobility.                           DME Justifications:  No DME recommended requiring DME justifications    History:     Past Medical History:   Diagnosis Date    Atrial fibrillation     Hyperlipidemia     Hypertension          Past Surgical History:   Procedure Laterality Date    ARTHROPLASTY, KNEE, TOTAL, USING COMPUTER-ASSISTED NAVIGATION Right 2023    Procedure: ARTHROPLASTY, KNEE, TOTAL, USING COMPUTER-ASSISTED NAVIGATION;  Surgeon: William Bravo MD;  Location: [facility];  Service: Orthopedics;  Laterality: Right;  CRYO CUFF    BREAST BIOPSY Right      SECTION      x2    CHOLECYSTECTOMY      EYE SURGERY Left     clogged eye duct surgery (bypass)    HYSTERECTOMY      OOPHORECTOMY      TUBAL LIGATION         Time Tracking:     OT Date of Treatment: 25  OT Start Time: 1517  OT Stop Time: 1535  OT Total Time (min): 18 min    Billable Minutes:Evaluation OT min complexity eval    2025

## 2025-01-30 NOTE — OR NURSING
Patient's BP running 80's/ 50's with HR in the 60's. Dr. Diaz notified and ordered calcium IVP along with continuing fluids.

## 2025-01-31 VITALS
TEMPERATURE: 98 F | OXYGEN SATURATION: 94 % | DIASTOLIC BLOOD PRESSURE: 82 MMHG | HEART RATE: 87 BPM | SYSTOLIC BLOOD PRESSURE: 118 MMHG | HEIGHT: 68 IN | WEIGHT: 255 LBS | RESPIRATION RATE: 16 BRPM | BODY MASS INDEX: 38.65 KG/M2

## 2025-01-31 PROBLEM — N18.31 CKD STAGE 3A, GFR 45-59 ML/MIN: Status: ACTIVE | Noted: 2025-01-31

## 2025-01-31 LAB
ALBUMIN SERPL BCP-MCNC: 3.2 G/DL (ref 3.4–4.8)
ALBUMIN/GLOB SERPL: 1.4 {RATIO}
ALP SERPL-CCNC: 33 U/L (ref 40–150)
ALT SERPL W P-5'-P-CCNC: 14 U/L
ANION GAP SERPL CALCULATED.3IONS-SCNC: 13 MMOL/L (ref 7–16)
AST SERPL W P-5'-P-CCNC: 20 U/L (ref 5–34)
BASOPHILS # BLD AUTO: 0.01 K/UL (ref 0–0.2)
BASOPHILS NFR BLD AUTO: 0.1 % (ref 0–1)
BILIRUB SERPL-MCNC: 0.3 MG/DL
BUN SERPL-MCNC: 29 MG/DL (ref 10–20)
BUN/CREAT SERPL: 20 (ref 6–20)
CALCIUM SERPL-MCNC: 8.4 MG/DL (ref 8.4–10.2)
CHLORIDE SERPL-SCNC: 109 MMOL/L (ref 98–107)
CO2 SERPL-SCNC: 20 MMOL/L (ref 23–31)
CREAT SERPL-MCNC: 1.47 MG/DL (ref 0.55–1.02)
DIFFERENTIAL METHOD BLD: ABNORMAL
EGFR (NO RACE VARIABLE) (RUSH/TITUS): 39 ML/MIN/1.73M2
EOSINOPHIL # BLD AUTO: 0 K/UL (ref 0–0.5)
EOSINOPHIL NFR BLD AUTO: 0 % (ref 1–4)
ERYTHROCYTE [DISTWIDTH] IN BLOOD BY AUTOMATED COUNT: 13.8 % (ref 11.5–14.5)
GLOBULIN SER-MCNC: 2.3 G/DL (ref 2–4)
GLUCOSE SERPL-MCNC: 190 MG/DL (ref 82–115)
HCT VFR BLD AUTO: 36.4 % (ref 38–47)
HGB BLD-MCNC: 11.6 G/DL (ref 12–16)
IMM GRANULOCYTES # BLD AUTO: 0.06 K/UL (ref 0–0.04)
IMM GRANULOCYTES NFR BLD: 0.5 % (ref 0–0.4)
INR BLD: 1.39
LYMPHOCYTES # BLD AUTO: 0.84 K/UL (ref 1–4.8)
LYMPHOCYTES NFR BLD AUTO: 6.6 % (ref 27–41)
MCH RBC QN AUTO: 31 PG (ref 27–31)
MCHC RBC AUTO-ENTMCNC: 31.9 G/DL (ref 32–36)
MCV RBC AUTO: 97.3 FL (ref 80–96)
MONOCYTES # BLD AUTO: 0.29 K/UL (ref 0–0.8)
MONOCYTES NFR BLD AUTO: 2.3 % (ref 2–6)
MPC BLD CALC-MCNC: 11.4 FL (ref 9.4–12.4)
NEUTROPHILS # BLD AUTO: 11.54 K/UL (ref 1.8–7.7)
NEUTROPHILS NFR BLD AUTO: 90.5 % (ref 53–65)
NRBC # BLD AUTO: 0 X10E3/UL
NRBC, AUTO (.00): 0 %
PLATELET # BLD AUTO: 245 K/UL (ref 150–400)
POTASSIUM SERPL-SCNC: 4.9 MMOL/L (ref 3.5–5.1)
PROT SERPL-MCNC: 5.5 G/DL (ref 5.8–7.6)
PROTHROMBIN TIME: 16.9 SECONDS (ref 11.7–14.7)
RBC # BLD AUTO: 3.74 M/UL (ref 4.2–5.4)
SODIUM SERPL-SCNC: 137 MMOL/L (ref 136–145)
WBC # BLD AUTO: 12.74 K/UL (ref 4.5–11)

## 2025-01-31 PROCEDURE — 97116 GAIT TRAINING THERAPY: CPT

## 2025-01-31 PROCEDURE — 85025 COMPLETE CBC W/AUTO DIFF WBC: CPT | Performed by: ORTHOPAEDIC SURGERY

## 2025-01-31 PROCEDURE — 80053 COMPREHEN METABOLIC PANEL: CPT | Performed by: ORTHOPAEDIC SURGERY

## 2025-01-31 PROCEDURE — 99214 OFFICE O/P EST MOD 30 MIN: CPT | Mod: ,,, | Performed by: INTERNAL MEDICINE

## 2025-01-31 PROCEDURE — 36415 COLL VENOUS BLD VENIPUNCTURE: CPT | Performed by: ORTHOPAEDIC SURGERY

## 2025-01-31 PROCEDURE — 97535 SELF CARE MNGMENT TRAINING: CPT

## 2025-01-31 PROCEDURE — 97110 THERAPEUTIC EXERCISES: CPT

## 2025-01-31 PROCEDURE — 85610 PROTHROMBIN TIME: CPT | Performed by: INTERNAL MEDICINE

## 2025-01-31 PROCEDURE — 63600175 PHARM REV CODE 636 W HCPCS: Performed by: ORTHOPAEDIC SURGERY

## 2025-01-31 PROCEDURE — 25000003 PHARM REV CODE 250: Performed by: ORTHOPAEDIC SURGERY

## 2025-01-31 RX ORDER — HYDROCODONE BITARTRATE AND ACETAMINOPHEN 10; 325 MG/1; MG/1
1 TABLET ORAL EVERY 6 HOURS PRN
Qty: 28 TABLET | Refills: 0 | Status: SHIPPED | OUTPATIENT
Start: 2025-01-31 | End: 2025-02-07

## 2025-01-31 RX ADMIN — CEFAZOLIN 2 G: 2 INJECTION, POWDER, FOR SOLUTION INTRAMUSCULAR; INTRAVENOUS at 01:01

## 2025-01-31 RX ADMIN — DILTIAZEM HYDROCHLORIDE 240 MG: 240 CAPSULE, EXTENDED RELEASE ORAL at 09:01

## 2025-01-31 RX ADMIN — CARVEDILOL 25 MG: 25 TABLET, FILM COATED ORAL at 09:01

## 2025-01-31 RX ADMIN — CETIRIZINE HYDROCHLORIDE 10 MG: 10 TABLET, FILM COATED ORAL at 09:01

## 2025-01-31 RX ADMIN — LOSARTAN POTASSIUM 25 MG: 25 TABLET, FILM COATED ORAL at 09:01

## 2025-01-31 RX ADMIN — HYDROCODONE BITARTRATE AND ACETAMINOPHEN 1 TABLET: 5; 325 TABLET ORAL at 09:01

## 2025-01-31 RX ADMIN — HYDROCHLOROTHIAZIDE 25 MG: 25 TABLET ORAL at 09:01

## 2025-01-31 RX ADMIN — DOCUSATE SODIUM 100 MG: 100 CAPSULE, LIQUID FILLED ORAL at 09:01

## 2025-01-31 NOTE — PROGRESS NOTES
[facility]    Patient Name:  Heidy Moyer   MRN:  [id number]  Date: 1/31/2025  Diagnosis: Arthritis of left knee    History:     Past Medical History:   Diagnosis Date    Atrial fibrillation     Hyperlipidemia     Hypertension        Social History     Socioeconomic History    Marital status:    Tobacco Use    Smoking status: Never    Smokeless tobacco: Never   Substance and Sexual Activity    Alcohol use: Never    Drug use: Never     Social Drivers of Health     Financial Resource Strain: Low Risk  (1/30/2025)    Overall Financial Resource Strain (CARDIA)     Difficulty of Paying Living Expenses: Not hard at all   Food Insecurity: No Food Insecurity (1/30/2025)    Hunger Vital Sign     Worried About Running Out of Food in the Last Year: Never true     Ran Out of Food in the Last Year: Never true   Transportation Needs: No Transportation Needs (1/30/2025)    TRANSPORTATION NEEDS     Transportation : No   Physical Activity: Inactive (1/30/2025)    Exercise Vital Sign     Days of Exercise per Week: 0 days     Minutes of Exercise per Session: 0 min   Stress: No Stress Concern Present (1/30/2025)    [employer] [place] [employer] - Occupational Stress Questionnaire     Feeling of Stress : Not at all   Housing Stability: Low Risk  (1/30/2025)    Housing Stability Vital Sign     Unable to Pay for Housing in the Last Year: No     Homeless in the Last Year: No       Precautions:     Allergies as of 01/21/2025 - Reviewed 10/23/2024   Allergen Reaction Noted    Lovastatin Other (See Comments) 03/16/2021    Pravastatin Other (See Comments) 03/16/2021       [facility] Assessment Details/Treatment     Narrative: Seen patient for initiation of preventative skin care measures    Patient up in chair, Alert. Has dressing to Left leg. Mepliex foam border to Right heel. Instructed can take foam border off heel once at home but prefer to leave on until shower on Sunday. Voiced ok.  Explained border for skin prevention. Voiced okay. Has overlay to bed.   Marck score 20    D/c home today        01/31/2025

## 2025-01-31 NOTE — ASSESSMENT & PLAN NOTE
Patient's blood pressure range in the last 24 hours was: BP  Min: 103/68  Max: 135/98.The patient's inpatient anti-hypertensive regimen is listed below:  Current Antihypertensives  carvediloL tablet 25 mg, 2 times daily, Oral  diltiaZEM 24 hr capsule 240 mg, Daily, Oral  hydroCHLOROthiazide tablet 25 mg, Daily, Oral  losartan tablet 25 mg, Daily, Oral    Plan  - BP is controlled, no changes needed to their regimen  - Monitor and adjust therapy as needed.

## 2025-01-31 NOTE — PROGRESS NOTES
[facility] Hospital Medicine  Progress Note    Patient Name: Heidy Moyer  MRN: [id number]  Patient Class: OP- Outpatient Recovery   Admission Date: 1/30/2025  Length of Stay: 0 days  Attending Physician: No att. providers found  Primary Care Provider: Nakia Camarillo FNP        Subjective     Principal Problem:Arthritis of left knee        HPI:  64 year old female with s/p Total Left Knee Arthroplasty performed today by Dr. Bravo.  Past medical history includes hypertension, hyperlipidemia, and atrial fibrillation.  Home medications were reviewed, and appropriate medications were restarted.   We will continue to follow the patient, and we appreciate the opportunity to be part of their care.    Overview/Hospital Course:  1/31- Doing well post op. Pain is in control.     Interval History: patient seen and examined at the bedside, reports doing well, pain in control. No nausea, vomiting, chest pain, palpitations or shortness of breath reported.     Review of Systems   All other systems reviewed and are negative.    Objective:     Vital Signs (Most Recent):  Temp: 97.9 °F (36.6 °C) (01/31/25 1137)  Pulse: 87 (01/31/25 1137)  Resp: 16 (01/31/25 1137)  BP: 118/82 (01/31/25 1137)  SpO2: (!) 94 % (01/31/25 1137) Vital Signs (24h Range):  Temp:  [97.9 °F (36.6 °C)-98.4 °F (36.9 °C)] 97.9 °F (36.6 °C)  Pulse:  [] 87  Resp:  [16-19] 16  SpO2:  [90 %-96 %] 94 %  BP: (103-135)/(68-98) 118/82     Weight: 115.7 kg (255 lb)  Body mass index is 38.77 kg/m².    Intake/Output Summary (Last 24 hours) at 1/31/2025 1344  Last data filed at 1/31/2025 0800  Gross per 24 hour   Intake --   Output 200 ml   Net -200 ml         Physical Exam  Vitals and nursing note reviewed.   Constitutional:       Appearance: Normal appearance.   HENT:      Head: Normocephalic and atraumatic.      Mouth/Throat:      Mouth: Mucous membranes are moist.   Eyes:      Extraocular Movements: Extraocular movements  intact.      Pupils: Pupils are equal, round, and reactive to light.   Cardiovascular:      Rate and Rhythm: Normal rate and regular rhythm.   Pulmonary:      Effort: Pulmonary effort is normal.      Breath sounds: Normal breath sounds.   Abdominal:      General: Bowel sounds are normal.      Palpations: Abdomen is soft.   Musculoskeletal:      Cervical back: Normal range of motion and neck supple.      Comments: Left knee dressing in place.    Neurological:      General: No focal deficit present.      Mental Status: She is alert and oriented to person, place, and time. Mental status is at baseline.   Psychiatric:         Mood and Affect: Mood normal.         Behavior: Behavior normal.             Significant Labs: All pertinent labs within the past 24 hours have been reviewed.    Significant Imaging: I have reviewed all pertinent imaging results/findings within the past 24 hours.    Assessment and Plan     * Arthritis of left knee  64 year old female with s/p Total Left Knee Arthroplasty performed on 1/30 by Dr. Bravo.  Pain control.  PT/OT evaluation done.          CKD stage 3a, GFR 45-59 ml/min  Creatine stable for now. BMP reviewed- noted Estimated Creatinine Clearance: 51 mL/min (A) (based on SCr of 1.47 mg/dL (H)). according to latest data. Based on current GFR, CKD stage is stage 3 - GFR 30-59.  Monitor UOP and serial BMP and adjust therapy as needed. Renally dose meds. Avoid nephrotoxic medications and procedures.    Atrial fibrillation  Patient is currently in atrial fibrillation. [id number] Score: 2. The patients heart rate in the last 24 hours is as follows:  Pulse  Min: 72  Max: 115     Antiarrhythmics  diltiaZEM 24 hr capsule 240 mg, Daily, Oral    Anticoagulants  warfarin (COUMADIN) tablet 5 mg, Daily, Oral    Plan  - Replete lytes with a goal of K>4, Mg >2  - Patient is anticoagulated, see medications listed above.  - Patient's afib is currently controlled  - Monitor and adjust therapy as needed.        Hyperlipidemia  Continue with statin      Primary hypertension  Patient's blood pressure range in the last 24 hours was: BP  Min: 103/68  Max: 135/98.The patient's inpatient anti-hypertensive regimen is listed below:  Current Antihypertensives  carvediloL tablet 25 mg, 2 times daily, Oral  diltiaZEM 24 hr capsule 240 mg, Daily, Oral  hydroCHLOROthiazide tablet 25 mg, Daily, Oral  losartan tablet 25 mg, Daily, Oral    Plan  - BP is controlled, no changes needed to their regimen  - Monitor and adjust therapy as needed.         VTE Risk Mitigation (From admission, onward)           Ordered     IP VTE HIGH RISK PATIENT  Once         01/31/25 0946     Place RAMA hose  Until discontinued        Comments: RAMA hose    01/31/25 0946     warfarin (COUMADIN) tablet 5 mg  Daily         01/30/25 1254     Place RAMA hose  Until discontinued        Comments: RAAM to non-operative leg.    01/30/25 1254                    Discharge Planning   ERMA: 1/31/2025     Code Status: Full Code   Medical Readiness for Discharge Date: 1/31/2025  Discharge Plan A: Home with family, Home Health   Discharge Delays: None known at this time            Please place Justification for DME        ANA MARIA WOODRUFF MD  Department of Hospital Medicine   [facility]

## 2025-01-31 NOTE — ASSESSMENT & PLAN NOTE
Patient is currently in atrial fibrillation. [id number] Score: 2. The patients heart rate in the last 24 hours is as follows:  Pulse  Min: 61  Max: 115     Antiarrhythmics  diltiaZEM 24 hr capsule 240 mg, Daily, Oral    Anticoagulants  warfarin (COUMADIN) tablet 5 mg, Daily, Oral    Plan  - Replete lytes with a goal of K>4, Mg >2  - Patient is anticoagulated, see medications listed above.  - Patient's afib is currently controlled  - Monitor and adjust therapy as needed.

## 2025-01-31 NOTE — ASSESSMENT & PLAN NOTE
64 year old female with s/p Total Left Knee Arthroplasty performed on 1/30 by Dr. Bravo.  Pain control.  PT/OT evaluation done.

## 2025-01-31 NOTE — ASSESSMENT & PLAN NOTE
Patient is currently in atrial fibrillation. [id number] Score: 2. The patients heart rate in the last 24 hours is as follows:  Pulse  Min: 72  Max: 115     Antiarrhythmics  diltiaZEM 24 hr capsule 240 mg, Daily, Oral    Anticoagulants  warfarin (COUMADIN) tablet 5 mg, Daily, Oral    Plan  - Replete lytes with a goal of K>4, Mg >2  - Patient is anticoagulated, see medications listed above.  - Patient's afib is currently controlled  - Monitor and adjust therapy as needed.

## 2025-01-31 NOTE — SUBJECTIVE & OBJECTIVE
Past Medical History:   Diagnosis Date    Atrial fibrillation     Hyperlipidemia     Hypertension        Past Surgical History:   Procedure Laterality Date    ARTHROPLASTY, KNEE, TOTAL, USING COMPUTER-ASSISTED NAVIGATION Right 2023    Procedure: ARTHROPLASTY, KNEE, TOTAL, USING COMPUTER-ASSISTED NAVIGATION;  Surgeon: William Bravo MD;  Location: [facility];  Service: Orthopedics;  Laterality: Right;  CRYO CUFF    BREAST BIOPSY Right      SECTION      x2    CHOLECYSTECTOMY      EYE SURGERY Left     clogged eye duct surgery (bypass)    HYSTERECTOMY      OOPHORECTOMY      TUBAL LIGATION         Review of patient's allergies indicates:   Allergen Reactions    Lovastatin Other (See Comments)    Pravastatin Other (See Comments)       No current facility-administered medications on file prior to encounter.     Current Outpatient Medications on File Prior to Encounter   Medication Sig    carvediloL (COREG) 25 MG tablet Take 1 tablet (25 mg total) by mouth 2 (two) times daily.    diltiaZEM (CARDIZEM CD) 240 MG 24 hr capsule Take 1 capsule (240 mg total) by mouth once daily.    hydroCHLOROthiazide (HYDRODIURIL) 12.5 MG Tab Take 2 tablets (25 mg total) by mouth once daily.    olmesartan (BENICAR) 40 MG tablet Take 1 tablet (40 mg total) by mouth once daily.    calcium carbonate/vitamin D3 (CALTRATE 600 + D ORAL) Take 1 tablet by mouth 2 (two) times a day.    cyanocobalamin, vitamin B-12, 5,000 mcg TbDL Take 1 tablet by mouth Daily.    fenofibrate (TRICOR) 145 MG tablet Take 1 tablet (145 mg total) by mouth once daily.    loratadine (CLARITIN) 10 mg tablet Take 10 mg by mouth once daily.    magnesium chloride (MAG 64 ORAL) Take 1 tablet by mouth 2 (two) times a day.    multivit/folic acid/vit K1 (ONE-A-DAY WOMEN'S 50 PLUS ORAL) Take 1 tablet by mouth Daily.    mupirocin (BACTROBAN) 2 % ointment Apply topically once daily.    rosuvastatin (CRESTOR) 20 MG tablet Take 1 tablet (20 mg total) by mouth once  daily.    warfarin (COUMADIN) 5 MG tablet Take 1 tablet (5 mg total) by mouth Daily. Take 1 tab daily and 0.5 tab every Wed and Sun (Patient taking differently: Take 5 mg by mouth Daily. Take 1 tab daily and 0.5 tab every Mon and Wed)     Family History       Problem Relation (Age of Onset)    Breast cancer Sister, Sister, Sister    Heart attack Father    Heart disease Mother          Tobacco Use    Smoking status: Never    Smokeless tobacco: Never   Substance and Sexual Activity    Alcohol use: Never    Drug use: Never    Sexual activity: Not on file     Review of Systems   Constitutional:  Negative for chills and fever.   Respiratory:  Negative for cough and shortness of breath.    Cardiovascular:  Negative for chest pain and leg swelling.   Gastrointestinal:  Negative for abdominal pain, diarrhea, nausea and vomiting.   Genitourinary:  Negative for difficulty urinating.   Neurological:  Negative for headaches.     Objective:     Vital Signs (Most Recent):  Temp: 98 °F (36.7 °C) (01/30/25 1954)  Pulse: 107 (01/30/25 1954)  Resp: 18 (01/30/25 1954)  BP: 124/77 (01/30/25 1954)  SpO2: 96 % (01/30/25 1954) Vital Signs (24h Range):  Temp:  [97.4 °F (36.3 °C)-98.1 °F (36.7 °C)] 98 °F (36.7 °C)  Pulse:  [] 107  Resp:  [10-20] 18  SpO2:  [88 %-97 %] 96 %  BP: ()/(27-80) 124/77     Weight: 115.7 kg (255 lb)  Body mass index is 38.77 kg/m².     Physical Exam  Vitals reviewed.   Constitutional:       General: She is not in acute distress.     Appearance: She is obese.   HENT:      Head: Normocephalic and atraumatic.      Right Ear: External ear normal.      Left Ear: External ear normal.      Nose: Nose normal. No congestion.      Mouth/Throat:      Mouth: Mucous membranes are moist.   Eyes:      Conjunctiva/sclera: Conjunctivae normal.   Cardiovascular:      Rate and Rhythm: Tachycardia present. Rhythm irregular.      Heart sounds: Normal heart sounds. No murmur heard.  Pulmonary:      Effort: Pulmonary effort  is normal. No respiratory distress.      Breath sounds: Normal breath sounds.   Abdominal:      General: Bowel sounds are normal.      Tenderness: There is no abdominal tenderness.   Musculoskeletal:         General: Normal range of motion.      Cervical back: Normal range of motion.      Right lower leg: No edema.   Skin:     General: Skin is warm.      Coloration: Skin is not jaundiced.   Neurological:      General: No focal deficit present.      Mental Status: She is alert.   Psychiatric:         Mood and Affect: Mood normal.         Behavior: Behavior normal.         Thought Content: Thought content normal.          Significant Labs: All pertinent labs within the past 24 hours have been reviewed.    Significant Imaging: I have reviewed all pertinent imaging results/findings within the past 24 hours.

## 2025-01-31 NOTE — DISCHARGE SUMMARY
"  Department of Orthopedic Surgery  Discharge Note    Admit Date: 1/30/2025  Discharge Date and Time: 1/31/2025   Attending Physician: William Bravo MD   Discharge Provider: Haley Leonard    Pre-op Diagnosis: Left knee pain, unspecified chronicity [M25.562]  Arthritis of left knee [M17.12]  Procedure:  Left total knee arthroplasty  Final Diagnosis:     Disposition: Home-Health Care [facility]  Discharged Condition: good    Hospital Course:   Patient came in on 1/30/2025 and underwent left total knee arthroplasty without complications. POD1 patient resting comfortably in bed this morning without complaint.  Okay to continue home warfarin postoperatively, patient will go home with Mary Esther 10/325 as needed for pain.  Hemovac drain pull today.  DC staples in 2 weeks by home health.  Follow up with the orthopedic clinic in 3-4 weeks.    Physical Exam:  Left knee with knee immobilizer in place, surgical dressing clean dry and intact, Hemovac removed, good range of motion with dorsiflexion and plantar flexion of the foot, neurovascularly intact  Surgical incision is clean,dry,intact with minimal/expected erythema and swelling    Discharge Instructions:   Discharge Procedure Orders (must include Diet, Follow-up, Activity)   WALKER FOR HOME USE     Order Specific Question Answer Comments   Type of Walker: Adult (5'4"-6'6")    With wheels? Yes    Height: 5' 8" (1.727 m)    Weight: 115.7 kg (255 lb)    Length of need (1-99 months): 3    Does patient have medical equipment at home? walker, rolling    Please check all that apply: Patient's condition impairs ambulation.    Please check all that apply: Patient is unable to safely ambulate without equipment.      3 IN 1 COMMODE FOR HOME USE     Order Specific Question Answer Comments   Type: Standard    Height: 5' 8" (1.727 m)    Weight: 115.7 kg (255 lb)    Does patient have medical equipment at home? walker, rolling    Length of need (1-99 months): 3      Referral to Home health "   Referral Priority: Routine Referral Type: Home Health   Referral Reason: Specialty Services Required   Requested Specialty: Home Health Services   Number of Visits Requested: 1     Diet general     Keep surgical extremity elevated   Order Comments: Elevated at ankle, no pillow under knee.  Wear RAMA hose, may remove twice a day for 1 hour then replace. Continue incentive spirometry every 2 hours while awake. Increase fluid intake. Continue Nozin nasal swab to nares twice a day until bottle is empty. Physical therapy daily x 5 days then 3 x week     Ice to affected area   Order Comments: using barrier between ice and skin (specify duration&frequency). Apply Cool Jet to operative extremity.     No driving, operating heavy equipment or signing legal documents while taking pain medication     Change dressing (specify)   Order Comments: Swingbed / Home health nurse to change dressing on post-op day #3.  Apply Aquacel AG dressing. Repeat dressing change in 7 days.  Notify physician of any wound drainage. Dc staples in 2 weeks from surgery and steristrip incision. Home health physical therapy daily x 5 days then 3 x week     Call MD for:  temperature >100.4     Call MD for:  redness, tenderness, or signs of infection (pain, swelling, redness, odor or green/yellow discharge around incision site)     Weight bearing as tolerated   Order Comments: With walker        Medications:     Medication List        START taking these medications      HYDROcodone-acetaminophen  mg per tablet  Commonly known as: NORCO  Take 1 tablet by mouth every 6 (six) hours as needed for Pain.            CONTINUE taking these medications      CALTRATE 600 + D ORAL     carvediloL 25 MG tablet  Commonly known as: COREG  Take 1 tablet (25 mg total) by mouth 2 (two) times daily.     cyanocobalamin (vitamin B-12) 5,000 mcg Tbdl     diltiaZEM 240 MG 24 hr capsule  Commonly known as: CARDIZEM CD  Take 1 capsule (240 mg total) by mouth once daily.      fenofibrate 145 MG tablet  Commonly known as: TRICOR  Take 1 tablet (145 mg total) by mouth once daily.     hydroCHLOROthiazide 12.5 MG Tab  Take 2 tablets (25 mg total) by mouth once daily.     loratadine 10 mg tablet  Commonly known as: CLARITIN     MAG 64 ORAL     mupirocin 2 % ointment  Commonly known as: BACTROBAN  Apply topically once daily.     olmesartan 40 MG tablet  Commonly known as: BENICAR  Take 1 tablet (40 mg total) by mouth once daily.     ONE-A-DAY WOMEN'S 50 PLUS ORAL     rosuvastatin 20 MG tablet  Commonly known as: CRESTOR  Take 1 tablet (20 mg total) by mouth once daily.     sulfamethoxazole-trimethoprim 800-160mg 800-160 mg Tab  Commonly known as: BACTRIM DS  Take 1 tablet by mouth 2 (two) times daily.     warfarin 5 MG tablet  Commonly known as: COUMADIN  Take 1 tablet (5 mg total) by mouth Daily. Take 1 tab daily and 0.5 tab every Wed and Sun               Where to Get Your Medications        These medications were sent to [facility]  [address] [place] [id number]      Hours: Mon-Fri, 8:30a-5:00p Phone: [phone]   HYDROcodone-acetaminophen  mg per tablet           Follow up/Patient Instructions:     Follow-up Information       Haley Leonard PA Follow up in 3 week(s).    Specialty: Orthopedic Surgery  Contact information:  [address] [place] [id number]  [phone]

## 2025-01-31 NOTE — PT/OT/SLP PROGRESS
Physical Therapy Treatment    Patient Name:  Heidy Moyer   MRN:  [id number]    Recommendations:     Discharge Recommendations: Low Intensity Therapy  Discharge Equipment Recommendations: none  Barriers to discharge: None    Assessment:     Heidy Moyer is a 64 y.o. female admitted with a medical diagnosis of Arthritis of left knee.  She presents with the following impairments/functional limitations: impaired functional mobility, gait instability, decreased lower extremity function, pain Pt doing very well post op with minimal pain. Demonstrates good ROM and ambulation. Plans for home today.    Rehab Prognosis: Good; patient would benefit from acute skilled PT services to address these deficits and reach maximum level of function.    Recent Surgery: Procedure(s) (LRB):  ARTHROPLASTY, KNEE, TOTAL, USING COMPUTER-ASSISTED NAVIGATION (Left) 1 Day Post-Op    Plan:     During this hospitalization, patient to be seen BID (5x/wk, daily 2x/wk) to address the identified rehab impairments via gait training, therapeutic activities, therapeutic exercises, neuromuscular re-education and progress toward the following goals:    Plan of Care Expires:  02/28/25    Subjective     Chief Complaint: left total knee replacement   Patient/Family Comments/goals: Pt is agreeable to PT   Pain/Comfort:  Pain Rating 1: 3/10  Location - Side 1: Left  Location 1: knee  Pain Addressed 1: Pre-medicate for activity  Pain Rating Post-Intervention 1: 3/10      Objective:     Communicated with PREETI Nicole RN prior to session.  Patient found HOB elevated with peripheral IV upon PT entry to room.     General Precautions: Standard, fall  Orthopedic Precautions: LLE weight bearing as tolerated  Braces: N/A  Respiratory Status: Nasal cannula, flow 2 L/min     Functional Mobility:  Bed Mobility:     Scooting: supervision  Supine to Sit: supervision  Transfers:     Sit to Stand:  supervision with rolling walker  Bed to Chair: supervision with   rolling walker  using  Step Transfer  Gait: 160 ft stand-by assistance with rolling walker, step to pattern  Balance: good  Stairs:  Pt ascended/descended 3 stair(s) with No Assistive Device with bilateral handrails with Contact Guard Assistance.       AM-PAC 6 CLICK MOBILITY  Turning over in bed (including adjusting bedclothes, sheets and blankets)?: 4  Sitting down on and standing up from a chair with arms (e.g., wheelchair, bedside commode, etc.): 4  Moving from lying on back to sitting on the side of the bed?: 4  Moving to and from a bed to a chair (including a wheelchair)?: 4  Need to walk in hospital room?: 4  Climbing 3-5 steps with a railing?: 3  Basic Mobility Total Score: 23       Treatment & Education:  Pt performed left LE: bed level exercises: ankle pumps, quad sets, and SLR and seated exercises: knee flexion x 30 each  Left knee flexion ROM 7-92 degrees    Patient left up in chair with all lines intact and call button in reach..    GOALS:   Multidisciplinary Problems       Physical Therapy Goals          Problem: Physical Therapy    Goal Priority Disciplines Outcome Interventions   Physical Therapy Goal     PT, PT/OT Progressing    Description: Short term goals:  Pt will perform supine to sit with contact guard assistance  Pt will perform sit to stand with contact guard assistance  Pt will demonstrate left knee flexion range of motion from 0 to 90 degrees  Pt will ambulate 100 ft with contact guard assistanceusing rolling walker  Pt will ascend/descend 3 steps using left handrail with contact guard assistance    Long term goals:  Pt will perform supine to sit with modified independence  Pt will perform sit to stand with modified independence  Pt will ambulate 300 ft with modified independence using rolling walker                             DME Justifications:  No DME recommended requiring DME justifications    Time Tracking:     PT Received On: 01/31/25  PT Start Time: 0904     PT Stop Time: 0940  PT  Total Time (min): 36 min     Billable Minutes: Gait Training 12 and Therapeutic Exercise 20    Treatment Type: Treatment  PT/PTA: PT     Number of PTA visits since last PT visit: 0     01/31/2025

## 2025-01-31 NOTE — PT/OT/SLP PROGRESS
Occupational Therapy   Treatment    Name: Heidy Moyer  MRN: [id number]  Admitting Diagnosis:  Arthritis of left knee  1 Day Post-Op    Recommendations:     Discharge Recommendations: Low Intensity Therapy  Discharge Equipment Recommendations:  none  Barriers to discharge:  None    Assessment:     Heidy Moyer is a 64 y.o. female with a medical diagnosis of Arthritis of left knee.  She presents with s/p left TKR on 1/30/25. Performance deficits affecting function are impaired self care skills, impaired functional mobility, gait instability, impaired balance, pain.     Rehab Prognosis:  Good; patient would benefit from acute skilled OT services to address these deficits and reach maximum level of function.       Plan:     Patient to be seen 5 x/week to address the above listed problems via self-care/home management, therapeutic activities, therapeutic exercises  Plan of Care Expires: 02/06/25  Plan of Care Reviewed with: patient    Subjective     Chief Complaint: s/p left TKR  Patient/Family Comments/goals: pt agreeable to OT tx  Pain/Comfort:  Pain Rating 1: 5/10  Location - Side 1: Left  Location 1: knee  Pain Addressed 1: Pre-medicate for activity    Objective:     Communicated with: CLARENCE Morales prior to session.  Patient found up in chair with peripheral IV upon OT entry to room.    General Precautions: Standard, fall    Orthopedic Precautions:LLE weight bearing as tolerated  Braces: Knee immobilizer  Respiratory Status: Room air     Occupational Performance:     Bed Mobility:    Not performed     Functional Mobility/Transfers:  Patient completed Sit <> Stand Transfer with contact guard assistance  with  rolling walker     Activities of Daily Living:  Upper Body Dressing: modified independence to sahil shirt  Lower Body Dressing: minimum assistance to sahil pants      AMPAC 6 Click ADL:      Treatment & Education:  Pt performed ADL training as listed above.     Patient left up in chair with all  lines intact, call button in reach, CLARENCE Morales notified, and  present    GOALS:   Multidisciplinary Problems       Occupational Therapy Goals          Problem: Occupational Therapy    Goal Priority Disciplines Outcome Interventions   Occupational Therapy Goal     OT, PT/OT Progressing    Description: ST.Pt will perform bathing with Beverley with setup at EOB  2.Pt will perform UE dressing with Joanne  3.Pt will perform LE dressing with Beverley  4.Pt will transfer bed/chair/bsc with CGA with RW  5.Pt will perform standing task x 2 min with CGA with RW  6.Tolerate 15 min of tx without fatigue.      LTG:   Restore to max I with selfcare and mobility.                           DME Justifications:  No DME recommended requiring DME justifications    Time Tracking:     OT Date of Treatment: 25  OT Start Time: 942  OT Stop Time: 953  OT Total Time (min): 11 min    Billable Minutes:Self Care/Home Management 11 minutes    OT/SUSANNE: OT          2025

## 2025-01-31 NOTE — SUBJECTIVE & OBJECTIVE
Interval History: patient seen and examined at the bedside, reports doing well, pain in control. No nausea, vomiting, chest pain, palpitations or shortness of breath reported.     Review of Systems   All other systems reviewed and are negative.    Objective:     Vital Signs (Most Recent):  Temp: 97.9 °F (36.6 °C) (01/31/25 1137)  Pulse: 87 (01/31/25 1137)  Resp: 16 (01/31/25 1137)  BP: 118/82 (01/31/25 1137)  SpO2: (!) 94 % (01/31/25 1137) Vital Signs (24h Range):  Temp:  [97.9 °F (36.6 °C)-98.4 °F (36.9 °C)] 97.9 °F (36.6 °C)  Pulse:  [] 87  Resp:  [16-19] 16  SpO2:  [90 %-96 %] 94 %  BP: (103-135)/(68-98) 118/82     Weight: 115.7 kg (255 lb)  Body mass index is 38.77 kg/m².    Intake/Output Summary (Last 24 hours) at 1/31/2025 1344  Last data filed at 1/31/2025 0800  Gross per 24 hour   Intake --   Output 200 ml   Net -200 ml         Physical Exam  Vitals and nursing note reviewed.   Constitutional:       Appearance: Normal appearance.   HENT:      Head: Normocephalic and atraumatic.      Mouth/Throat:      Mouth: Mucous membranes are moist.   Eyes:      Extraocular Movements: Extraocular movements intact.      Pupils: Pupils are equal, round, and reactive to light.   Cardiovascular:      Rate and Rhythm: Normal rate and regular rhythm.   Pulmonary:      Effort: Pulmonary effort is normal.      Breath sounds: Normal breath sounds.   Abdominal:      General: Bowel sounds are normal.      Palpations: Abdomen is soft.   Musculoskeletal:      Cervical back: Normal range of motion and neck supple.      Comments: Left knee dressing in place.    Neurological:      General: No focal deficit present.      Mental Status: She is alert and oriented to person, place, and time. Mental status is at baseline.   Psychiatric:         Mood and Affect: Mood normal.         Behavior: Behavior normal.             Significant Labs: All pertinent labs within the past 24 hours have been reviewed.    Significant Imaging: I have  reviewed all pertinent imaging results/findings within the past 24 hours.

## 2025-01-31 NOTE — HPI
64 year old female with s/p Total Left Knee Arthroplasty performed today by Dr. Bravo.  Past medical history includes hypertension, hyperlipidemia, and atrial fibrillation.  Home medications were reviewed, and appropriate medications were restarted.   We will continue to follow the patient, and we appreciate the opportunity to be part of their care.

## 2025-01-31 NOTE — ASSESSMENT & PLAN NOTE
64 year old female with s/p Total Left Knee Arthroplasty performed today by Dr. Bravo.  Primary team following.  We agree with management.  Patient was able to urinate after the surgery.  No complaints at the moment.   Monitor and adjust therapy as needed.

## 2025-01-31 NOTE — ASSESSMENT & PLAN NOTE
Creatine stable for now. BMP reviewed- noted Estimated Creatinine Clearance: 51 mL/min (A) (based on SCr of 1.47 mg/dL (H)). according to latest data. Based on current GFR, CKD stage is stage 3 - GFR 30-59.  Monitor UOP and serial BMP and adjust therapy as needed. Renally dose meds. Avoid nephrotoxic medications and procedures.

## 2025-01-31 NOTE — NURSING
Patient awake alert and oriented. No noted distress at this time. V/S remain stable. Discharge instructions provided with  at bedside. Patient and family verbalizes understanding to discharge teaching and denies any further need of education at this time.

## 2025-01-31 NOTE — CONSULTS
[facility]  [facility] Medicine  Consult Note    Patient Name: Heidy Moyer  MRN: [id number]  Admission Date: 2025  Hospital Length of Stay: 0 days  Attending Physician: William Bravo MD   Primary Care Provider: Nakia Camarillo FNP           Patient information was obtained from patient, past medical records, and ER records.     Inpatient consult to Hospitalist  Consult performed by: Ean Dewey MD  Consult ordered by: William Bravo MD        Subjective:     Principal Problem: Arthritis of left knee    Chief Complaint: No chief complaint on file.       HPI: 64 year old female with s/p Total Left Knee Arthroplasty performed today by Dr. Bravo.  Past medical history includes hypertension, hyperlipidemia, and atrial fibrillation.  Home medications were reviewed, and appropriate medications were restarted.   We will continue to follow the patient, and we appreciate the opportunity to be part of their care.    Past Medical History:   Diagnosis Date    Atrial fibrillation     Hyperlipidemia     Hypertension        Past Surgical History:   Procedure Laterality Date    ARTHROPLASTY, KNEE, TOTAL, USING COMPUTER-ASSISTED NAVIGATION Right 2023    Procedure: ARTHROPLASTY, KNEE, TOTAL, USING COMPUTER-ASSISTED NAVIGATION;  Surgeon: William Bravo MD;  Location: [facility];  Service: Orthopedics;  Laterality: Right;  CRYO CUFF    BREAST BIOPSY Right      SECTION      x2    CHOLECYSTECTOMY      EYE SURGERY Left     clogged eye duct surgery (bypass)    HYSTERECTOMY      OOPHORECTOMY      TUBAL LIGATION         Review of patient's allergies indicates:   Allergen Reactions    Lovastatin Other (See Comments)    Pravastatin Other (See Comments)       No current facility-administered medications on file prior to encounter.     Current Outpatient Medications on File Prior to Encounter   Medication Sig    carvediloL (COREG) 25 MG tablet Take 1 tablet (25 mg total) by  mouth 2 (two) times daily.    diltiaZEM (CARDIZEM CD) 240 MG 24 hr capsule Take 1 capsule (240 mg total) by mouth once daily.    hydroCHLOROthiazide (HYDRODIURIL) 12.5 MG Tab Take 2 tablets (25 mg total) by mouth once daily.    olmesartan (BENICAR) 40 MG tablet Take 1 tablet (40 mg total) by mouth once daily.    calcium carbonate/vitamin D3 (CALTRATE 600 + D ORAL) Take 1 tablet by mouth 2 (two) times a day.    cyanocobalamin, vitamin B-12, 5,000 mcg TbDL Take 1 tablet by mouth Daily.    fenofibrate (TRICOR) 145 MG tablet Take 1 tablet (145 mg total) by mouth once daily.    loratadine (CLARITIN) 10 mg tablet Take 10 mg by mouth once daily.    magnesium chloride (MAG 64 ORAL) Take 1 tablet by mouth 2 (two) times a day.    multivit/folic acid/vit K1 (ONE-A-DAY WOMEN'S 50 PLUS ORAL) Take 1 tablet by mouth Daily.    mupirocin (BACTROBAN) 2 % ointment Apply topically once daily.    rosuvastatin (CRESTOR) 20 MG tablet Take 1 tablet (20 mg total) by mouth once daily.    warfarin (COUMADIN) 5 MG tablet Take 1 tablet (5 mg total) by mouth Daily. Take 1 tab daily and 0.5 tab every Wed and Sun (Patient taking differently: Take 5 mg by mouth Daily. Take 1 tab daily and 0.5 tab every Mon and Wed)     Family History       Problem Relation (Age of Onset)    Breast cancer Sister, Sister, Sister    Heart attack Father    Heart disease Mother          Tobacco Use    Smoking status: Never    Smokeless tobacco: Never   Substance and Sexual Activity    Alcohol use: Never    Drug use: Never    Sexual activity: Not on file     Review of Systems   Constitutional:  Negative for chills and fever.   Respiratory:  Negative for cough and shortness of breath.    Cardiovascular:  Negative for chest pain and leg swelling.   Gastrointestinal:  Negative for abdominal pain, diarrhea, nausea and vomiting.   Genitourinary:  Negative for difficulty urinating.   Neurological:  Negative for headaches.     Objective:     Vital Signs (Most Recent):  Temp: 98  °F (36.7 °C) (01/30/25 1954)  Pulse: 107 (01/30/25 1954)  Resp: 18 (01/30/25 1954)  BP: 124/77 (01/30/25 1954)  SpO2: 96 % (01/30/25 1954) Vital Signs (24h Range):  Temp:  [97.4 °F (36.3 °C)-98.1 °F (36.7 °C)] 98 °F (36.7 °C)  Pulse:  [] 107  Resp:  [10-20] 18  SpO2:  [88 %-97 %] 96 %  BP: ()/(27-80) 124/77     Weight: 115.7 kg (255 lb)  Body mass index is 38.77 kg/m².     Physical Exam  Vitals reviewed.   Constitutional:       General: She is not in acute distress.     Appearance: She is obese.   HENT:      Head: Normocephalic and atraumatic.      Right Ear: External ear normal.      Left Ear: External ear normal.      Nose: Nose normal. No congestion.      Mouth/Throat:      Mouth: Mucous membranes are moist.   Eyes:      Conjunctiva/sclera: Conjunctivae normal.   Cardiovascular:      Rate and Rhythm: Tachycardia present. Rhythm irregular.      Heart sounds: Normal heart sounds. No murmur heard.  Pulmonary:      Effort: Pulmonary effort is normal. No respiratory distress.      Breath sounds: Normal breath sounds.   Abdominal:      General: Bowel sounds are normal.      Tenderness: There is no abdominal tenderness.   Musculoskeletal:         General: Normal range of motion.      Cervical back: Normal range of motion.      Right lower leg: No edema.   Skin:     General: Skin is warm.      Coloration: Skin is not jaundiced.   Neurological:      General: No focal deficit present.      Mental Status: She is alert.   Psychiatric:         Mood and Affect: Mood normal.         Behavior: Behavior normal.         Thought Content: Thought content normal.          Significant Labs: All pertinent labs within the past 24 hours have been reviewed.    Significant Imaging: I have reviewed all pertinent imaging results/findings within the past 24 hours.  Assessment/Plan:     * Arthritis of left knee  64 year old female with s/p Total Left Knee Arthroplasty performed today by Dr. Bravo.  Primary team following.  We  agree with management.  Patient was able to urinate after the surgery.  No complaints at the moment.   Monitor and adjust therapy as needed.          Atrial fibrillation  Patient is currently in atrial fibrillation. [id number] Score: 2. The patients heart rate in the last 24 hours is as follows:  Pulse  Min: 61  Max: 115     Antiarrhythmics  diltiaZEM 24 hr capsule 240 mg, Daily, Oral    Anticoagulants  warfarin (COUMADIN) tablet 5 mg, Daily, Oral    Plan  - Replete lytes with a goal of K>4, Mg >2  - Patient is anticoagulated, see medications listed above.  - Patient's afib is currently controlled  - Monitor and adjust therapy as needed.       Primary hypertension  Patient's blood pressure range in the last 24 hours was: BP  Min: 81/55  Max: 124/77.The patient's inpatient anti-hypertensive regimen is listed below:  Current Antihypertensives  carvediloL tablet 25 mg, 2 times daily, Oral  diltiaZEM 24 hr capsule 240 mg, Daily, Oral  hydroCHLOROthiazide tablet 25 mg, Daily, Oral  losartan tablet 25 mg, Daily, Oral    Plan  - BP is controlled, no changes needed to their regimen  - Monitor and adjust therapy as needed.       Hyperlipidemia  Continue with statin        VTE Risk Mitigation (From admission, onward)           Ordered     warfarin (COUMADIN) tablet 5 mg  Daily         01/30/25 1254     IP VTE HIGH RISK PATIENT  Once         01/30/25 1254     Place RAMA hose  Until discontinued        Comments: RAMA to non-operative leg.    01/30/25 1254                        Thank you for your consult. I will follow-up with patient. Please contact us if you have any additional questions.    Ean Dewey MD  Department of Hospital Medicine   [facility]

## 2025-01-31 NOTE — PLAN OF CARE
[facility]  Discharge Final Note    Primary Care Provider: Nakia Camarillo FNP    Expected Discharge Date: 1/31/2025    Final Discharge Note (most recent)       Final Note - 01/31/25 1009          Final Note    Assessment Type Final Discharge Note     Anticipated Discharge Disposition Home-Health Care [facility]     What phone number can be called within the next 1-3 days to see how you are doing after discharge? [phone]        Post-Acute Status    Post-Acute Authorization Home Health     Home Health Status Set-up Complete/Auth obtained     Coverage [facility] Medicare     Patient choice form signed by patient/caregiver List with quality metrics by geographic area provided;List from CMS Compare;List from System Post-Acute Care     Discharge Delays None known at this time                     Important Message from Medicare    observation          Follow-up providers       Haley Leonard PA   Specialty: Orthopedic Surgery    [address] [place] [address]   Phone: [phone]       Next Steps: Follow up in 3 week(s)    Instructions: Please follow up on Febraury 20 at 9:40 a.m              After-discharge care                Home Medical Care       [facility]   Service: Home Health Services    [address] [place] [address]   Phone: [phone]                             Pt mukesh home with family and hh through [employer]. DC paperwork faxed. No further needs noted

## 2025-01-31 NOTE — ASSESSMENT & PLAN NOTE
Patient's blood pressure range in the last 24 hours was: BP  Min: 81/55  Max: 124/77.The patient's inpatient anti-hypertensive regimen is listed below:  Current Antihypertensives  carvediloL tablet 25 mg, 2 times daily, Oral  diltiaZEM 24 hr capsule 240 mg, Daily, Oral  hydroCHLOROthiazide tablet 25 mg, Daily, Oral  losartan tablet 25 mg, Daily, Oral    Plan  - BP is controlled, no changes needed to their regimen  - Monitor and adjust therapy as needed.

## 2025-01-31 NOTE — CARE UPDATE
Patient awake alert without any new complaints.  Drain in his been removed.  Moves her toes dorsiflexion plantar flexion on left lower extremity.  Discharge home.  Resume her Coumadin.  Follow-up in 3-4 weeks.  DC staples in 2-3 weeks.

## 2025-02-06 DIAGNOSIS — Z47.89 ENCOUNTER FOR ORTHOPEDIC FOLLOW-UP CARE: Primary | ICD-10-CM

## 2025-02-06 RX ORDER — HYDROCODONE BITARTRATE AND ACETAMINOPHEN 10; 325 MG/1; MG/1
1 TABLET ORAL EVERY 6 HOURS PRN
Qty: 28 TABLET | Refills: 0 | Status: SHIPPED | OUTPATIENT
Start: 2025-02-06

## 2025-02-06 NOTE — TELEPHONE ENCOUNTER
----- Message from Fauzia sent at 2/6/2025  1:44 PM CST -----  Who Called: Heidy Moyer    Refill or New Rx:Refill  RX Name and Strength:HYDROcodone-acetaminophen (NORCO)  mg per tablet  How is the patient currently taking it? (ex. 1XDay): 1x every 6 hours   Is this a 30 day or 90 day RX:  Local or Mail Order:local   List of preferred pharmacies on file (remove unneeded): [unfilled]  If different Pharmacy is requested, enter Pharmacy information here including location and phone number:  [facility], [address]  Ordering Provider:      Preferred Method of Contact: Phone Call  Patient's Preferred Phone Number on File: [phone]   Best Call Back Number, if different:  Additional Information:

## 2025-02-19 DIAGNOSIS — Z96.652 HISTORY OF ARTHROPLASTY OF LEFT KNEE: Primary | ICD-10-CM

## 2025-02-20 ENCOUNTER — OFFICE VISIT (OUTPATIENT)
Dept: ORTHOPEDICS | Facility: CLINIC | Age: 65
End: 2025-02-20
Payer: MEDICARE

## 2025-02-20 ENCOUNTER — HOSPITAL ENCOUNTER (OUTPATIENT)
Dept: RADIOLOGY | Facility: HOSPITAL | Age: 65
Discharge: HOME | End: 2025-02-20
Payer: MEDICARE

## 2025-02-20 VITALS — BODY MASS INDEX: 38.65 KG/M2 | HEIGHT: 68 IN | WEIGHT: 255.06 LBS

## 2025-02-20 DIAGNOSIS — Z96.652 HISTORY OF ARTHROPLASTY OF LEFT KNEE: ICD-10-CM

## 2025-02-20 DIAGNOSIS — Z96.652 HISTORY OF ARTHROPLASTY OF LEFT KNEE: Primary | ICD-10-CM

## 2025-02-20 PROCEDURE — 73560 X-RAY EXAM OF KNEE 1 OR 2: CPT | Mod: TC,LT

## 2025-02-20 PROCEDURE — 4010F ACE/ARB THERAPY RXD/TAKEN: CPT | Mod: CPTII,,,

## 2025-02-20 PROCEDURE — 99024 POSTOP FOLLOW-UP VISIT: CPT | Mod: ,,,

## 2025-02-20 PROCEDURE — 99999 PR PBB SHADOW E&M-EST. PATIENT-LVL III: CPT | Mod: PBBFAC,,,

## 2025-02-20 PROCEDURE — 99213 OFFICE O/P EST LOW 20 MIN: CPT | Mod: PBBFAC,25

## 2025-02-20 NOTE — PROGRESS NOTES
Arthroplasty, Knee, Total, Using Computer-assisted Navigation - Left 2025    Heidy Moyer is a 64 y.o. female seen today for post-op visit.  Patient is 3 weeks status post left total knee arthroplasty by Dr. Bravo.  Patient is using her rolling walker for ambulation.  She reports swelling of her ankle for the past 2 days.  No calf pain or redness.  She is on warfarin.      PAST MEDICAL HISTORY:   Past Medical History:   Diagnosis Date    Atrial fibrillation     Hyperlipidemia     Hypertension         PAST SURGICAL HISTORY:   Past Surgical History:   Procedure Laterality Date    ARTHROPLASTY, KNEE, TOTAL, USING COMPUTER-ASSISTED NAVIGATION Right 2023    Procedure: ARTHROPLASTY, KNEE, TOTAL, USING COMPUTER-ASSISTED NAVIGATION;  Surgeon: William Bravo MD;  Location: [facility] ORTHO OR;  Service: Orthopedics;  Laterality: Right;  CRYO CUFF    ARTHROPLASTY, KNEE, TOTAL, USING COMPUTER-ASSISTED NAVIGATION Left 2025    Procedure: ARTHROPLASTY, KNEE, TOTAL, USING COMPUTER-ASSISTED NAVIGATION;  Surgeon: William Bravo MD;  Location: [facility] ORTHO OR;  Service: Orthopedics;  Laterality: Left;    BREAST BIOPSY Right      SECTION      x2    CHOLECYSTECTOMY      EYE SURGERY Left     clogged eye duct surgery (bypass)    HYSTERECTOMY      OOPHORECTOMY      TUBAL LIGATION          MEDICATIONS: Current Medications[1]       PHYSICAL EXAM:      GENERAL: Well-developed, well-nourished female . The patient is alert, oriented and cooperative.    EXTREMITIES:  Left knee skin clean dry and intact, nontender to palpation, minimal swelling and erythema around incision sites, good range of motion from 0-100 degrees, knee feels stable to varus and valgus stress testing, neurovascularly intact    WOUND: Incisions are clean,dry,intact without signs of infection and healing well      RADIOGRAPHIC FINDINGS:    EXAMINATION:  XR KNEE 1 OR 2 VIEW LEFT     CLINICAL HISTORY:  Presence of left  artificial knee joint     TECHNIQUE:  One or two views of the left knee were performed.     COMPARISON:  01/30/2025     FINDINGS:  Left TKA with the orthopedic hardware appearing in place and intact.  Interval removal of the surgical drain and skin staples.  Small joint effusion.     Impression:     Left TKA with the orthopedic hardware appearing in place and intact.        Electronically signed by:Sierra Iyer MD  Date:                                            02/20/2025  Time:                                           11:59    Patient Active Problem List    Diagnosis Date Noted    CKD stage 3a, GFR 45-59 ml/min 01/31/2025    Arthritis of left knee 01/29/2025    Status post total knee replacement using cement, left 09/25/2023    Suspected sleep apnea 09/02/2023    Arthritis of right knee 10/17/2022    Primary hypertension     Hyperlipidemia     Atrial fibrillation      IMPRESSION AND PLAN: Patient is status-post Arthroplasty, Knee, Total, Using Computer-assisted Navigation - Left doing well.  Personally reviewed x-rays today showing left total knee arthroplasty in place.  She has some swelling around her ankle, I do not think it is related to a possible blood clot at this time.  Discussed with the patient ice and elevation therapy.  Discussed to call us in 1 week if swelling is not improved.  Otherwise follow up with Dr. Bravo 3 weeks.    No follow-ups on file.       Haley Leonard PA-C      (Subject to voice recognition error, transcription service not allowed)         [1]   Current Outpatient Medications:     calcium carbonate/vitamin D3 (CALTRATE 600 + D ORAL), Take 1 tablet by mouth 2 (two) times a day., Disp: , Rfl:     carvediloL (COREG) 25 MG tablet, Take 1 tablet (25 mg total) by mouth 2 (two) times daily., Disp: 180 tablet, Rfl: 3    cyanocobalamin, vitamin B-12, 5,000 mcg TbDL, Take 1 tablet by mouth Daily., Disp: , Rfl:     diltiaZEM (CARDIZEM CD) 240 MG 24 hr capsule, Take 1 capsule (240 mg  total) by mouth once daily., Disp: 90 capsule, Rfl: 3    ELIQUIS 5 mg Tab, Take 5 mg by mouth 2 (two) times daily., Disp: , Rfl:     fenofibrate (TRICOR) 145 MG tablet, Take 1 tablet (145 mg total) by mouth once daily., Disp: 90 tablet, Rfl: 1    hydroCHLOROthiazide (HYDRODIURIL) 12.5 MG Tab, Take 2 tablets (25 mg total) by mouth once daily., Disp: 90 tablet, Rfl: 1    HYDROcodone-acetaminophen (NORCO)  mg per tablet, Take 1 tablet by mouth every 6 (six) hours as needed for Pain. (Patient not taking: Reported on 3/24/2025), Disp: 28 tablet, Rfl: 0    loratadine (CLARITIN) 10 mg tablet, Take 10 mg by mouth once daily., Disp: , Rfl:     magnesium chloride (MAG 64 ORAL), Take 1 tablet by mouth 2 (two) times a day., Disp: , Rfl:     multivit/folic acid/vit K1 (ONE-A-DAY WOMEN'S 50 PLUS ORAL), Take 1 tablet by mouth Daily., Disp: , Rfl:     mupirocin (BACTROBAN) 2 % ointment, Apply topically once daily. (Patient not taking: Reported on 3/24/2025), Disp: 15 g, Rfl: 1    olmesartan (BENICAR) 40 MG tablet, Take 1 tablet (40 mg total) by mouth once daily., Disp: 90 tablet, Rfl: 3    rosuvastatin (CRESTOR) 20 MG tablet, Take 1 tablet (20 mg total) by mouth once daily., Disp: 90 tablet, Rfl: 3    sulfamethoxazole-trimethoprim 800-160mg (BACTRIM DS) 800-160 mg Tab, Take 1 tablet by mouth 2 (two) times daily. (Patient not taking: Reported on 3/24/2025), Disp: 10 tablet, Rfl: 0    warfarin (COUMADIN) 5 MG tablet, Take 1 tablet (5 mg total) by mouth Daily. Take 1 tab daily and 0.5 tab every Wed and Sun (Patient not taking: Reported on 3/24/2025), Disp: 90 tablet, Rfl: 3

## 2025-02-24 ENCOUNTER — CLINICAL SUPPORT (OUTPATIENT)
Dept: REHABILITATION | Facility: HOSPITAL | Age: 65
End: 2025-02-24
Payer: MEDICARE

## 2025-02-24 ENCOUNTER — EXTERNAL HOME HEALTH (OUTPATIENT)
Dept: HOME HEALTH SERVICES | Facility: HOSPITAL | Age: 65
End: 2025-02-24
Payer: MEDICARE

## 2025-02-24 DIAGNOSIS — M25.562 ACUTE PAIN OF LEFT KNEE: Primary | ICD-10-CM

## 2025-02-24 DIAGNOSIS — Z47.1 AFTERCARE FOLLOWING LEFT KNEE JOINT REPLACEMENT SURGERY: ICD-10-CM

## 2025-02-24 DIAGNOSIS — Z96.652 AFTERCARE FOLLOWING LEFT KNEE JOINT REPLACEMENT SURGERY: ICD-10-CM

## 2025-02-24 DIAGNOSIS — R26.2 DIFFICULTY WALKING: ICD-10-CM

## 2025-02-24 DIAGNOSIS — Z96.652 HISTORY OF ARTHROPLASTY OF LEFT KNEE: ICD-10-CM

## 2025-02-24 DIAGNOSIS — R53.1 WEAKNESS: ICD-10-CM

## 2025-02-24 PROCEDURE — 97161 PT EVAL LOW COMPLEX 20 MIN: CPT

## 2025-02-24 NOTE — PROGRESS NOTES
Outpatient Rehab    Physical Therapy Evaluation (only)    Patient Name: Heidy Moyer  MRN: [id number]  YOB: 1960  Encounter Date: 2/24/2025    Therapy Diagnosis:   Encounter Diagnoses   Name Primary?    History of arthroplasty of left knee     Aftercare following left knee joint replacement surgery     Acute pain of left knee Yes    Weakness     Difficulty walking      Physician: Haley Leonard PA    Physician Orders: Eval and Treat  Medical Diagnosis: L TKA    Visit # / Visits Authorized:  1 / (12 requested)   Date of Evaluation:  2/24/2025   Insurance Authorization Period: 2/24/2025 to 03/21/2025  Plan of Care Certification:  2/24/2025 to 03/21/2025      Time In: 1018   Time Out: 1058  Total Time: 40   Total Billable Time: 40 minutes    Intake Outcome Measure for FOTO Survey    Therapist reviewed FOTO scores for Heidy Moyer on 2/24/2025.   FOTO report - see Media section or FOTO account episode details.     Intake Score: 61%         Subjective   History of Present Illness  Heidy is a 64 y.o. female who reports to physical therapy with a chief concern of L knee pain secondary to TKA.       Patient reports a surgery of L TKA. Surgery occurred on 01/30/25.         History of Present Condition/Illness: Pt reports having L TKA at end of January secondary to L knee OA. She did have home health come see her for a little while but has discharged at this time. Ambulating with RW at time of eval but says that she can walk without it. Pt lives in 2 level home with her bedroom on the ground floor. Walk in shower. Pt's biggest complaint at this time is pain/tightness in the calf and soreness over the patella.     Pain     Patient reports a current pain level of 2/10. Pain at best is reported as 0/10. Pain at worst is reported as 7/10.   Clinical Progression (since onset): Stable  Pain Qualities: Aching, Burning  Pain-Relieving Factors: Rest, Heat, Medications -  prescription  Pain-Aggravating Factors: Sitting         Employment  Employment Status: Retired          Past Medical History/Physical Systems Review:   Heidy Moyer  has a past medical history of Atrial fibrillation, Hyperlipidemia, and Hypertension.    Heidy Moyer  has a past surgical history that includes Hysterectomy; Eye surgery (Left); Cholecystectomy;  section; Tubal ligation; Breast biopsy (Right); arthroplasty, knee, total, using computer-assisted navigation (Right, 2023); Oophorectomy; and arthroplasty, knee, total, using computer-assisted navigation (Left, 2025).    Heidy has a current medication list which includes the following prescription(s): calcium carbonate/vitamin d3, carvedilol, cyanocobalamin (vitamin b-12), diltiazem, fenofibrate, hydrochlorothiazide, hydrocodone-acetaminophen, loratadine, magnesium chloride, multivit-minerals/folic acid, mupirocin, olmesartan, rosuvastatin, sulfamethoxazole-trimethoprim 800-160mg, and warfarin.    Review of patient's allergies indicates:   Allergen Reactions    Lovastatin Other (See Comments)    Pravastatin Other (See Comments)        Objective       Knee Range of Motion   Right Knee   Active (deg) Passive (deg) Pain   Flexion 115       Extension 5           Left Knee   Active (deg) Passive (deg) Pain   Flexion 95       Extension -5           Mod to max deficits in the L gastrocnemius.               Knee Strength   Right Strength Right Pain Left Strength Left  Pain   Flexion (S2) 5   4     Prone Flexion           Extension (L3) 5   4                   Gait Analysis  Gait Analysis Details  Use of RW. Decreased kam. Decreased L knee flexion during swing on the LLE.          Assessment & Plan   Assessment  Heidy presents with a condition of Low complexity.   Presentation of Symptoms: Stable  Will Comorbidities Impact Care: No       Functional Limitations: Activity tolerance, Driving, Ambulating on uneven surfaces,  Sitting tolerance, Squatting, Gait limitations, Range of motion  Impairments: Abnormal gait, Impaired physical strength  Personal Factors Affecting Prognosis: Pain    Patient Goal for Therapy (PT): get back to cutting grass with zero turn   Assessment Details: Pt with L TKA and decreased L knee ROM and strength. She is limited in mobility and flexibility in the LLE.     Plan  From a physical therapy perspective, the patient would benefit from: Skilled Rehab Services    Planned therapy interventions include: Therapeutic exercise, Therapeutic activities, Neuromuscular re-education, Manual therapy, and Gait training.    Planned modalities to include: Electrical stimulation - attended, Electrical stimulation - passive/unattended, Cryotherapy (cold pack), and Thermotherapy (hot pack).        Visit Frequency: 3 times Per Week for 4 Weeks.       This plan was discussed with Patient.   Discussion participants: Agreed Upon Plan of Care             Patient's spiritual, cultural, and educational needs considered and patient agreeable to plan of care and goals.           Goals:   Active       Long term goals       Pt will be independent with progressive HEP       Start:  02/24/25    Expected End:  03/21/25            Pt will increase FOTO score to 73 by DC in order to demonstrate an increase in functional ability       Start:  02/24/25    Expected End:  03/21/25            Pt will be able to ambulate 300 or more feet with no AD and no to minimal gait deficits       Start:  02/24/25    Expected End:  03/21/25            Pt will increase L knee ROM in order to make gait pattern more efficient        Start:  02/24/25    Expected End:  03/21/25            Pt will increase strength in LLE to grossly 5/5 in order to make gait pattern more efficient       Start:  02/24/25    Expected End:  03/21/25            Pt will be able to squat to retrieve object from the floor       Start:  02/24/25    Expected End:  03/21/25                Short term goals       Pt will be able to ambulate 200 feet with least assistive AD and no to minimal gait deficits       Start:  02/24/25    Expected End:  03/07/25            Pt will increase ROM in the L knee in order to make LE dressing more efficient       Start:  02/24/25    Expected End:  03/07/25              Clinical Information for Insurance Authorization     Dates of Services: 2/24/2025 to 03/21/2025  Discharge Plan: Patient will be discharged from skilled physical therapy treatment once all goals have been met, patient has plateaued, or physician/insurance requests discontinuation of care. Patient will be discharged with a home exercise program.   Type of therapy: Rehabilitative  ICD-10 Diagnosis Code(s): Knee - Pain in L knee - M25.562, Stiffness of L knee - M25.662, and Presence of L artificial knee joint - Z96.652 and General - Weakness - R53.1, Difficulty walking - R26.2, and Orthopedic aftercare - Z47.89   Which side is symptomatic? Left  Surgical: Yes  Surgical procedure:  L TKA  Surgery date:  01/30/2025 .  Presenting symptoms/diagnoses are traumatic in nature.  Presenting symptoms are non-radiating in nature.   The rehabilitation is not related to a diagnosis of cancer.  The rehabilitation is not related to a diagnosis of lymphedema.  Patient's clinical presentation is:  Moderate objective and functional deficits: consistent symptoms and/or symptoms that are intensified with activity with moderate loss of range of motion, strength, or ability to perform daily tasks  CPT Codes Requested:  25346 [therapeutic exercise], 17530 [neuromuscular re-education], 60422 [gait training], 48063 [manual therapy], 25128 [therapeutic activities], 09626 [attended electrical stimulation], 52461 [unattended electrical stimulation], and 33116 [vasopneumatic device]        Silviano Ch PT, DPT  2/24/2025

## 2025-02-24 NOTE — PATIENT INSTRUCTIONS
Access Code: [email or web address]  URL: [email or web address]  Date: 02/24/2025  Prepared by: Silviano Ch    Exercises  - Seated Knee Extension Stretch with Chair  - 5 min hold  - Supine Quadricep Sets  - 3 sets - 10 reps - 5 sec hold  - Small Range Straight Leg Raise  - 3 sets - 10 reps  - Supine Short Arc Quad  - 3 sets - 10 reps - 3 sec hold  - Gastroc Stretch on Wall  - 3 reps - 30 sec hold  - Seated Hamstring Stretch  - 3 reps - 30 sec hold  - Seated Heel Slide  - 3 sets - 10 reps

## 2025-03-03 ENCOUNTER — CLINICAL SUPPORT (OUTPATIENT)
Dept: REHABILITATION | Facility: HOSPITAL | Age: 65
End: 2025-03-03
Payer: MEDICARE

## 2025-03-03 DIAGNOSIS — R26.2 DIFFICULTY WALKING: ICD-10-CM

## 2025-03-03 DIAGNOSIS — Z96.652 AFTERCARE FOLLOWING LEFT KNEE JOINT REPLACEMENT SURGERY: Primary | ICD-10-CM

## 2025-03-03 DIAGNOSIS — M25.562 ACUTE PAIN OF LEFT KNEE: ICD-10-CM

## 2025-03-03 DIAGNOSIS — Z47.1 AFTERCARE FOLLOWING LEFT KNEE JOINT REPLACEMENT SURGERY: Primary | ICD-10-CM

## 2025-03-03 DIAGNOSIS — R53.1 WEAKNESS: ICD-10-CM

## 2025-03-03 PROCEDURE — 97112 NEUROMUSCULAR REEDUCATION: CPT | Mod: KX

## 2025-03-03 PROCEDURE — 97110 THERAPEUTIC EXERCISES: CPT

## 2025-03-03 NOTE — PROGRESS NOTES
Outpatient Rehab    Physical Therapy Visit    Patient Name: Heidy Moyer  MRN: [id number]  YOB: 1960  Encounter Date: 3/3/2025    Therapy Diagnosis:   Encounter Diagnoses   Name Primary?    Aftercare following left knee joint replacement surgery Yes    Acute pain of left knee     Weakness     Difficulty walking      Physician: Haley Leonard PA    Physician Orders: Eval and Treat  Medical Diagnosis: L TKA     Visit # / Visits Authorized:  2 / 6 visits approved    Date of Evaluation:  2/24/2025   Insurance Authorization Period: 2/24/2025 to 03/21/2025  Plan of Care Certification:  2/24/2025 to 03/21/2025       Time In: 1025   Time Out: 1100  Total Time: 35   Total Billable Time: 35 minutes    FOTO:  Intake Score:  %  Survey Score 1:  %  Survey Score 2:  %         Subjective   Pt reports stiffness in the knee this morning. Says that she has been able to go up and down her (20) steps at home fairly well. She also says that she was able to go up steps at [employer] yesterday..         Objective       Knee Range of Motion   Left Knee   Active (deg) Passive (deg) Pain   Flexion 101       Extension 0           No lag with SLR.            Treatment:  Therapeutic Exercise  Therapeutic Exercise Activity 1: nustep: 6 min  Therapeutic Exercise Activity 2: wedge: 2 min  Therapeutic Exercise Activity 3: HS stretch: 3 x 30 sec hold  Therapeutic Exercise Activity 4: knee flex stretch: 3 x 30 sec hold  Therapeutic Exercise Activity 5: heel raises: 2 x 15  Therapeutic Exercise Activity 6: standing hip ext and abd: 15 each way, each LE  Therapeutic Exercise Activity 7: heel slides: 2 min, 5 ankle pumps each    Balance/Neuromuscular Re-Education  Balance/Neuromuscular Re-Education Activity 1: SLR: 20  Balance/Neuromuscular Re-Education Activity 2: SAQ: 20 x 3 sec hold    Assessment & Plan   Assessment: Pt attending first follow up since IE. She is able to tolerate addition of several new exercises/activities  for general knee mobility and flexibility. She is able to tolerate some LE strengthening as well. Will continue to progress as able and as tolerated.       Patient will continue to benefit from skilled outpatient physical therapy to address the deficits listed in the problem list box on initial evaluation, provide pt/family education and to maximize pt's level of independence in the home and community environment.     Patient's spiritual, cultural, and educational needs considered and patient agreeable to plan of care and goals.           Plan: Will progress to pt's tolerance and ability in coming visits.    Goals:   Active       Long term goals       Pt will be independent with progressive HEP       Start:  02/24/25    Expected End:  03/21/25            Pt will increase FOTO score to 73 by DC in order to demonstrate an increase in functional ability       Start:  02/24/25    Expected End:  03/21/25            Pt will be able to ambulate 300 or more feet with no AD and no to minimal gait deficits       Start:  02/24/25    Expected End:  03/21/25            Pt will increase L knee ROM in order to make gait pattern more efficient        Start:  02/24/25    Expected End:  03/21/25            Pt will increase strength in LLE to grossly 5/5 in order to make gait pattern more efficient       Start:  02/24/25    Expected End:  03/21/25            Pt will be able to squat to retrieve object from the floor       Start:  02/24/25    Expected End:  03/21/25               Short term goals       Pt will be able to ambulate 200 feet with least assistive AD and no to minimal gait deficits       Start:  02/24/25    Expected End:  03/07/25            Pt will increase ROM in the L knee in order to make LE dressing more efficient       Start:  02/24/25    Expected End:  03/07/25                Silviano Ch, PT, DPT  3/3/2025

## 2025-03-05 ENCOUNTER — CLINICAL SUPPORT (OUTPATIENT)
Dept: REHABILITATION | Facility: HOSPITAL | Age: 65
End: 2025-03-05
Payer: MEDICARE

## 2025-03-05 DIAGNOSIS — R26.2 DIFFICULTY WALKING: ICD-10-CM

## 2025-03-05 DIAGNOSIS — Z96.652 AFTERCARE FOLLOWING LEFT KNEE JOINT REPLACEMENT SURGERY: Primary | ICD-10-CM

## 2025-03-05 DIAGNOSIS — Z47.1 AFTERCARE FOLLOWING LEFT KNEE JOINT REPLACEMENT SURGERY: Primary | ICD-10-CM

## 2025-03-05 DIAGNOSIS — M25.562 ACUTE PAIN OF LEFT KNEE: ICD-10-CM

## 2025-03-05 DIAGNOSIS — R53.1 WEAKNESS: ICD-10-CM

## 2025-03-05 PROCEDURE — 97112 NEUROMUSCULAR REEDUCATION: CPT | Mod: CQ

## 2025-03-05 PROCEDURE — 97110 THERAPEUTIC EXERCISES: CPT | Mod: CQ

## 2025-03-05 PROCEDURE — 97530 THERAPEUTIC ACTIVITIES: CPT | Mod: CQ

## 2025-03-05 NOTE — PROGRESS NOTES
"  Outpatient Rehab    Physical Therapy Visit    Patient Name: Heidy Moyer  MRN: [id number]  YOB: 1960  Encounter Date: 3/5/2025    Therapy Diagnosis:   Encounter Diagnoses   Name Primary?    Aftercare following left knee joint replacement surgery Yes    Acute pain of left knee     Weakness     Difficulty walking      Physician: Haley Leonard PA    Physician Orders: Eval and Treat  Medical Diagnosis: L TKA     Visit # / Visits Authorized:  2 / 8 visits approved    Date of Evaluation:  2/24/2025   Insurance Authorization Period: 2/24/2025 to 03/27/2025  Plan of Care Certification:  2/24/2025 to 03/21/2025      Time In: 1529   Time Out: 1613  Total Time: 44   Total Billable Time: 44 minutes    FOTO:  Intake Score:  %  Survey Score 1:  %  Survey Score 2:  %         Subjective   Patient states she is doing well today with no complaints.  Pain reported as 0/10.      Objective            Treatment:  Therapeutic Exercise  Therapeutic Exercise Activity 1: nustep: 6 min  Therapeutic Exercise Activity 2: Slant board stretch: 2 minutes  Therapeutic Exercise Activity 3: HS stretch: 3 x 30 sec hold  Therapeutic Exercise Activity 4: knee flex stretch: 3 x 30 sec hold  Therapeutic Exercise Activity 5: heel raises: 2 x 15  Therapeutic Exercise Activity 6: standing hip ext and abd: 15 each way, each LE  Therapeutic Exercise Activity 7: heel slides: 3 min, 5 ankle pumps each  Therapeutic Exercise Activity 8: LAQ (left): x 20 reps    Balance/Neuromuscular Re-Education  Balance/Neuromuscular Re-Education Activity 1: SLR (left): 20 reps  Balance/Neuromuscular Re-Education Activity 2: SAQ: 20 x 3 sec hold    Therapeutic Activity  Therapeutic Activity 1: Chair squats: 20 reps  Therapeutic Activity 2: 6" forward step ups: 20 reps    Assessment & Plan   Assessment: Patient with no new complaints upon arrival. She had good tolerance and ability to perform each intervention today without complaint of increase in " pain. She had good tolernace of added step ups and chair squats this visit. Will continue to progress as able and as tolerated.  Evaluation/Treatment Tolerance: Patient tolerated treatment well    Patient will continue to benefit from skilled outpatient physical therapy to address the deficits listed in the problem list box on initial evaluation, provide pt/family education and to maximize pt's level of independence in the home and community environment.     Patient's spiritual, cultural, and educational needs considered and patient agreeable to plan of care and goals.           Plan: Will progress to pt's tolerance and ability in coming visits.    Goals:   Active       Long term goals       Pt will be independent with progressive HEP (Progressing)       Start:  02/24/25    Expected End:  03/21/25            Pt will increase FOTO score to 73 by DC in order to demonstrate an increase in functional ability (Progressing)       Start:  02/24/25    Expected End:  03/21/25            Pt will be able to ambulate 300 or more feet with no AD and no to minimal gait deficits (Progressing)       Start:  02/24/25    Expected End:  03/21/25            Pt will increase L knee ROM in order to make gait pattern more efficient  (Progressing)       Start:  02/24/25    Expected End:  03/21/25            Pt will increase strength in LLE to grossly 5/5 in order to make gait pattern more efficient (Progressing)       Start:  02/24/25    Expected End:  03/21/25            Pt will be able to squat to retrieve object from the floor (Progressing)       Start:  02/24/25    Expected End:  03/21/25               Short term goals       Pt will be able to ambulate 200 feet with least assistive AD and no to minimal gait deficits (Progressing)       Start:  02/24/25    Expected End:  03/07/25            Pt will increase ROM in the L knee in order to make LE dressing more efficient (Progressing)       Start:  02/24/25    Expected End:  03/07/25                 Mane Espinal, PATRICIA, TA

## 2025-03-10 ENCOUNTER — CLINICAL SUPPORT (OUTPATIENT)
Dept: REHABILITATION | Facility: HOSPITAL | Age: 65
End: 2025-03-10
Payer: MEDICARE

## 2025-03-10 DIAGNOSIS — Z47.1 AFTERCARE FOLLOWING LEFT KNEE JOINT REPLACEMENT SURGERY: Primary | ICD-10-CM

## 2025-03-10 DIAGNOSIS — R26.2 DIFFICULTY WALKING: ICD-10-CM

## 2025-03-10 DIAGNOSIS — R53.1 WEAKNESS: ICD-10-CM

## 2025-03-10 DIAGNOSIS — M25.562 ACUTE PAIN OF LEFT KNEE: ICD-10-CM

## 2025-03-10 DIAGNOSIS — Z96.652 AFTERCARE FOLLOWING LEFT KNEE JOINT REPLACEMENT SURGERY: Primary | ICD-10-CM

## 2025-03-10 PROCEDURE — 97110 THERAPEUTIC EXERCISES: CPT | Mod: CQ

## 2025-03-10 PROCEDURE — 97112 NEUROMUSCULAR REEDUCATION: CPT | Mod: CQ

## 2025-03-10 PROCEDURE — 97530 THERAPEUTIC ACTIVITIES: CPT | Mod: CQ

## 2025-03-10 NOTE — PROGRESS NOTES
"  Outpatient Rehab    Physical Therapy Visit    Patient Name: Heidy Moyer  MRN: [id number]  YOB: 1960  Encounter Date: 3/10/2025    Therapy Diagnosis:   Encounter Diagnoses   Name Primary?    Aftercare following left knee joint replacement surgery Yes    Acute pain of left knee     Weakness     Difficulty walking      Physician: Haley Leonard PA    Physician Orders: Eval and Treat  Medical Diagnosis: L TKA     Visit # / Visits Authorized:  3 / 8 visits approved    Date of Evaluation:  2/24/2025   Insurance Authorization Period: 2/24/2025 to 03/27/2025  Plan of Care Certification:  2/24/2025 to 03/21/2025      Time In: 1531   Time Out: 1610  Total Time: 39   Total Billable Time: 39     Subjective   Doing good with no pain or complaints..  Pain reported as 0/10.      Objective          Treatment:  Therapeutic Exercise  Therapeutic Exercise Activity 1: nustep: 6 min  Therapeutic Exercise Activity 2: Slant board stretch: 2 minutes  Therapeutic Exercise Activity 3: HS stretch: 3 x 30 sec hold  Therapeutic Exercise Activity 4: knee flex stretch: 3 x 30 sec hold  Therapeutic Exercise Activity 5: heel raises: 2 x 15  Therapeutic Exercise Activity 6: standing hip ext and abd: 15 each way, each LE  Therapeutic Exercise Activity 7: heel slides: 3 min, 5 ankle pumps each  Therapeutic Exercise Activity 8: LAQ (left): x 20 reps    Balance/Neuromuscular Re-Education  Balance/Neuromuscular Re-Education Activity 1: SLR (left): 20 reps  Balance/Neuromuscular Re-Education Activity 2: SAQ: 20 x 3 sec hold    Therapeutic Activity  Therapeutic Activity 1: Chair squats: 20 reps  Therapeutic Activity 2: 6" forward step ups: 20 reps    Assessment & Plan   Assessment:    Evaluation/Treatment Tolerance: Patient tolerated treatment well    Patient will continue to benefit from skilled outpatient physical therapy to address the deficits listed in the problem list box on initial evaluation, provide pt/family " education and to maximize pt's level of independence in the home and community environment.     Patient's spiritual, cultural, and educational needs considered and patient agreeable to plan of care and goals.       Plan: Continue with appropriate POC.    Goals:   Active       Long term goals       Pt will be independent with progressive HEP (Progressing)       Start:  02/24/25    Expected End:  03/21/25            Pt will increase FOTO score to 73 by DC in order to demonstrate an increase in functional ability (Progressing)       Start:  02/24/25    Expected End:  03/21/25            Pt will be able to ambulate 300 or more feet with no AD and no to minimal gait deficits (Progressing)       Start:  02/24/25    Expected End:  03/21/25            Pt will increase L knee ROM in order to make gait pattern more efficient  (Progressing)       Start:  02/24/25    Expected End:  03/21/25            Pt will increase strength in LLE to grossly 5/5 in order to make gait pattern more efficient (Progressing)       Start:  02/24/25    Expected End:  03/21/25            Pt will be able to squat to retrieve object from the floor (Progressing)       Start:  02/24/25    Expected End:  03/21/25               Short term goals       Pt will be able to ambulate 200 feet with least assistive AD and no to minimal gait deficits (Progressing)       Start:  02/24/25    Expected End:  03/07/25            Pt will increase ROM in the L knee in order to make LE dressing more efficient (Progressing)       Start:  02/24/25    Expected End:  03/07/25                Karla Palomo, PTA     Dr. Main

## 2025-03-14 ENCOUNTER — CLINICAL SUPPORT (OUTPATIENT)
Dept: REHABILITATION | Facility: HOSPITAL | Age: 65
End: 2025-03-14
Payer: MEDICARE

## 2025-03-14 DIAGNOSIS — M25.562 ACUTE PAIN OF LEFT KNEE: ICD-10-CM

## 2025-03-14 DIAGNOSIS — Z47.1 AFTERCARE FOLLOWING LEFT KNEE JOINT REPLACEMENT SURGERY: Primary | ICD-10-CM

## 2025-03-14 DIAGNOSIS — R53.1 WEAKNESS: ICD-10-CM

## 2025-03-14 DIAGNOSIS — R26.2 DIFFICULTY WALKING: ICD-10-CM

## 2025-03-14 DIAGNOSIS — Z96.652 AFTERCARE FOLLOWING LEFT KNEE JOINT REPLACEMENT SURGERY: Primary | ICD-10-CM

## 2025-03-14 PROCEDURE — 97530 THERAPEUTIC ACTIVITIES: CPT | Mod: CQ

## 2025-03-14 PROCEDURE — 97112 NEUROMUSCULAR REEDUCATION: CPT | Mod: CQ

## 2025-03-14 PROCEDURE — 97110 THERAPEUTIC EXERCISES: CPT | Mod: CQ

## 2025-03-14 NOTE — PROGRESS NOTES
"  Outpatient Rehab    Physical Therapy Visit    Patient Name: Heidy Moyer  MRN: [id number]  YOB: 1960  Encounter Date: 3/14/2025    Therapy Diagnosis:   Encounter Diagnoses   Name Primary?    Aftercare following left knee joint replacement surgery Yes    Acute pain of left knee     Weakness     Difficulty walking      Physician: Haley Leonard PA    Physician Orders: Eval and Treat  Medical Diagnosis: L TKA     Visit # / Visits Authorized:  4 / 8 visits approved    Date of Evaluation:  2/24/2025   Insurance Authorization Period: 2/24/2025 to 03/27/2025  Plan of Care Certification:  2/24/2025 to 03/21/2025      Time In: 1225   Time Out: 1308  Total Time: 43   Total Billable Time: 43 minutes     Subjective   Patient reports to be doing well; she still has increased swelling at times that comes and goes depending on how much she does that day.  Pain reported as 0/10.      Objective          Treatment:  Therapeutic Exercise  Therapeutic Exercise Activity 1: nustep: 6 min  Therapeutic Exercise Activity 2: Slant board stretch: 2 minutes  Therapeutic Exercise Activity 3: HS stretch: 3 x 30 sec hold  Therapeutic Exercise Activity 4: knee flex stretch: 3 x 30 sec hold  Therapeutic Exercise Activity 5: heel raises on 6" step: 2 x 15  Therapeutic Exercise Activity 6: standing hip ext and abd: 15 each way, each LE  Therapeutic Exercise Activity 7: heel slides: 3 min, 5 ankle pumps each    Balance/Neuromuscular Re-Education  Balance/Neuromuscular Re-Education Activity 1: SLR (left): 20 reps; 2#  Balance/Neuromuscular Re-Education Activity 2: SAQ: 20 x 3 sec hold; 2#  Balance/Neuromuscular Re-Education Activity 3: Quad sets: 20 x 3 sec hold    Therapeutic Activity  Therapeutic Activity 1: Chair squats: 20 reps  Therapeutic Activity 2: 6" forward step ups: 20 reps    Assessment & Plan   Assessment: Patient with good effort throughout treatment. She was able to perform a continuation of " exercises/activities from previous treatment with good tolerance of each and no complaints. She is progressing with knee flexion nicely and was able to perform saq and slr with added weight today. Will continue to progress patient as tolerated and as able.  Evaluation/Treatment Tolerance: Patient tolerated treatment well    Patient will continue to benefit from skilled outpatient physical therapy to address the deficits listed in the problem list box on initial evaluation, provide pt/family education and to maximize pt's level of independence in the home and community environment.     Patient's spiritual, cultural, and educational needs considered and patient agreeable to plan of care and goals.       Plan: Continue with appropriate POC.    Goals:   Active       Long term goals       Pt will be independent with progressive HEP (Progressing)       Start:  02/24/25    Expected End:  03/21/25            Pt will increase FOTO score to 73 by DC in order to demonstrate an increase in functional ability (Progressing)       Start:  02/24/25    Expected End:  03/21/25            Pt will be able to ambulate 300 or more feet with no AD and no to minimal gait deficits (Progressing)       Start:  02/24/25    Expected End:  03/21/25            Pt will increase L knee ROM in order to make gait pattern more efficient  (Progressing)       Start:  02/24/25    Expected End:  03/21/25            Pt will increase strength in LLE to grossly 5/5 in order to make gait pattern more efficient (Progressing)       Start:  02/24/25    Expected End:  03/21/25            Pt will be able to squat to retrieve object from the floor (Progressing)       Start:  02/24/25    Expected End:  03/21/25               Short term goals       Pt will be able to ambulate 200 feet with least assistive AD and no to minimal gait deficits (Progressing)       Start:  02/24/25    Expected End:  03/07/25            Pt will increase ROM in the L knee in order to make LE  dressing more efficient (Progressing)       Start:  02/24/25    Expected End:  03/07/25                SEYMOUR Ingram

## 2025-03-17 ENCOUNTER — CLINICAL SUPPORT (OUTPATIENT)
Dept: REHABILITATION | Facility: HOSPITAL | Age: 65
End: 2025-03-17
Payer: MEDICARE

## 2025-03-17 DIAGNOSIS — Z96.652 AFTERCARE FOLLOWING LEFT KNEE JOINT REPLACEMENT SURGERY: Primary | ICD-10-CM

## 2025-03-17 DIAGNOSIS — M25.562 ACUTE PAIN OF LEFT KNEE: ICD-10-CM

## 2025-03-17 DIAGNOSIS — R53.1 WEAKNESS: ICD-10-CM

## 2025-03-17 DIAGNOSIS — R26.2 DIFFICULTY WALKING: ICD-10-CM

## 2025-03-17 DIAGNOSIS — Z47.1 AFTERCARE FOLLOWING LEFT KNEE JOINT REPLACEMENT SURGERY: Primary | ICD-10-CM

## 2025-03-17 PROCEDURE — 97110 THERAPEUTIC EXERCISES: CPT

## 2025-03-17 PROCEDURE — 97112 NEUROMUSCULAR REEDUCATION: CPT

## 2025-03-17 NOTE — PROGRESS NOTES
"  Outpatient Rehab    Physical Therapy Visit    Patient Name: Heidy Moyer  MRN: [id number]  YOB: 1960  Encounter Date: 3/17/2025    Therapy Diagnosis:   Encounter Diagnoses   Name Primary?    Aftercare following left knee joint replacement surgery Yes    Acute pain of left knee     Weakness     Difficulty walking      Physician: Haley Leonard PA    Physician Orders: Eval and Treat  Medical Diagnosis: History of arthroplasty of left knee    Visit # / Visits Authorized:  5 / 8  Date of Evaluation: 2/24/25  Insurance Authorization Period: 2/27/2025 to 3/27/2025  Plan of Care Certification:   2/24/2025 to 03/21/2025      PT/PTA: PT   Number of PTA visits since last PT visit:0  Time In: 1400   Time Out: 1450  Total Time: 50   Total Billable Time: 50         Subjective   Patient states she is doing well today, no pain and she has [place] in [place] some today..  Pain reported as 0/10.      Objective            Treatment:  Therapeutic Exercise  TE 1: nustep: 6 min 2.5 #  TE 2: Slant board stretch: 2 minutes  TE 3: HS stretch: 3 x 30 sec hold  TE 4: knee flex stretch: 3 x 30 sec hold  TE 6: standing hip ext and abd: 20 each way, each LE  TE 7: heel slides: 15 x 10 sec hold  TE 8: LAQ (left): x 20 reps  Balance/Neuromuscular Re-Education  NMR 1: SLR (left): 20 reps; 2#  NMR 2: SAQ: 30 x 3 sec hold; 2#  NMR 3: Quad sets: 30 x 3 sec hold  NMR 4: bridges x 20  Therapeutic Activity  TA 1: sit to stand box squat elevated mat: 20 reps  TA 2: 6" forward step ups: 20 reps  TA 3: 6 " lateral step ups x 20 reps    Time Entry(in minutes):  Neuromuscular Re-Education Time Entry: 15  Therapeutic Activity Time Entry: 10  Therapeutic Exercise Time Entry: 25    Assessment & Plan   Assessment: Patient presenting today with no pain and showing range of motion increase measured at -4-110 today. She tolerated increased standing interventions well with no complaints. Will continue progressing as " tolerated.  Evaluation/Treatment Tolerance: Patient tolerated treatment well    Patient will continue to benefit from skilled outpatient physical therapy to address the deficits listed in the problem list box on initial evaluation, provide pt/family education and to maximize pt's level of independence in the home and community environment.     Patient's spiritual, cultural, and educational needs considered and patient agreeable to plan of care and goals.           Plan: Continue with appropriate POC.    Goals:   Active       Long term goals       Pt will be independent with progressive HEP (Progressing)       Start:  02/24/25    Expected End:  03/21/25            Pt will increase FOTO score to 73 by DC in order to demonstrate an increase in functional ability (Progressing)       Start:  02/24/25    Expected End:  03/21/25            Pt will be able to ambulate 300 or more feet with no AD and no to minimal gait deficits (Progressing)       Start:  02/24/25    Expected End:  03/21/25            Pt will increase L knee ROM in order to make gait pattern more efficient  (Progressing)       Start:  02/24/25    Expected End:  03/21/25            Pt will increase strength in LLE to grossly 5/5 in order to make gait pattern more efficient (Progressing)       Start:  02/24/25    Expected End:  03/21/25            Pt will be able to squat to retrieve object from the floor (Progressing)       Start:  02/24/25    Expected End:  03/21/25               Short term goals       Pt will be able to ambulate 200 feet with least assistive AD and no to minimal gait deficits (Progressing)       Start:  02/24/25    Expected End:  03/07/25            Pt will increase ROM in the L knee in order to make LE dressing more efficient (Progressing)       Start:  02/24/25    Expected End:  03/07/25                Fox Reddy, PT, DPT

## 2025-03-21 ENCOUNTER — CLINICAL SUPPORT (OUTPATIENT)
Dept: REHABILITATION | Facility: HOSPITAL | Age: 65
End: 2025-03-21
Payer: MEDICARE

## 2025-03-21 DIAGNOSIS — R53.1 WEAKNESS: ICD-10-CM

## 2025-03-21 DIAGNOSIS — Z96.652 AFTERCARE FOLLOWING LEFT KNEE JOINT REPLACEMENT SURGERY: Primary | ICD-10-CM

## 2025-03-21 DIAGNOSIS — Z47.1 AFTERCARE FOLLOWING LEFT KNEE JOINT REPLACEMENT SURGERY: Primary | ICD-10-CM

## 2025-03-21 DIAGNOSIS — R26.2 DIFFICULTY WALKING: ICD-10-CM

## 2025-03-21 DIAGNOSIS — M25.562 ACUTE PAIN OF LEFT KNEE: ICD-10-CM

## 2025-03-21 PROCEDURE — 97110 THERAPEUTIC EXERCISES: CPT | Mod: CQ

## 2025-03-21 PROCEDURE — 97112 NEUROMUSCULAR REEDUCATION: CPT | Mod: CQ

## 2025-03-21 PROCEDURE — 97530 THERAPEUTIC ACTIVITIES: CPT | Mod: CQ

## 2025-03-21 NOTE — PROGRESS NOTES
"  Outpatient Rehab    Physical Therapy Visit    Patient Name: Heidy Moyer  MRN: [id number]  YOB: 1960  Encounter Date: 3/21/2025    Therapy Diagnosis:   Encounter Diagnoses   Name Primary?    Aftercare following left knee joint replacement surgery Yes    Acute pain of left knee     Weakness     Difficulty walking      Physician: Haley Leonard PA    Physician Orders: Eval and Treat  Medical Diagnosis: History of arthroplasty of left knee    Visit # / Visits Authorized:  6 / 8  Date of Evaluation: 2/24/25  Insurance Authorization Period: 2/27/2025 to 3/27/2025  Plan of Care Certification:   2/24/2025 to 03/21/2025      PT/PTA: PTA   Number of PTA visits since last PT visit:1  Time In: 0844   Time Out: 0937  Total Time: 53   Total Billable Time: 53         Subjective   Patient states she has no pain and just stiffness in both knees due to colder weather. She returns to her MD on 3/24/25..  Pain reported as 0/10.      Objective       Knee Range of Motion   Right Knee   Active (deg) Passive (deg) Pain   Flexion 115       Extension 5           Left Knee   Active (deg) Passive (deg) Pain   Flexion 110       Extension -4                       Treatment:  Therapeutic Exercise  TE 1: Recumbent bike: 6 minutes for strength and ROM  TE 2: Slant board stretch: 2 minutes  TE 3: HS stretch: 3 x 30 sec hold  TE 4: knee flex stretch: 3 x 30 sec hold  TE 5: heel raises on 6" step: 2 x 15  TE 6: standing hip ext and abd: 20 each way, each LE  TE 7: heel slides: 15 x 10 sec hold  TE 8: LAQ (left): x 20 reps; 2#  Balance/Neuromuscular Re-Education  NMR 1: SLR (left): 20 reps; 2#  NMR 2: SAQ: 30 x 3 sec hold; 2#  NMR 3: Quad sets: 20 x 3 sec hold  NMR 4: bridges x 20  NMR 5: Single leg stance (left): 10 reps x 10 sec hold  Therapeutic Activity  TA 1: sit to stand box squat elevated mat: 20 reps  TA 2: 6" forward step ups: 20 reps  TA 3: 6 " lateral step ups x 20 reps    Time Entry(in minutes):  Neuromuscular " Re-Education Time Entry: 16  Therapeutic Activity Time Entry: 12  Therapeutic Exercise Time Entry: 25    Assessment & Plan   Assessment: Patient presenting today with no pain and just c/o stiffness. She was able to perform all interventions with no complaints of increase in pain noted. She had good tolerance of added SLS today to improve knee stability. She returns to her MD on 3/24. Will continue to progress patient as tolerated and as able.  Evaluation/Treatment Tolerance: Patient tolerated treatment well    Patient will continue to benefit from skilled outpatient physical therapy to address the deficits listed in the problem list box on initial evaluation, provide pt/family education and to maximize pt's level of independence in the home and community environment.     Patient's spiritual, cultural, and educational needs considered and patient agreeable to plan of care and goals.           Plan: Continue with appropriate POC.    Goals:   Active       Long term goals       Pt will be independent with progressive HEP (Progressing)       Start:  02/24/25    Expected End:  03/21/25            Pt will increase FOTO score to 73 by DC in order to demonstrate an increase in functional ability (Progressing)       Start:  02/24/25    Expected End:  03/21/25            Pt will be able to ambulate 300 or more feet with no AD and no to minimal gait deficits (Progressing)       Start:  02/24/25    Expected End:  03/21/25            Pt will increase L knee ROM in order to make gait pattern more efficient  (Progressing)       Start:  02/24/25    Expected End:  03/21/25            Pt will increase strength in LLE to grossly 5/5 in order to make gait pattern more efficient (Progressing)       Start:  02/24/25    Expected End:  03/21/25            Pt will be able to squat to retrieve object from the floor (Progressing)       Start:  02/24/25    Expected End:  03/21/25               Short term goals       Pt will be able to ambulate  200 feet with least assistive AD and no to minimal gait deficits (Progressing)       Start:  02/24/25    Expected End:  03/07/25            Pt will increase ROM in the L knee in order to make LE dressing more efficient (Progressing)       Start:  02/24/25    Expected End:  03/07/25                SEYMOUR Ingram

## 2025-03-24 ENCOUNTER — OFFICE VISIT (OUTPATIENT)
Dept: ORTHOPEDICS | Facility: CLINIC | Age: 65
End: 2025-03-24
Payer: MEDICARE

## 2025-03-24 ENCOUNTER — CLINICAL SUPPORT (OUTPATIENT)
Dept: REHABILITATION | Facility: HOSPITAL | Age: 65
End: 2025-03-24
Payer: MEDICARE

## 2025-03-24 ENCOUNTER — HOSPITAL ENCOUNTER (OUTPATIENT)
Dept: RADIOLOGY | Facility: HOSPITAL | Age: 65
Discharge: HOME | End: 2025-03-24
Attending: ORTHOPAEDIC SURGERY
Payer: MEDICARE

## 2025-03-24 VITALS
HEIGHT: 68 IN | BODY MASS INDEX: 38.19 KG/M2 | OXYGEN SATURATION: 96 % | HEART RATE: 80 BPM | SYSTOLIC BLOOD PRESSURE: 132 MMHG | WEIGHT: 252 LBS | DIASTOLIC BLOOD PRESSURE: 82 MMHG

## 2025-03-24 DIAGNOSIS — R53.1 WEAKNESS: ICD-10-CM

## 2025-03-24 DIAGNOSIS — Z47.1 AFTERCARE FOLLOWING LEFT KNEE JOINT REPLACEMENT SURGERY: Primary | ICD-10-CM

## 2025-03-24 DIAGNOSIS — M25.562 ACUTE PAIN OF LEFT KNEE: ICD-10-CM

## 2025-03-24 DIAGNOSIS — Z96.652 S/P TKR (TOTAL KNEE REPLACEMENT), LEFT: ICD-10-CM

## 2025-03-24 DIAGNOSIS — Z96.652 AFTERCARE FOLLOWING LEFT KNEE JOINT REPLACEMENT SURGERY: Primary | ICD-10-CM

## 2025-03-24 DIAGNOSIS — R26.2 DIFFICULTY WALKING: ICD-10-CM

## 2025-03-24 DIAGNOSIS — Z96.652 S/P TKR (TOTAL KNEE REPLACEMENT), LEFT: Primary | ICD-10-CM

## 2025-03-24 PROCEDURE — 4010F ACE/ARB THERAPY RXD/TAKEN: CPT | Mod: CPTII,,, | Performed by: ORTHOPAEDIC SURGERY

## 2025-03-24 PROCEDURE — 3075F SYST BP GE 130 - 139MM HG: CPT | Mod: CPTII,,, | Performed by: ORTHOPAEDIC SURGERY

## 2025-03-24 PROCEDURE — 97110 THERAPEUTIC EXERCISES: CPT | Mod: CQ

## 2025-03-24 PROCEDURE — 99214 OFFICE O/P EST MOD 30 MIN: CPT | Mod: PBBFAC,25 | Performed by: ORTHOPAEDIC SURGERY

## 2025-03-24 PROCEDURE — 97112 NEUROMUSCULAR REEDUCATION: CPT | Mod: CQ

## 2025-03-24 PROCEDURE — 73560 X-RAY EXAM OF KNEE 1 OR 2: CPT | Mod: 26,LT,, | Performed by: ORTHOPAEDIC SURGERY

## 2025-03-24 PROCEDURE — 99999 PR PBB SHADOW E&M-EST. PATIENT-LVL IV: CPT | Mod: PBBFAC,,, | Performed by: ORTHOPAEDIC SURGERY

## 2025-03-24 PROCEDURE — 97530 THERAPEUTIC ACTIVITIES: CPT | Mod: CQ

## 2025-03-24 PROCEDURE — 3079F DIAST BP 80-89 MM HG: CPT | Mod: CPTII,,, | Performed by: ORTHOPAEDIC SURGERY

## 2025-03-24 PROCEDURE — 73560 X-RAY EXAM OF KNEE 1 OR 2: CPT | Mod: TC,LT

## 2025-03-24 PROCEDURE — 1101F PT FALLS ASSESS-DOCD LE1/YR: CPT | Mod: CPTII,,, | Performed by: ORTHOPAEDIC SURGERY

## 2025-03-24 PROCEDURE — 3288F FALL RISK ASSESSMENT DOCD: CPT | Mod: CPTII,,, | Performed by: ORTHOPAEDIC SURGERY

## 2025-03-24 PROCEDURE — 99024 POSTOP FOLLOW-UP VISIT: CPT | Mod: ,,, | Performed by: ORTHOPAEDIC SURGERY

## 2025-03-24 PROCEDURE — 1159F MED LIST DOCD IN RCRD: CPT | Mod: CPTII,,, | Performed by: ORTHOPAEDIC SURGERY

## 2025-03-24 RX ORDER — APIXABAN 5 MG/1
5 TABLET, FILM COATED ORAL 2 TIMES DAILY
COMMUNITY
Start: 2025-03-06

## 2025-03-24 RX ORDER — AMOXICILLIN 500 MG/1
500 TABLET, FILM COATED ORAL EVERY 12 HOURS
Qty: 4 TABLET | Refills: 3 | Status: SHIPPED | OUTPATIENT
Start: 2025-03-24 | End: 2025-04-03

## 2025-03-24 NOTE — PROGRESS NOTES
Patient is here for follow-up of her left total knee arthroplasty she is now 8 weeks out she is doing well has good motion no instability let her weightbear as tolerates finish with therapy I will follow her up in 2 months

## 2025-03-24 NOTE — PROGRESS NOTES
"  Outpatient Rehab    Physical Therapy Visit    Patient Name: Heidy Moyer  MRN: [id number]  YOB: 1960  Encounter Date: 3/24/2025    Therapy Diagnosis:   Encounter Diagnoses   Name Primary?    Aftercare following left knee joint replacement surgery Yes    Acute pain of left knee     Weakness     Difficulty walking      Physician: Haley Leonard PA    Physician Orders: Eval and Treat  Medical Diagnosis: History of arthroplasty of left knee    Visit # / Visits Authorized:  7 / 8  Date of Evaluation: 2/24/25  Insurance Authorization Period: 2/27/2025 to 3/27/2025  Plan of Care Certification:   2/24/2025 to 03/21/2025      PT/PTA: PTA   Number of PTA visits since last PT visit:2  Time In: 1314 (patient's treatment started before patient was checked in)   Time Out: 1357  Total Time: 43   Total Billable Time: 43     Subjective   Patient is doing good with no complaints. States that she went to her appoitment and her doctor reported that she is doing really good..  Pain reported as 0/10.      Objective          Treatment:  Therapeutic Exercise  TE 1: Recumbent bike: 6 minutes for strength and ROM  TE 2: Slant board stretch: 2 minutes  TE 3: HS stretch: 3 x 30 sec hold  TE 4: knee flex stretch: 3 x 30 sec hold  TE 5: heel raises on 6" step: 2 x 15  TE 6: standing hip ext and abd: 20 each way, each LE  TE 7: heel slides: 15 x 10 sec hold  TE 8: LAQ (left): x 20 reps; 2#  Balance/Neuromuscular Re-Education  NMR 1: SLR (left): 20 reps; 2#  NMR 2: SAQ: 30 x 3 sec hold; 2#  NMR 3: Quad sets: 20 x 3 sec hold  NMR 4: bridges x 20  NMR 5: Single leg stance (left): 10 reps x 10 sec hold  Therapeutic Activity  TA 1: sit to stand box squat elevated mat: 20 reps  TA 2: 6" forward step ups: 20 reps  TA 3: 6 " lateral step ups x 20 reps    Time Entry(in minutes):  Neuromuscular Re-Education Time Entry: 15  Therapeutic Activity Time Entry: 12  Therapeutic Exercise Time Entry: 16    Assessment & Plan "   Assessment:    Evaluation/Treatment Tolerance: Patient tolerated treatment well    Patient will continue to benefit from skilled outpatient physical therapy to address the deficits listed in the problem list box on initial evaluation, provide pt/family education and to maximize pt's level of independence in the home and community environment.     Patient's spiritual, cultural, and educational needs considered and patient agreeable to plan of care and goals.           Plan: Continue with appropriate POC.    Goals:   Active       Long term goals       Pt will be independent with progressive HEP (Progressing)       Start:  02/24/25    Expected End:  03/21/25            Pt will increase FOTO score to 73 by DC in order to demonstrate an increase in functional ability (Progressing)       Start:  02/24/25    Expected End:  03/21/25            Pt will be able to ambulate 300 or more feet with no AD and no to minimal gait deficits (Progressing)       Start:  02/24/25    Expected End:  03/21/25            Pt will increase L knee ROM in order to make gait pattern more efficient  (Progressing)       Start:  02/24/25    Expected End:  03/21/25            Pt will increase strength in LLE to grossly 5/5 in order to make gait pattern more efficient (Progressing)       Start:  02/24/25    Expected End:  03/21/25            Pt will be able to squat to retrieve object from the floor (Progressing)       Start:  02/24/25    Expected End:  03/21/25               Short term goals       Pt will be able to ambulate 200 feet with least assistive AD and no to minimal gait deficits (Progressing)       Start:  02/24/25    Expected End:  03/07/25            Pt will increase ROM in the L knee in order to make LE dressing more efficient (Progressing)       Start:  02/24/25    Expected End:  03/07/25                SEYMOUR Meza

## 2025-03-26 ENCOUNTER — DOCUMENTATION ONLY (OUTPATIENT)
Dept: REHABILITATION | Facility: HOSPITAL | Age: 65
End: 2025-03-26
Payer: MEDICARE

## 2025-03-26 PROBLEM — Z47.1 AFTERCARE FOLLOWING LEFT KNEE JOINT REPLACEMENT SURGERY: Status: RESOLVED | Noted: 2025-02-24 | Resolved: 2025-03-26

## 2025-03-26 PROBLEM — R53.1 WEAKNESS: Status: RESOLVED | Noted: 2025-02-24 | Resolved: 2025-03-26

## 2025-03-26 PROBLEM — M25.562 ACUTE PAIN OF LEFT KNEE: Status: RESOLVED | Noted: 2025-02-24 | Resolved: 2025-03-26

## 2025-03-26 PROBLEM — Z96.652 AFTERCARE FOLLOWING LEFT KNEE JOINT REPLACEMENT SURGERY: Status: RESOLVED | Noted: 2025-02-24 | Resolved: 2025-03-26

## 2025-03-26 PROBLEM — R26.2 DIFFICULTY WALKING: Status: RESOLVED | Noted: 2025-02-24 | Resolved: 2025-03-26

## 2025-03-26 NOTE — PROGRESS NOTES
[facility]  Physical Therapy Discharge Note    Patient Name: Heidy Moyer  MRN: [id number]  YOB: 1960     Therapy Diagnosis:        Encounter Diagnoses   Name Primary?    Aftercare following left knee joint replacement surgery Yes    Acute pain of left knee      Weakness      Difficulty walking        Physician: Haley Leonard PA     Physician Orders: Eval and Treat  Medical Diagnosis: History of arthroplasty of left knee     Visit # / Visits Authorized:  7 / 8  Date of Evaluation: 2/24/25  Insurance Authorization Period: 2/27/2025 to 3/27/2025  Plan of Care Certification:   2/24/2025 to 03/21/2025      Date of Last visit: 3/24/2025  Total Visits Received: 7    ASSESSMENT      Pt is discharging today for several reasons: met goals, utilized all insurance approved visits, and request to DC.     Discharge reason: Patient has met all of his/her goals, Patient requested discharge, and Patient has completed allowable visits authorized by insurance    Discharge FOTO Score: NA    Goals: met goals    PLAN   This patient is discharged from Physical Therapy      Silviano Ch PT, DPT    3/26/2025

## 2025-05-01 ENCOUNTER — HOSPITAL ENCOUNTER (OUTPATIENT)
Dept: RADIOLOGY | Facility: HOSPITAL | Age: 65
Discharge: HOME | End: 2025-05-01
Attending: RADIOLOGY
Payer: MEDICARE

## 2025-05-01 DIAGNOSIS — R92.8 ABNORMAL MAMMOGRAM: ICD-10-CM

## 2025-05-01 DIAGNOSIS — Z12.31 SCREENING MAMMOGRAM FOR BREAST CANCER: ICD-10-CM

## 2025-05-01 PROCEDURE — 77066 DX MAMMO INCL CAD BI: CPT | Mod: TC

## 2025-05-01 PROCEDURE — 77062 BREAST TOMOSYNTHESIS BI: CPT | Mod: 26,,, | Performed by: RADIOLOGY

## 2025-05-01 PROCEDURE — 77066 DX MAMMO INCL CAD BI: CPT | Mod: 26,,, | Performed by: RADIOLOGY

## 2025-07-02 ENCOUNTER — TELEPHONE (OUTPATIENT)
Dept: ORTHOPEDICS | Facility: CLINIC | Age: 65
End: 2025-07-02
Payer: MEDICARE

## 2025-07-02 NOTE — TELEPHONE ENCOUNTER
Copied from CRM #[id number]. Topic: General Inquiry - Patient Advice  >> Jul 2, 2025  8:32 AM Fauzia wrote:  Who Called: Heidy Moyer    Caller is requesting assistance/information from provider's office.    pT IS REQUESTING TO SPEAK W NURSE, STATES IT IS FOR A RECOMMENDATION   nO OTHER DETAILS   '  Preferred Method of Contact: Phone Call  Patient's Preferred Phone Number on File: [phone]   Best Call Back Number, if different:  Additional Information:

## 2025-07-08 ENCOUNTER — OFFICE VISIT (OUTPATIENT)
Dept: SURGERY | Facility: CLINIC | Age: 65
End: 2025-07-08
Attending: SURGERY
Payer: MEDICARE

## 2025-07-08 VITALS — WEIGHT: 251.63 LBS | BODY MASS INDEX: 38.14 KG/M2 | HEIGHT: 68 IN

## 2025-07-08 DIAGNOSIS — K43.9 VENTRAL HERNIA WITHOUT OBSTRUCTION OR GANGRENE: Primary | ICD-10-CM

## 2025-07-08 DIAGNOSIS — Z01.818 PRE-PROCEDURAL EXAMINATION: ICD-10-CM

## 2025-07-08 PROCEDURE — 99215 OFFICE O/P EST HI 40 MIN: CPT | Mod: PBBFAC | Performed by: SURGERY

## 2025-07-08 PROCEDURE — 99999 PR PBB SHADOW E&M-EST. PATIENT-LVL V: CPT | Mod: PBBFAC,,, | Performed by: SURGERY

## 2025-07-08 RX ORDER — CEFAZOLIN SODIUM 2 G/50ML
2 SOLUTION INTRAVENOUS
OUTPATIENT
Start: 2025-07-08

## 2025-07-08 RX ORDER — SODIUM CHLORIDE 9 MG/ML
INJECTION, SOLUTION INTRAVENOUS CONTINUOUS
OUTPATIENT
Start: 2025-07-08

## 2025-07-08 RX ORDER — ONDANSETRON 4 MG/1
8 TABLET, ORALLY DISINTEGRATING ORAL EVERY 8 HOURS PRN
OUTPATIENT
Start: 2025-07-08

## 2025-07-08 NOTE — PROGRESS NOTES
General Surgery History and Physical      Patient ID: Heidy Moyer is a 64 y.o. female.    Chief Complaint: Hernia      HPI:  64-year-old female who for couple of months now has had a little bulge just above her umbilicus.  It hurts anytime she eats or puts any pressure on the area.  No nausea or vomiting previous surgeries includes a hysterectomy and a gallbladder surgery.  She has never had a hernia operation before.  She denies any constant pain or discomfort.  Patient has a history of atrial fibrillation and used to take Coumadin but now takes Eliquis    Review of Systems   Constitutional:  Negative for activity change, appetite change, fatigue and fever.   HENT:  Negative for trouble swallowing.    Respiratory:  Negative for cough and shortness of breath.    Cardiovascular:  Negative for chest pain and palpitations.   Gastrointestinal:  Positive for abdominal pain and nausea. Negative for abdominal distention, blood in stool, constipation and diarrhea.   Genitourinary:  Negative for flank pain.   Musculoskeletal:  Negative for neck pain and neck stiffness.   Neurological:  Negative for weakness.       Current Medications[1]    Review of patient's allergies indicates:   Allergen Reactions    Lovastatin Other (See Comments)    Pravastatin Other (See Comments)       Past Medical History:   Diagnosis Date    Atrial fibrillation     Hyperlipidemia     Hypertension        Past Surgical History:   Procedure Laterality Date    ARTHROPLASTY, KNEE, TOTAL, USING COMPUTER-ASSISTED NAVIGATION Right 08/31/2023    Procedure: ARTHROPLASTY, KNEE, TOTAL, USING COMPUTER-ASSISTED NAVIGATION;  Surgeon: William Bravo MD;  Location: [facility];  Service: Orthopedics;  Laterality: Right;  CRYO CUFF    ARTHROPLASTY, KNEE, TOTAL, USING COMPUTER-ASSISTED NAVIGATION Left 1/30/2025    Procedure: ARTHROPLASTY, KNEE, TOTAL,  USING COMPUTER-ASSISTED NAVIGATION;  Surgeon: William Bravo MD;  Location: [facility];  Service: Orthopedics;  Laterality: Left;    BREAST BIOPSY Right      SECTION      x2    CHOLECYSTECTOMY      EYE SURGERY Left     clogged eye duct surgery (bypass)    HYSTERECTOMY      OOPHORECTOMY      TUBAL LIGATION         Family History   Problem Relation Name Age of Onset    Heart disease Mother      Heart attack Father      Breast cancer Sister      Breast cancer Sister      Breast cancer Sister         Social History[2]    There were no vitals filed for this visit.    Physical Exam  Constitutional:       General: She is not in acute distress.  HENT:      Head: Normocephalic.   Cardiovascular:      Rate and Rhythm: Normal rate and regular rhythm.      Pulses: Normal pulses.   Pulmonary:      Effort: Pulmonary effort is normal. No respiratory distress.      Breath sounds: Normal breath sounds.   Abdominal:      General: Abdomen is flat. There is no distension.      Palpations: Abdomen is soft.      Tenderness: There is no abdominal tenderness.      Hernia: A hernia (3 cm tender not just above the umbilicus) is present.   Musculoskeletal:         General: Normal range of motion.   Skin:     General: Skin is warm.   Neurological:      General: No focal deficit present.      Mental Status: She is oriented to person, place, and time.         Assessment & Plan:    Ventral hernia without obstruction or gangrene  -     Place in Outpatient; Standing  -     Case Request Operating Room: XI ROBOTIC REPAIR, VENTRAL HERNIA  -     Vital signs; Standing  -     Insert peripheral IV; Standing  -     Height and weight; Standing  -     Chlorhexidine (CHG) 2% Wipes; Standing  -     Hibiclens shower; Standing  -     Hibiclens shower; Standing  -     Diet NPO; Standing  -     Place sequential compression device; Standing  -     Pulse Oximetry Q4H; Standing  -     Full code; Standing    Pre-procedural examination  -     Basic  Metabolic Panel; Future; Expected date: 07/08/2025  -     CBC Auto Differential; Future; Expected date: 07/08/2025  -     EKG 12-lead; Future    Other orders  -     0.9% NaCl infusion  -     IP VTE LOW RISK PATIENT; Standing  -     cefazolin (ANCEF) 2 gram in dextrose 5% 50 mL IVPB (premix)  -     ondansetron disintegrating tablet 8 mg        Patient will go to the operating next month for a robotic ventral hernia repair.  Risks and benefits explained including risk of bleeding, infection, open operation, injury to surrounding organs, she is aware of the use of mesh and associated issues including but not limited to bleeding, erosion, and possibility of recurrence even with the use of mesh all questions were answered.  She will stop her Eliquis for 2 days before her procedure.            [1]   Current Outpatient Medications   Medication Sig Dispense Refill    calcium carbonate/vitamin D3 (CALTRATE 600 + D ORAL) Take 1 tablet by mouth 2 (two) times a day.      carvediloL (COREG) 25 MG tablet Take 1 tablet (25 mg total) by mouth 2 (two) times daily. 180 tablet 3    cyanocobalamin, vitamin B-12, 5,000 mcg TbDL Take 1 tablet by mouth Daily.      diltiaZEM (CARDIZEM CD) 240 MG 24 hr capsule Take 1 capsule (240 mg total) by mouth once daily. 90 capsule 3    ELIQUIS 5 mg Tab Take 5 mg by mouth 2 (two) times daily.      fenofibrate (TRICOR) 145 MG tablet Take 1 tablet (145 mg total) by mouth once daily. 90 tablet 1    hydroCHLOROthiazide (HYDRODIURIL) 12.5 MG Tab Take 2 tablets (25 mg total) by mouth once daily. 90 tablet 1    loratadine (CLARITIN) 10 mg tablet Take 10 mg by mouth once daily.      magnesium chloride (MAG 64 ORAL) Take 1 tablet by mouth 2 (two) times a day.      multivit/folic acid/vit K1 (ONE-A-DAY WOMEN'S 50 PLUS ORAL) Take 1 tablet by mouth Daily.      olmesartan (BENICAR) 40 MG tablet Take 1 tablet (40 mg total) by mouth once daily. 90 tablet 3    rosuvastatin (CRESTOR) 20 MG tablet Take 1 tablet (20 mg  total) by mouth once daily. 90 tablet 3    HYDROcodone-acetaminophen (NORCO)  mg per tablet Take 1 tablet by mouth every 6 (six) hours as needed for Pain. (Patient not taking: Reported on 7/8/2025) 28 tablet 0    mupirocin (BACTROBAN) 2 % ointment Apply topically once daily. (Patient not taking: Reported on 7/8/2025) 15 g 1    sulfamethoxazole-trimethoprim 800-160mg (BACTRIM DS) 800-160 mg Tab Take 1 tablet by mouth 2 (two) times daily. (Patient not taking: Reported on 7/8/2025) 10 tablet 0    warfarin (COUMADIN) 5 MG tablet Take 1 tablet (5 mg total) by mouth Daily. Take 1 tab daily and 0.5 tab every Wed and Sun (Patient not taking: Reported on 7/8/2025) 90 tablet 3     No current facility-administered medications for this visit.   [2]   Social History  Socioeconomic History    Marital status:    Tobacco Use    Smoking status: Never    Smokeless tobacco: Never   Substance and Sexual Activity    Alcohol use: Never    Drug use: Never     Social Drivers of Health     Financial Resource Strain: Low Risk  (1/30/2025)    Overall Financial Resource Strain (CARDIA)     Difficulty of Paying Living Expenses: Not hard at all   Food Insecurity: No Food Insecurity (1/30/2025)    Hunger Vital Sign     Worried About Running Out of Food in the Last Year: Never true     Ran Out of Food in the Last Year: Never true   Transportation Needs: No Transportation Needs (1/30/2025)    TRANSPORTATION NEEDS     Transportation : No   Physical Activity: Inactive (1/30/2025)    Exercise Vital Sign     Days of Exercise per Week: 0 days     Minutes of Exercise per Session: 0 min   Stress: No Stress Concern Present (1/30/2025)    [employer] - Occupational Stress Questionnaire     Feeling of Stress : Not at all   Housing Stability: Unknown (1/30/2025)    Housing Stability Vital Sign     Unable to Pay for Housing in the Last Year: No     Homeless in the Last Year: No

## 2025-07-08 NOTE — PATIENT INSTRUCTIONS
[facility]  Instructions for surgery        Your surgery is scheduled for 8/5/2025 at [facility] on the 1st floor of the [facility] building.Your arrival time is 6  a.m    Your preop lab work will be done  one week prior to surgery. Lab is on the 1st floor of the clinic. EKG is on 2nd floor of the clinic.     You will be notified the day before  surgery verifying your arrival time for surgery.                                                                                                                                                                                                                                                                                                                                                                              Day of Surgery Instructions      Bring a list of all your medications with you the day of your surgery. You can also give the list to your doctor or nurse during your final clinic appointment before surgery.      Do not eat any solid foods or drink any liquids after 12:00 AM (midnight). This includes gum, hard candy, mints, and chewing tobacco.  Medications: Take any medications specified with a small sip of water the morning of your surgery.  Brush your teeth: You may brush your teeth and rinse your mouth. Do not swallow any water or toothpaste.  Clothing: A button front shirt and loose-fitting clothes are the most comfortable before and after surgery. We also recommend low-heeled shoes.  Hair: Avoid buns, ponytails, or hairpieces at the back of the head. Remove or avoid any clips, pins or bands that bind hair. Do not use hairspray. Before going to surgery, you will need to remove any wigs or hairpieces.  We will cover your hair during surgery. Your privacy regarding personal appearance will be respected.  Fingernails: Please be sure to remove all nail polish before you arrive for surgery. We understand that tips,  wraps, gels, etc., are expensive; however, we ask these products to be removed from at least one finger on each hand. Your fingertips are used to accurately monitor your oxygen level during surgery by a device called an oximeter.  Glasses and Contact Lenses: Wear glasses when possible. If contact lenses must be worn, bring a lens case and solution. If glasses are worn, bring a case for them.  Hearing Aids: If you rely on a hearing aid, wear it to the hospital on the day of surgery. This will ensure you can hear and understand everything we need to communicate with you.  Valuables: Jewelry, including body piercings, Dentures, money, and credit cards should be left at home. [facility] is not responsible for valuables that are not secured in our surgery center.  Makeup, Perfume, Creams, Lotions and Deodorants: Do not use any of these products on the day of surgery. Remove false eyelashes prior to surgery.  Implanted Medical Devices: If you have an implanted device, such as a pacemaker or AICD, bring the device information card (if you have it) with you.  Medical Equipment: If you have been fitted for a brace to wear after surgery or you have been given crutches, bring those with you to the surgery center.  Ankle Monitor: Ankle monitors MUST be removed prior to surgery.  Shower: Take a shower with Hibiclens® (chlorhexidine) (available over the counter). This reduces the chance of infection. PLEASE USE CHLORHEXIDINE WASH THE NIGHT BEFORE SURGERY AND THE MORNING OF SURGERY.  ONLY 2 VISITORS ARE ALLOWED WITH YOU ON DAY OF SURGERY.        Medication instructions:  You may take blood pressure medication with a small drink of water the morning of surgery.      IF YOU ARE ON ANY OF THESE BLOOD THINNERS, MAKE SURE YOUR PHYSICIAN IS AWARE.  Eliquis/Apixaban            Wafarin/Coumadin,Jantoven  Xarelto/Rivaroxaban      Pletal/Cilostazol  Plavix/Clopidogrel          Pradaxa/Dibigatran      If you are diabetic      Follow the  diabetic medicine instructions you received during your pre-operative visit.  DO NOT take your insulin or diabetic medications the morning of surgery.  When you arrive at the surgical center, be sure to tell the nurse you are diabetic.    The following blood sugar medications have to be stopped prior to surgery:    Hold 24 hours prior to surgery:    Libraglutide - Saxenda, Victoza  Lixisenatide --Adlxyin  Exenatide  --  Byetta  Empaglifozin--Jardiance  Sitaglitin--Januvia    Hold 1 week prior to surgery:    Semaglutide - Ozempic, Wegouy, Rybelsus  Dulaglutide - Trulicity  Tirzepatide - Mounjaro  Exenatide (extended release inj)-- Bydureon BCise      Hold 48 hours prior to surgery:    Metformin, Glucovance, Metaglip, Fortamet, Glucophage, Riomet, Avandamet, Glimepiride            Other Items to bring with you and know    Insurance card  Identification card such as 's license, passport, or other picture ID  Copy of your advance directives  List of medications and allergies, if not already provided  Name and phone number of person to contact if your condition changes significantly. YOU CANNOT DRIVE YOURSELF HOME FROM THE HOSPITAL THE DAY OF SURGERY.  PLEASE UNDERSTAND THAT OUR OFFICE DOES NOT GIVE PATHOLOGY RESULTS OR TEST RESULTS OVER THE PHONE. THIS WILL BE DISCUSSED WITH YOU ON YOUR FOLLOW UP APPOINTMENT.  IF YOU SUBMIT FMLA FORMS, IT WILL TAKE 3-7 DAYS TO COMPLETE THESE          Alcohol and Surgery  We want to help you prepare for and recover from surgery as quickly and safely as possible. Be open and honest with your provider about how many drinks you have per day. Excessive alcohol use is defined as drinking more than three drinks per day. It can affect the outcome of your surgery. Binge drinking (consuming large amounts of alcohol infrequently, such as on weekends) can also affect the outcome of your surgery.  Alcohol withdrawal  If you drink more than three drinks a day, you could have a complication,  called alcohol withdrawal, after surgery.  Alcohol withdrawal is a set of symptoms that people have when they suddenly stop drinking after using alcohol  for a long time. During withdrawal, a person's central nervous system overreacts. This can cause mild symptoms such as shakiness, sweating or hallucinating. It can also cause other more serious side effects. If not treated properly, alcohol withdrawal can cause potentially life-threatening complications after surgery. This can include tremors, seizures, hallucinations, delirium tremors, and even death. Untreated alcohol withdrawal often leads to a longer stay in the hospital, potentially in the Intensive Care Unit.  Chronic heavy drinking also can interfere with several organ systems and biochemical processes in the body.  This interference can cause serious, even life-threatening complications.  Your care team can offer alcohol withdrawal treatment to help:  Decrease the risk of seizures and delirium tremors after surgery  Decrease the risk we will need to restrain you for your own safety or the safety of others  Decrease your risk of falling after surgery  Reduce the use of potent sedative medications  Reduce the time you stay in the hospital after surgery  Reduce the time you might spend on a mechanical ventilator to help you breathe  Lower incidence of organ failure and biochemical complications  Talk to a member of your care team or your primary care physician about your alcohol use if you feel you may be at risk of any of these complications.        Smoking and Surgery  Quitting smoking is extremely important for a successful surgery and recovery. Cigarette smoking compromises your immune system. This increases your risk of an infection after surgery. Quitting the habit before surgery will decrease the surgical risks associated with smoking.

## 2025-07-28 ENCOUNTER — TELEPHONE (OUTPATIENT)
Dept: SURGERY | Facility: CLINIC | Age: 65
End: 2025-07-28
Payer: MEDICARE

## 2025-07-29 ENCOUNTER — CLINICAL SUPPORT (OUTPATIENT)
Dept: CARDIOLOGY | Facility: CLINIC | Age: 65
End: 2025-07-29
Attending: SURGERY
Payer: MEDICARE

## 2025-07-29 DIAGNOSIS — Z01.818 PRE-PROCEDURAL EXAMINATION: ICD-10-CM

## 2025-07-29 PROCEDURE — 93005 ELECTROCARDIOGRAM TRACING: CPT | Mod: PBBFAC | Performed by: INTERNAL MEDICINE

## 2025-07-29 PROCEDURE — 93010 ELECTROCARDIOGRAM REPORT: CPT | Mod: S$PBB,,, | Performed by: INTERNAL MEDICINE

## 2025-07-29 PROCEDURE — 99211 OFF/OP EST MAY X REQ PHY/QHP: CPT | Mod: PBBFAC,25

## 2025-07-29 PROCEDURE — 99999 PR PBB SHADOW E&M-EST. PATIENT-LVL I: CPT | Mod: PBBFAC,,,

## 2025-07-31 LAB
OHS QRS DURATION: 86 MS
OHS QTC CALCULATION: 418 MS

## 2025-08-05 ENCOUNTER — ANESTHESIA EVENT (OUTPATIENT)
Dept: SURGERY | Facility: HOSPITAL | Age: 65
End: 2025-08-05
Payer: MEDICARE

## 2025-08-05 ENCOUNTER — ANESTHESIA (OUTPATIENT)
Dept: SURGERY | Facility: HOSPITAL | Age: 65
End: 2025-08-05
Payer: MEDICARE

## 2025-08-05 ENCOUNTER — HOSPITAL ENCOUNTER (OUTPATIENT)
Facility: HOSPITAL | Age: 65
Discharge: HOME | End: 2025-08-05
Attending: SURGERY | Admitting: SURGERY
Payer: MEDICARE

## 2025-08-05 VITALS
WEIGHT: 293 LBS | SYSTOLIC BLOOD PRESSURE: 113 MMHG | HEIGHT: 68 IN | BODY MASS INDEX: 44.41 KG/M2 | RESPIRATION RATE: 18 BRPM | HEART RATE: 71 BPM | DIASTOLIC BLOOD PRESSURE: 74 MMHG | TEMPERATURE: 98 F | OXYGEN SATURATION: 100 %

## 2025-08-05 DIAGNOSIS — K43.9 VENTRAL HERNIA WITHOUT OBSTRUCTION OR GANGRENE: Primary | ICD-10-CM

## 2025-08-05 PROCEDURE — 27000689 HC BLADE LARYNGOSCOPE ANY SIZE: Performed by: ANESTHESIOLOGY

## 2025-08-05 PROCEDURE — 71000015 HC POSTOP RECOV 1ST HR: Performed by: SURGERY

## 2025-08-05 PROCEDURE — 71000033 HC RECOVERY, INTIAL HOUR: Performed by: SURGERY

## 2025-08-05 PROCEDURE — 49593 RPR AA HRN 1ST 3-10 RDC: CPT | Mod: ,,, | Performed by: SURGERY

## 2025-08-05 PROCEDURE — 36000711: Performed by: SURGERY

## 2025-08-05 PROCEDURE — 25000003 PHARM REV CODE 250: Performed by: NURSE ANESTHETIST, CERTIFIED REGISTERED

## 2025-08-05 PROCEDURE — 27000165 HC TUBE, ETT CUFFED: Performed by: ANESTHESIOLOGY

## 2025-08-05 PROCEDURE — 36000710: Performed by: SURGERY

## 2025-08-05 PROCEDURE — 25000003 PHARM REV CODE 250: Performed by: SURGERY

## 2025-08-05 PROCEDURE — 63600175 PHARM REV CODE 636 W HCPCS: Performed by: NURSE ANESTHETIST, CERTIFIED REGISTERED

## 2025-08-05 PROCEDURE — 37000009 HC ANESTHESIA EA ADD 15 MINS: Performed by: SURGERY

## 2025-08-05 PROCEDURE — 27000655: Performed by: ANESTHESIOLOGY

## 2025-08-05 PROCEDURE — 63600175 PHARM REV CODE 636 W HCPCS: Performed by: SURGERY

## 2025-08-05 PROCEDURE — 37000008 HC ANESTHESIA 1ST 15 MINUTES: Performed by: SURGERY

## 2025-08-05 PROCEDURE — 71000016 HC POSTOP RECOV ADDL HR: Performed by: SURGERY

## 2025-08-05 PROCEDURE — 27000716 HC OXISENSOR PROBE, ANY SIZE: Performed by: ANESTHESIOLOGY

## 2025-08-05 PROCEDURE — 63600175 PHARM REV CODE 636 W HCPCS: Performed by: ANESTHESIOLOGY

## 2025-08-05 PROCEDURE — 27000510 HC BLANKET BAIR HUGGER ANY SIZE: Performed by: ANESTHESIOLOGY

## 2025-08-05 PROCEDURE — 25000003 PHARM REV CODE 250: Performed by: ANESTHESIOLOGY

## 2025-08-05 PROCEDURE — C1781 MESH (IMPLANTABLE): HCPCS | Performed by: SURGERY

## 2025-08-05 DEVICE — MESH SYMBOTEX HERN RND 9CM: Type: IMPLANTABLE DEVICE | Site: ABDOMEN | Status: FUNCTIONAL

## 2025-08-05 RX ORDER — ROCURONIUM BROMIDE 10 MG/ML
INJECTION, SOLUTION INTRAVENOUS
Status: DISCONTINUED | OUTPATIENT
Start: 2025-08-05 | End: 2025-08-05

## 2025-08-05 RX ORDER — FENTANYL CITRATE 50 UG/ML
INJECTION, SOLUTION INTRAMUSCULAR; INTRAVENOUS
Status: DISCONTINUED | OUTPATIENT
Start: 2025-08-05 | End: 2025-08-05

## 2025-08-05 RX ORDER — IPRATROPIUM BROMIDE AND ALBUTEROL SULFATE 2.5; .5 MG/3ML; MG/3ML
3 SOLUTION RESPIRATORY (INHALATION)
Status: DISCONTINUED | OUTPATIENT
Start: 2025-08-05 | End: 2025-08-05 | Stop reason: HOSPADM

## 2025-08-05 RX ORDER — ONDANSETRON 4 MG/1
8 TABLET, ORALLY DISINTEGRATING ORAL EVERY 8 HOURS PRN
Status: DISCONTINUED | OUTPATIENT
Start: 2025-08-05 | End: 2025-08-05 | Stop reason: HOSPADM

## 2025-08-05 RX ORDER — DIMENHYDRINATE 50 MG
50 TABLET ORAL ONCE
Status: COMPLETED | OUTPATIENT
Start: 2025-08-05 | End: 2025-08-05

## 2025-08-05 RX ORDER — OXYCODONE HYDROCHLORIDE 5 MG/1
5 TABLET ORAL
Status: DISCONTINUED | OUTPATIENT
Start: 2025-08-05 | End: 2025-08-05 | Stop reason: HOSPADM

## 2025-08-05 RX ORDER — PROPOFOL 10 MG/ML
VIAL (ML) INTRAVENOUS
Status: DISCONTINUED | OUTPATIENT
Start: 2025-08-05 | End: 2025-08-05

## 2025-08-05 RX ORDER — SODIUM CHLORIDE, SODIUM LACTATE, POTASSIUM CHLORIDE, CALCIUM CHLORIDE 600; 310; 30; 20 MG/100ML; MG/100ML; MG/100ML; MG/100ML
125 INJECTION, SOLUTION INTRAVENOUS CONTINUOUS
Status: DISCONTINUED | OUTPATIENT
Start: 2025-08-05 | End: 2025-08-05 | Stop reason: HOSPADM

## 2025-08-05 RX ORDER — ONDANSETRON HYDROCHLORIDE 2 MG/ML
4 INJECTION, SOLUTION INTRAVENOUS DAILY PRN
Status: DISCONTINUED | OUTPATIENT
Start: 2025-08-05 | End: 2025-08-05 | Stop reason: HOSPADM

## 2025-08-05 RX ORDER — LIDOCAINE HYDROCHLORIDE 20 MG/ML
INJECTION, SOLUTION EPIDURAL; INFILTRATION; INTRACAUDAL; PERINEURAL
Status: DISCONTINUED | OUTPATIENT
Start: 2025-08-05 | End: 2025-08-05

## 2025-08-05 RX ORDER — CEFAZOLIN 2 G/1
2 INJECTION, POWDER, FOR SOLUTION INTRAMUSCULAR; INTRAVENOUS
Status: COMPLETED | OUTPATIENT
Start: 2025-08-05 | End: 2025-08-05

## 2025-08-05 RX ORDER — SODIUM CHLORIDE 9 MG/ML
INJECTION, SOLUTION INTRAVENOUS CONTINUOUS
Status: DISCONTINUED | OUTPATIENT
Start: 2025-08-05 | End: 2025-08-05 | Stop reason: HOSPADM

## 2025-08-05 RX ORDER — HYDROMORPHONE HYDROCHLORIDE 2 MG/ML
0.5 INJECTION, SOLUTION INTRAMUSCULAR; INTRAVENOUS; SUBCUTANEOUS EVERY 5 MIN PRN
Status: DISCONTINUED | OUTPATIENT
Start: 2025-08-05 | End: 2025-08-05 | Stop reason: HOSPADM

## 2025-08-05 RX ORDER — HYDROCODONE BITARTRATE AND ACETAMINOPHEN 7.5; 325 MG/1; MG/1
1 TABLET ORAL EVERY 6 HOURS PRN
Qty: 15 TABLET | Refills: 0 | Status: SHIPPED | OUTPATIENT
Start: 2025-08-05

## 2025-08-05 RX ORDER — MIDAZOLAM HYDROCHLORIDE 1 MG/ML
INJECTION INTRAMUSCULAR; INTRAVENOUS
Status: DISCONTINUED | OUTPATIENT
Start: 2025-08-05 | End: 2025-08-05

## 2025-08-05 RX ORDER — GLYCOPYRROLATE 0.2 MG/ML
INJECTION INTRAMUSCULAR; INTRAVENOUS
Status: DISCONTINUED | OUTPATIENT
Start: 2025-08-05 | End: 2025-08-05

## 2025-08-05 RX ORDER — DEXAMETHASONE SODIUM PHOSPHATE 4 MG/ML
INJECTION, SOLUTION INTRA-ARTICULAR; INTRALESIONAL; INTRAMUSCULAR; INTRAVENOUS; SOFT TISSUE
Status: DISCONTINUED | OUTPATIENT
Start: 2025-08-05 | End: 2025-08-05

## 2025-08-05 RX ORDER — DIPHENHYDRAMINE HYDROCHLORIDE 50 MG/ML
25 INJECTION, SOLUTION INTRAMUSCULAR; INTRAVENOUS EVERY 6 HOURS PRN
Status: DISCONTINUED | OUTPATIENT
Start: 2025-08-05 | End: 2025-08-05 | Stop reason: HOSPADM

## 2025-08-05 RX ORDER — BUPIVACAINE HYDROCHLORIDE 2.5 MG/ML
INJECTION, SOLUTION EPIDURAL; INFILTRATION; INTRACAUDAL; PERINEURAL
Status: DISCONTINUED | OUTPATIENT
Start: 2025-08-05 | End: 2025-08-05 | Stop reason: HOSPADM

## 2025-08-05 RX ORDER — MORPHINE SULFATE 10 MG/ML
4 INJECTION INTRAMUSCULAR; INTRAVENOUS; SUBCUTANEOUS EVERY 5 MIN PRN
Status: DISCONTINUED | OUTPATIENT
Start: 2025-08-05 | End: 2025-08-05 | Stop reason: HOSPADM

## 2025-08-05 RX ORDER — ONDANSETRON HYDROCHLORIDE 2 MG/ML
INJECTION, SOLUTION INTRAVENOUS
Status: DISCONTINUED | OUTPATIENT
Start: 2025-08-05 | End: 2025-08-05

## 2025-08-05 RX ADMIN — DIMENHYDRINATE 50 MG: 50 TABLET ORAL at 06:08

## 2025-08-05 RX ADMIN — GLYCOPYRROLATE 0.2 MG: 0.2 INJECTION INTRAMUSCULAR; INTRAVENOUS at 08:08

## 2025-08-05 RX ADMIN — SUGAMMADEX 400 MG: 100 INJECTION, SOLUTION INTRAVENOUS at 09:08

## 2025-08-05 RX ADMIN — OXYCODONE HYDROCHLORIDE 5 MG: 5 TABLET ORAL at 10:08

## 2025-08-05 RX ADMIN — MIDAZOLAM HYDROCHLORIDE 2 MG: 1 INJECTION, SOLUTION INTRAMUSCULAR; INTRAVENOUS at 07:08

## 2025-08-05 RX ADMIN — SODIUM CHLORIDE, POTASSIUM CHLORIDE, SODIUM LACTATE AND CALCIUM CHLORIDE 125 ML/HR: 600; 310; 30; 20 INJECTION, SOLUTION INTRAVENOUS at 09:08

## 2025-08-05 RX ADMIN — FENTANYL CITRATE 50 MCG: 50 INJECTION, SOLUTION INTRAMUSCULAR; INTRAVENOUS at 08:08

## 2025-08-05 RX ADMIN — PROPOFOL 150 MG: 10 INJECTION, EMULSION INTRAVENOUS at 08:08

## 2025-08-05 RX ADMIN — DEXAMETHASONE SODIUM PHOSPHATE 8 MG: 4 INJECTION, SOLUTION INTRA-ARTICULAR; INTRALESIONAL; INTRAMUSCULAR; INTRAVENOUS; SOFT TISSUE at 08:08

## 2025-08-05 RX ADMIN — CEFAZOLIN 3 G: 330 INJECTION, POWDER, FOR SOLUTION INTRAMUSCULAR; INTRAVENOUS at 08:08

## 2025-08-05 RX ADMIN — FENTANYL CITRATE 50 MCG: 50 INJECTION, SOLUTION INTRAMUSCULAR; INTRAVENOUS at 09:08

## 2025-08-05 RX ADMIN — ROCURONIUM BROMIDE 50 MG: 10 INJECTION, SOLUTION INTRAVENOUS at 08:08

## 2025-08-05 RX ADMIN — SODIUM CHLORIDE: 9 INJECTION, SOLUTION INTRAVENOUS at 06:08

## 2025-08-05 RX ADMIN — LIDOCAINE HYDROCHLORIDE 50 MG: 20 INJECTION, SOLUTION EPIDURAL; INFILTRATION; INTRACAUDAL; PERINEURAL at 08:08

## 2025-08-05 RX ADMIN — ONDANSETRON 4 MG: 2 INJECTION INTRAMUSCULAR; INTRAVENOUS at 08:08

## 2025-08-05 NOTE — ANESTHESIA PREPROCEDURE EVALUATION
08/05/2025  Heidy Moyer is a 64 y.o., female.      Pre-op Assessment    I have reviewed the Patient Summary Reports.     I have reviewed the Nursing Notes. I have reviewed the NPO Status.   I have reviewed the Medications.     Review of Systems  Anesthesia Hx:  No problems with previous Anesthesia                Social:  Non-Smoker, No Alcohol Use       Hematology/Oncology:  Hematology Normal   Oncology Normal                                   EENT/Dental:  EENT/Dental Normal           Cardiovascular:     Hypertension    Dysrhythmias atrial fibrillation      hyperlipidemia                               Pulmonary:        Sleep Apnea                Renal/:  Chronic Renal Disease, CKD                Hepatic/GI:  Hepatic/GI Normal                    Musculoskeletal:  Musculoskeletal Normal                Neurological:  Neurology Normal                                      Endocrine:  Endocrine Normal          Obesity / BMI > 30  Dermatological:  Skin Normal    Psych:  Psychiatric Normal                    Physical Exam  General: Well nourished    Airway:  Mallampati: III / III  Mouth Opening: Normal  TM Distance: > 6 cm  Tongue: Normal  Neck ROM: Normal ROM    Chest/Lungs:  Clear to auscultation, Normal Respiratory Rate    Heart:  Rate: Normal  Rhythm: Regular Rhythm        Anesthesia Plan  Type of Anesthesia, risks & benefits discussed:    Anesthesia Type: Gen ETT  Intra-op Monitoring Plan: Standard ASA Monitors  Post Op Pain Control Plan: multimodal analgesia  Induction:  IV  Informed Consent: Informed consent signed with the Patient and all parties understand the risks and agree with anesthesia plan.  All questions answered. Patient consented to blood products? Yes  ASA Score: 3  Day of Surgery Review of History & Physical: H&P Update referred to the surgeon/provider.I have interviewed and  examined the patient. I have reviewed the patient's H&P dated:     Ready For Surgery From Anesthesia Perspective.     .

## 2025-08-05 NOTE — ANESTHESIA POSTPROCEDURE EVALUATION
Anesthesia Post Evaluation    Patient: Heidy Moyer    Procedure(s) Performed: Procedure(s) (LRB):  XI ROBOTIC REPAIR, VENTRAL HERNIA (N/A)    Final Anesthesia Type: general      Patient location during evaluation: PACU  Patient participation: Yes- Able to Participate  Level of consciousness: awake and sedated  Post-procedure vital signs: reviewed and stable  Pain management: adequate  Airway patency: patent    PONV status at discharge: No PONV  Anesthetic complications: no      Cardiovascular status: blood pressure returned to baseline  Respiratory status: unassisted  Hydration status: euvolemic  Follow-up not needed.              Vitals Value Taken Time   /74 08/05/25 11:31   Temp 36.5 °C (97.7 °F) 08/05/25 09:15   Pulse 71 08/05/25 11:34   Resp 18 08/05/25 11:30   SpO2 86 % 08/05/25 11:34   Vitals shown include unfiled device data.      Event Time   Out of Recovery 09:50:00         Pain/Little Score: Pain Rating Prior to Med Admin: 10 (8/5/2025 10:08 AM)  Little Score: 10 (8/5/2025 11:40 AM)

## 2025-08-05 NOTE — ANESTHESIA PROCEDURE NOTES
Intubation    Date/Time: 8/5/2025 8:12 AM    Performed by: Isabela Griffin CRNA  Authorized by: Shan Diaz MD    Intubation:     Induction:  Intravenous    Intubated:  Postinduction    Mask Ventilation:  Easy mask    Attempts:  1    Attempted By:  CRNA    Method of Intubation:  Direct    Blade:  Idan 3    Laryngeal View Grade: Grade IIA - cords partially seen      Difficult Airway Encountered?: No      Complications:  None    Airway Device:  Oral endotracheal tube    Airway Device Size:  7.0    Style/Cuff Inflation:  Cuffed    Inflation Amount (mL):  8    Tube secured:  21    Secured at:  The lips    Placement Verified By:  Capnometry    Complicating Factors:  None    Findings Post-Intubation:  BS equal bilateral and atraumatic/condition of teeth unchanged

## 2025-08-05 NOTE — TRANSFER OF CARE
"Anesthesia Transfer of Care Note    Patient: Heidy Moyer    Procedure(s) Performed: Procedure(s) (LRB):  XI ROBOTIC REPAIR, VENTRAL HERNIA (N/A)    Patient location: PACU    Anesthesia Type: general    Transport from OR: Transported from OR on 6-10 L/min O2 by face mask with adequate spontaneous ventilation    Post pain: adequate analgesia    Post assessment: no apparent anesthetic complications    Post vital signs: stable    Level of consciousness: responds to stimulation    Nausea/Vomiting: no nausea/vomiting    Complications: none    Transfer of care protocol was followed      Last vitals: Visit Vitals  BP (!) 98/54   Pulse 77   Temp 36.5 °C (97.7 °F) (Oral)   Resp 15   Ht 5' 8" (1.727 m)   Wt 132.9 kg (293 lb)   SpO2 (!) 92%   Breastfeeding No   BMI 44.55 kg/m²     "

## 2025-08-26 ENCOUNTER — OFFICE VISIT (OUTPATIENT)
Dept: SURGERY | Facility: CLINIC | Age: 65
End: 2025-08-26
Attending: SURGERY
Payer: MEDICARE

## 2025-08-26 VITALS — WEIGHT: 251 LBS | BODY MASS INDEX: 38.04 KG/M2 | HEIGHT: 68 IN

## 2025-08-26 DIAGNOSIS — K43.9 VENTRAL HERNIA WITHOUT OBSTRUCTION OR GANGRENE: Primary | ICD-10-CM

## 2025-08-26 PROCEDURE — 99213 OFFICE O/P EST LOW 20 MIN: CPT | Mod: PBBFAC | Performed by: SURGERY

## 2025-08-26 PROCEDURE — 4010F ACE/ARB THERAPY RXD/TAKEN: CPT | Mod: CPTII,,, | Performed by: SURGERY

## 2025-08-26 PROCEDURE — 3288F FALL RISK ASSESSMENT DOCD: CPT | Mod: CPTII,,, | Performed by: SURGERY

## 2025-08-26 PROCEDURE — 3008F BODY MASS INDEX DOCD: CPT | Mod: CPTII,,, | Performed by: SURGERY

## 2025-08-26 PROCEDURE — 99213 OFFICE O/P EST LOW 20 MIN: CPT | Mod: S$PBB,,, | Performed by: SURGERY

## 2025-08-26 PROCEDURE — 1159F MED LIST DOCD IN RCRD: CPT | Mod: CPTII,,, | Performed by: SURGERY

## 2025-08-26 PROCEDURE — 1101F PT FALLS ASSESS-DOCD LE1/YR: CPT | Mod: CPTII,,, | Performed by: SURGERY

## 2025-08-26 PROCEDURE — 99999 PR PBB SHADOW E&M-EST. PATIENT-LVL III: CPT | Mod: PBBFAC,,, | Performed by: SURGERY

## (undated) DEVICE — STAPLER SKIN WIDE

## (undated) DEVICE — APPLICATOR CHLORAPREP ORN 26ML

## (undated) DEVICE — PROGRASP DA VINCI

## (undated) DEVICE — BLADE PERFORMANCE SAG 21X90MM

## (undated) DEVICE — COVER TIP CURVED SCISSORS XI

## (undated) DEVICE — DRESSING MEPILEX SACR 22X25CM

## (undated) DEVICE — GLOVE SENSICARE PI SURG 7.5

## (undated) DEVICE — SYS REVOLUTION CEMENT MIXING

## (undated) DEVICE — SEAL CANN UNIVERSAL 5-12MM

## (undated) DEVICE — KIT TOTAL KNEE RUSH

## (undated) DEVICE — GLOVE SENSICARE PI GRN 8

## (undated) DEVICE — GLOVE BIOGEL SKINSENSE PI 6.5

## (undated) DEVICE — GLOVE SENSICARE PI SURG 7

## (undated) DEVICE — GOWN TOGA SYS PEELWY ZIP 2 XL

## (undated) DEVICE — GLOVE SENSICARE PI GRN 6.5

## (undated) DEVICE — OBTURATOR BLADELESS 8MM XI CLR

## (undated) DEVICE — DRAPE INCISE IOBAN 2 23X23IN

## (undated) DEVICE — SOL IRRI STRL WATER 1000ML

## (undated) DEVICE — GLOVE BIOGEL SKINSENSE PI 7.5

## (undated) DEVICE — GLOVE SENSICARE PI SURG 8.5

## (undated) DEVICE — SOL NACL IRR 3000ML

## (undated) DEVICE — ADHESIVE MASTISOL VIAL 48/BX

## (undated) DEVICE — GLOVE SENSICARE PI SURG 6.5

## (undated) DEVICE — PAD CAST SPECIALIST STRL 3

## (undated) DEVICE — GLOVE SENSICARE PI GRN 7.5

## (undated) DEVICE — TOURNIQUET SB QC SP 34X4IN

## (undated) DEVICE — GLOVE 7.0 PROTEXIS PI BLUE

## (undated) DEVICE — GLOVE 8.5 PROTEXIS PI BLUE

## (undated) DEVICE — KIT IRR SUCTION HND PIECE

## (undated) DEVICE — OVERLAY MATTRESS WAFFLE

## (undated) DEVICE — SOLIDIFIER BTL W/TREAT 1500CC

## (undated) DEVICE — CARD UNIV KNEE NAVGTN SW-SCL L

## (undated) DEVICE — GUIDEPIN 3.20MM 4 KANT SQUARE
Type: IMPLANTABLE DEVICE | Site: KNEE | Status: NON-FUNCTIONAL
Removed: 2023-08-31

## (undated) DEVICE — GLOVE SENSICARE PI SURG 8

## (undated) DEVICE — SOL NACL IRR 1000ML BTL

## (undated) DEVICE — Device

## (undated) DEVICE — GLOVE BIOGEL SKINSENSE PI 8.5

## (undated) DEVICE — TROCAR ENDO Z THREAD KII 5X100

## (undated) DEVICE — BINDER ABDOMINAL TRI-PANEL 3XL

## (undated) DEVICE — BNDG COFLEX FOAM LF2 ST 4X5YD

## (undated) DEVICE — TAPE DRAPE STRIP CLSR 4.5X24IN

## (undated) DEVICE — WARMER BLUE HEAT SCOPE 3-12MM

## (undated) DEVICE — GUIDEPIN 3.20MM 4 KANT SQUARE
Type: IMPLANTABLE DEVICE | Site: KNEE | Status: NON-FUNCTIONAL
Removed: 2025-01-30

## (undated) DEVICE — GLOVE 6.5 PROTEXIS PI BLUE

## (undated) DEVICE — BATTERY INSTRUMENT

## (undated) DEVICE — SUT V-LOC 180 GRN GS-21 12IN

## (undated) DEVICE — DRESSING TRANS 4X4 TEGADERM

## (undated) DEVICE — SOL NORMAL USPCA 0.9%

## (undated) DEVICE — GLOVE 7.5 PROTEXIS PI BLUE

## (undated) DEVICE — COVER PROXIMA MAYO STAND

## (undated) DEVICE — SUT 2-0 VICRYL / CT-1

## (undated) DEVICE — SCISSOR HOT SHEARS XI

## (undated) DEVICE — REAMER PATELLA 42MM DISP

## (undated) DEVICE — SUT 2/0 36IN ETHIBOND EXCE

## (undated) DEVICE — SPONGE COTTON TRAY 4X4IN

## (undated) DEVICE — GLOVE SENSICARE PI GRN 8.5

## (undated) DEVICE — SUT VICRYL CTD 1 27IN CP

## (undated) DEVICE — DRESSING MEPILEX HEEL 22X23CM

## (undated) DEVICE — STRIP MEDI WND CLSR 1/2X4IN

## (undated) DEVICE — CANISTER SUCTION MEDI-VAC 12L

## (undated) DEVICE — DRESSING AQUACEL FOAM RECT 6X6

## (undated) DEVICE — PENCIL SMK EVAC CONNECTOR 10FT

## (undated) DEVICE — SUT STRATAFIX 1 SPIRAL .5

## (undated) DEVICE — SUT MONOCYRL 4-0 PS2 UND

## (undated) DEVICE — GLOVE BIOGEL SKINSENSE PI 7.0

## (undated) DEVICE — GLOVE SENSICARE PI GRN 7

## (undated) DEVICE — KIT EVACUATOR 3 SPR  DRN 400CC

## (undated) DEVICE — TRAY CATH FOL SIL URIMTR 16FR

## (undated) DEVICE — DRAPE ARM DAVINCI XI